# Patient Record
Sex: FEMALE | Race: WHITE
[De-identification: names, ages, dates, MRNs, and addresses within clinical notes are randomized per-mention and may not be internally consistent; named-entity substitution may affect disease eponyms.]

---

## 2019-06-14 ENCOUNTER — HOSPITAL ENCOUNTER (INPATIENT)
Dept: HOSPITAL 52 - LL.ED | Age: 74
LOS: 4 days | Discharge: NURSING, REHAB OR LONG-TERM CARE | DRG: 638 | End: 2019-06-18
Attending: FAMILY MEDICINE | Admitting: FAMILY MEDICINE
Payer: MEDICARE

## 2019-06-14 DIAGNOSIS — E87.5: ICD-10-CM

## 2019-06-14 DIAGNOSIS — G89.29: ICD-10-CM

## 2019-06-14 DIAGNOSIS — Z90.79: ICD-10-CM

## 2019-06-14 DIAGNOSIS — N18.3: ICD-10-CM

## 2019-06-14 DIAGNOSIS — K59.09: ICD-10-CM

## 2019-06-14 DIAGNOSIS — Z87.891: ICD-10-CM

## 2019-06-14 DIAGNOSIS — Z91.048: ICD-10-CM

## 2019-06-14 DIAGNOSIS — E11.65: Primary | ICD-10-CM

## 2019-06-14 DIAGNOSIS — H40.9: ICD-10-CM

## 2019-06-14 DIAGNOSIS — M54.9: ICD-10-CM

## 2019-06-14 DIAGNOSIS — Z79.82: ICD-10-CM

## 2019-06-14 DIAGNOSIS — I25.10: ICD-10-CM

## 2019-06-14 DIAGNOSIS — E66.9: ICD-10-CM

## 2019-06-14 DIAGNOSIS — E78.2: ICD-10-CM

## 2019-06-14 DIAGNOSIS — Z91.19: ICD-10-CM

## 2019-06-14 DIAGNOSIS — F41.8: ICD-10-CM

## 2019-06-14 DIAGNOSIS — K59.00: ICD-10-CM

## 2019-06-14 DIAGNOSIS — E87.1: ICD-10-CM

## 2019-06-14 DIAGNOSIS — D50.9: ICD-10-CM

## 2019-06-14 DIAGNOSIS — K21.9: ICD-10-CM

## 2019-06-14 DIAGNOSIS — J44.9: ICD-10-CM

## 2019-06-14 DIAGNOSIS — I50.9: ICD-10-CM

## 2019-06-14 DIAGNOSIS — E11.42: ICD-10-CM

## 2019-06-14 DIAGNOSIS — I13.0: ICD-10-CM

## 2019-06-14 DIAGNOSIS — Z88.8: ICD-10-CM

## 2019-06-14 DIAGNOSIS — Z79.899: ICD-10-CM

## 2019-06-14 DIAGNOSIS — E11.40: ICD-10-CM

## 2019-06-14 DIAGNOSIS — G31.01: ICD-10-CM

## 2019-06-14 DIAGNOSIS — E78.00: ICD-10-CM

## 2019-06-14 DIAGNOSIS — F02.80: ICD-10-CM

## 2019-06-14 DIAGNOSIS — E78.5: ICD-10-CM

## 2019-06-14 DIAGNOSIS — Z98.51: ICD-10-CM

## 2019-06-14 DIAGNOSIS — R42: ICD-10-CM

## 2019-06-14 DIAGNOSIS — E83.42: ICD-10-CM

## 2019-06-14 DIAGNOSIS — G47.30: ICD-10-CM

## 2019-06-14 LAB
CHLORIDE SERPL-SCNC: 94 MMOL/L (ref 98–107)
SODIUM SERPL-SCNC: 128 MMOL/L (ref 136–145)

## 2019-06-14 NOTE — EDM.PDOC
ED HPI GENERAL MEDICAL PROBLEM





- General


Chief Complaint: General


Stated Complaint: high blood sugar


Time Seen by Provider: 06/14/19 17:02


Source of Information: Reports: Patient, Family


History Limitations: Reports: Other (Patient is very ignorant in regards to her 

chronic medical problems and medications, minimizes family's concern regarding 

her wellbeing. )





- History of Present Illness


INITIAL COMMENTS - FREE TEXT/NARRATIVE: 





Patient comes to ER with complaint of feeling more lightheaded/unsteady. 

Sensation is worse when she moves her head. 


This overall is her main complaint. She denies any other acute changes. 





She was referred to the ER because of labs values that concerned the [facility] in [place] that were obtained early this past week.  Patient had glucose 

in 400s and potassium over 5.  [facility] could not contact the patient 

regarding these for several days. Ultimately they were able to call patient's 

relatives who brought the patient in to be seen.  She was then referred to the 

ER to be formally evaluated. 





Practitioner at [facility] and family feel that patient is noncompliant with her 

medications.  They also raise concerns that patient would do better if in a 

more supervised living situation where her meds are given to her on a scheduled 

basis.  Until recently, patient lived in [place]. She moved her at the encouragement 

of the family where in turn were concerned about the patient's safety and well-

being. 





Patient is very poor at recalling medications and diagnoses she has had in the 

past. She is appropriately oriented at this time.  





Review of note from [facility] shows that today BS was 511.  Admitted to provider 

that she didn't take insulin as directed, is not checking BS, and did not know 

if she had eaten at all today.  Long history of memory impairment and concerns 

of patient's inability to care for self documented per provider's chart review, 

including not taking meds as directed. 





- Related Data


 Allergies











Allergy/AdvReac Type Severity Reaction Status Date / Time


 


blue dye Allergy  Other Verified 06/14/19 17:15


 


fenofibrate [From Tricor] Allergy  Other Verified 06/14/19 17:15


 


simvastatin Allergy  Other Verified 06/14/19 17:15











Home Meds: 


 Home Meds





Acetaminophen [Acetaminophen Extra Strength] 1,000 mg PO Q4HR 06/14/19 [History]


Acetaminophen with Codeine [Acetaminophen-Cod #3] 1 tab PO BID PRN 06/14/19 [

History]


Aspirin [Ecotrin EC] 81 mg PO DAILY 06/14/19 [History]


Calcium Carbonate/Vitamin D3 [Calcium 600-Vit D3 800 Tab] 1 tab PO DAILY 06/14/ 19 [History]


Carvedilol 25 mg PO BIDAC 06/14/19 [History]


Citalopram [Citalopram HBr] 20 mg PO DAILY 06/14/19 [History]


Enalapril [Vasotec] 20 mg PO BID 06/14/19 [History]


Ferrous Gluconate 324 mg PO DAILY 06/14/19 [History]


Lancets 1 each MC ASDIRECTED 06/14/19 [History]


Meclizine [Antivert] 25 mg PO Q4HR PRN 06/14/19 [History]


Omega-3 Fatty Acids/DHA/EPA [Ovega-3 Softgel] 1 each PO DAILY 06/14/19 [History]


QUEtiapine [SEROquel] 12.5 mg PO DAILY 06/14/19 [History]


Ranitidine [Zantac] 150 mg PO BID PRN 06/14/19 [History]


Sennosides/Docusate Sodium [Senna Plus Tablet] 1 tab PO BID 06/14/19 [History]


Sodium Chloride 1 gm PO DAILY 06/14/19 [History]


Spironolactone [Aldactone] 12.5 mg PO DAILY 06/14/19 [History]


amLODIPine [Norvasc] 10 mg PO DAILY 06/14/19 [History]


cloNIDine HCl [Clonidine HCl ER] 0.1 mg PO BID 06/14/19 [History]


metOLazone [Metolazone] 2.5 mg PO MOTH@08 06/14/19 [History]











Past Medical History


HEENT History: Reports: Glaucoma, Sinusitis, Other (See Below)


Other HEENT History: Dyphasia, dizziness


Cardiovascular History: Reports: CAD, Heart Failure, High Cholesterol, 

Hypertension


Respiratory History: Reports: COPD, Sleep Apnea, Other (See Below)


Other Respiratory History: Lung nodule.


Gastrointestinal History: Reports: Chronic Constipation, GERD


Genitourinary History: Reports: Acute Renal Failure, Chronic Renal Insuffiency (

stage 3), Other (See Below)


Other Genitourinary History: Chronic kidney disease, abdominal wall hernia


OB/GYN History: Reports: Other (See Below)


Other OB/GYN History: Ovarian Cyst


Musculoskeletal History: Reports: Back Pain, Chronic, Other (See Below)


Other Musculoskeletal History: History of vertebral fracture, abdominal wall 

hernia


Neurological History: Reports: Neuropathy, Diabetic, Other (See Below)


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


Psychiatric History: Reports: Addiction, Anxiety, Dementia (waxing/waning 

memory impairment), Depression, Other (See Below)


Other Psychiatric History: Somnolence.


Endocrine/Metabolic History: Reports: Diabetes, Type II, IDDM, Obesity/BMI 30+, 

Other (See Below) (goiter)


Other Endocrine/Metabolic History: Pancreatitis. Goiter


Hematologic History: Reports: Anemia, Iron Deficiency, Other (See Below)


Other Hematologic History: Hyperkalemia, Hyponatremia, iron deficiency, lactic 

acidemia,


Immunologic History: Reports: None





- Past Surgical History


HEENT Surgical History: Reports: Adenoidectomy, Cataract Surgery, Tonsillectomy


Other HEENT Surgeries/Procedures: left ear surgery


GI Surgical History: Reports: Cholecystectomy


Female  Surgical History: Reports: Tubal Ligation





Social & Family History





- Tobacco Use


Smoking Status *Q: Former Smoker


Years of Tobacco use: 1


Used Tobacco, but Quit: Yes


Month/Year Tobacco Last Used: 06/1999





- Caffeine Use


Caffeine Use: Reports: Coffee, Soda


Caffeine Use Comment: Coffee every day- one pot of coffee. Pop occasionally.





- Alcohol Use


Alcohol Use History: No





- Recreational Drug Use


Recreational Drug Use: No





ED ROS GENERAL





- Review of Systems


Review Of Systems: See Below


Constitutional: Denies: Fever, Chills, Malaise, Weakness, Fatigue, Night Sweats

, Diaphoresis, Decreased Appetite, Weight Loss, Weight Gain


HEENT: Reports: Glasses, Vertigo.  Denies: Ear Pain, Eye Pain, Rhinitis, Sinus 

Problem, Throat Pain, Vision Change


Respiratory: Reports: No Symptoms


Cardiovascular: Reports: Lightheadedness.  Denies: Chest Pain, Blood Pressure 

Problem, Claudication, Dyspnea on Exertion, Edema, Orthopnea, Palpitations, 

Syncope


Endocrine: Reports: High Glucose (has high glucose per clinic, patient denies 

this, says her sugars are usually in the 200s)


GI/Abdominal: Reports: Constipation (chronic).  Denies: Abdominal Pain, Diarrhea

, Difficulty Swallowing, Distension, Nausea, Vomiting


: Reports: No Symptoms


Musculoskeletal: Reports: Neck Pain (chronic), Back Pain (chronic)


Skin: Reports: No Symptoms


Neurological: Reports: Confusion (patient denies this, but has history of 

memory impairment), Dizziness, Numbness (has peripheral neuropathy, chronic), 

Difficulty Walking (feels "tippy").  Denies: Headache, Syncope, Trouble Speaking

, Weakness, Change in Speech


Psychiatric: Reports: No Symptoms





ED EXAM, GENERAL





- Physical Exam


Exam: See Below


Exam Limited By: No Limitations


General Appearance: Alert, No Apparent Distress, Obese


Eye Exam: Bilateral Eye: EOMI, PERRL, Other (no nystagmus noted)


Ears: Normal External Exam, Normal Canal, Normal TMs, Other (has hearing aid)


Nose: No: Nasal Deformity, Nasal Swelling, Nasal Drainage


Throat/Mouth: Normal Lips, Normal Voice, No Airway Compromise


Head: Atraumatic, Normocephalic


Neck: Supple, Other (mild tenderness with palpation posterior neck muscles on 

right)


Respiratory/Chest: No Respiratory Distress, Lungs Clear, Normal Breath Sounds, 

No Accessory Muscle Use, Chest Non-Tender


Cardiovascular: Regular Rate, Rhythm, No Murmur


GI/Abdominal: Normal Bowel Sounds, Soft, Non-Tender, Other (large rounded 

abdomen)


 (Female) Exam: Deferred


Rectal (Female) Exam: Deferred


Back Exam: No: Muscle Spasm, Paraspinal Tenderness, Vertebral Tenderness


Extremities: Non-Tender, Normal Capillary Refill


Neurological: Alert, Other (equal strength bilaterally)


Psychiatric: Normal Affect, Normal Mood


Skin Exam: Warm, Dry, Intact, Normal Color





EKG INTERPRETATION


EKG Date: 06/14/19


Time: 18:12


Rhythm: NSR


Rate (Beats/Min): 64


Axis: Normal


P-Wave: Present


QRS: Normal


ST-T: Normal


QT: Normal





Course





- Vital Signs


Last Recorded V/S: 


 Last Vital Signs











Temp  36.9 C   06/14/19 19:03


 


Pulse  65   06/14/19 19:03


 


Resp  16   06/14/19 19:03


 


BP  170/74 H  06/14/19 21:17


 


Pulse Ox  95   06/14/19 19:03














- Orders/Labs/Meds


Orders: 


 Active Orders 24 hr











 Category Date Time Status


 


 EKG Documentation Completion [RC] ASDIRECTED Care  06/14/19 17:29 Active


 


 Abdomen 1V Upright [CR] Stat Exams  06/14/19 18:19 Taken


 


 Sodium Chloride 0.9% [Saline Flush] Med  06/14/19 17:29 Active





 10 ml FLUSH ASDIRECTED PRN   


 


 Saline Lock Insert [OM.PC] Routine Oth  06/14/19 17:29 Ordered








 Medication Orders





Acetaminophen (Tylenol Extra Strength)  1,000 mg PO Q6H PRN


   PRN Reason: Pain/Fever


Amlodipine Besylate (Norvasc)  10 mg PO DAILY [facility]


Aspirin (Halfprin)  81 mg PO DAILY [facility]


Calcium Carbonate (Oystcal-D 625 Mg-125 Units)  1 tab PO DAILY [facility]


Carvedilol (Coreg)  25 mg PO BIDAC [facility]


Citalopram Hydrobromide (Celexa)  20 mg PO DAILY [facility]


Clonidine HCl (Catapres)  0.1 mg PO BID [facility]


   Last Admin: 06/14/19 21:17  Dose: 0.1 mg


Enalapril Maleate (Vasotec)  20 mg PO BID [facility]


   Last Admin: 06/14/19 21:17  Dose: 20 mg


Famotidine (Pepcid)  20 mg PO BID PRN


   PRN Reason: INDIGETION


Ferrous Sulfate (Ferrous Sulfate)  325 mg PO DAILY [facility]


Fish Oil (Fish Oil)  1 gm PO DAILY [facility]


Sodium Chloride (Sodium Chloride 3%)  500 mls @ 20 mls/hr IV ASDIRECTED [facility]


Sodium Chloride (Normal Saline)  1,000 mls @ 100 mls/hr IV ASDIRECTED [facility]


Insulin Human Regular (Humulin R)  0 unit SUBCUT BIDAC [facility]; Protocol


Magnesium Sulfate/Dextrose (Magnesium Sulfate In D5w 100 Premix)  1 gm IV 

ONETIME ONE


   Stop: 06/14/19 23:01


Meclizine HCl (Antivert)  25 mg PO Q4HR PRN


   PRN Reason: Dizziness


Metolazone (Zaroxolyn)  2.5 mg PO MOTH@08 [facility]


Quetiapine Fumarate (Seroquel)  12.5 mg PO DAILY [facility]


Senna/Docusate Sodium (Senna Plus)  1 tab PO BID [facility]


Sodium Chloride (Saline Flush)  10 ml FLUSH ASDIRECTED PRN


   PRN Reason: Keep Vein Open


Sodium Chloride (Sodium Chloride)  1 gm PO DAILY [facility]


Spironolactone (Aldactone)  12.5 mg PO DAILY [facility]








Labs: 


 Laboratory Tests











  06/14/19 06/14/19 06/14/19 Range/Units





  17:36 17:36 17:52 


 


WBC  12.4 H    (4.0-10.2)  K/uL


 


RBC  4.09    (3.77-5.09)  M/uL


 


Hgb  11.5 L    (11.7-15.5)  g/dL


 


Hct  33.7 L    (34.0-46.0)  %


 


MCV  82.4 L    (84.0-98.0)  fL


 


MCH  28.1 L    (28.2-33.3)  pg


 


MCHC  34.1    (31.7-36.0)  g/dL


 


RDW  13.3    (11.2-14.1)  %


 


Plt Count  208    (150-350)  K/uL


 


Neut % (Auto)  55.2    (45.0-80.0)  %


 


Lymph % (Auto)  36.9    (10.0-50.0)  %


 


Mono % (Auto)  5.1    (2.0-14.0)  %


 


Eos % (Auto)  2.6    (0.0-5.0)  %


 


Baso % (Auto)  0.2    (0.0-2.0)  %


 


Neut # (Auto)  6.83    (1.40-7.00)  K/uL


 


Lymph # (Auto)  4.57 H    (0.50-3.50)  K/uL


 


Mono # (Auto)  0.63    (0.00-1.00)  K/uL


 


Eos # (Auto)  0.32    (0.00-0.50)  K/uL


 


Baso # (Auto)  0.03    (0.00-0.20)  K/uL


 


Sodium   128 L   (136-145)  mmol/L


 


Potassium   4.6   (3.5-5.1)  mmol/L


 


Chloride   94 L   ()  mmol/L


 


Carbon Dioxide   22.5   (21.0-32.0)  mmol/L


 


BUN   22 H   (7-18)  mg/dL


 


Creatinine   0.89   (0.51-1.17)  mg/dL


 


Est Cr Clr Drug Dosing   45.18   mL/min


 


Estimated GFR (MDRD)   > 60   mL/min


 


Glucose   372 H   ()  mg/dL


 


Calcium   8.6   (8.5-10.1)  mg/dL


 


Magnesium   1.0 L   (1.8-2.4)  mg/dL


 


Total Bilirubin   0.3   (0.2-1.0)  mg/dL


 


AST   14 L   (15-37)  U/L


 


ALT   18   (12-78)  U/L


 


Alkaline Phosphatase   90   ()  IU/L


 


NT-Pro-B Natriuret Pep   520 H   (0-125)  pg/mL


 


Total Protein   7.0   (6.4-8.2)  g/dL


 


Albumin   3.4   (3.4-5.0)  g/dL


 


Specimen Type    Urinblad  


 


Urine Color    Light yellow  


 


Urine Appearance    Clear  


 


Urine pH    5.5  (5.0-9.0)  


 


Ur Specific Gravity    1.010  (1.005-1.030)  


 


Urine Protein    100 H  (NEGATIVE)  mg/dL


 


Urine Glucose (UA)    >=1000 H  (NEGATIVE)  mg/dL


 


Urine Ketones    Negative  (NEGATIVE)  mg/dL


 


Urine Occult Blood    Trace-lysed H  (NEGATIVE)  


 


Urine Nitrite    Negative  (NEGATIVE)  


 


Urine Bilirubin    Negative  (NEGATIVE)  


 


Urine Urobilinogen    0.2  (0.2-1.0)  E.U./dL


 


Ur Leukocyte Esterase    Negative  (NEGATIVE)  


 


Urine RBC    0-5  /HPF


 


Urine WBC    0-5  /HPF


 


Ur Epithelial Cells    Many H  /LPF


 


Urine Bacteria    Moderate H  (NONE TO FEW)  /HPF


 


Urinalysis Comment      











Meds: 


Medications











Generic Name Dose Route Start Last Admin





  Trade Name Freq  PRN Reason Stop Dose Admin


 


Acetaminophen  1,000 mg  06/14/19 21:02  





  Tylenol Extra Strength  PO   





  Q6H PRN   





  Pain/Fever   





     





     





     


 


Amlodipine Besylate  10 mg  06/15/19 08:00  





  Norvasc  PO   





  DAILY [facility]   





     





     





     





     


 


Aspirin  81 mg  06/15/19 08:00  





  Halfprin  PO   





  DAILY [facility]   





     





     





     





     


 


Calcium Carbonate  1 tab  06/15/19 08:00  





  Oystcal-D 625 Mg-125 Units  PO   





  DAILY [facility]   





     





     





     





     


 


Carvedilol  25 mg  06/15/19 07:30  





  Coreg  PO   





  BIDAC [facility]   





     





     





     





     


 


Citalopram Hydrobromide  20 mg  06/15/19 08:00  





  Celexa  PO   





  DAILY [facility]   





     





     





     





     


 


Clonidine HCl  0.1 mg  06/14/19 20:00  06/14/19 21:17





  Catapres  PO   0.1 mg





  BID [facility]   Administration





     





     





     





     


 


Enalapril Maleate  20 mg  06/14/19 20:00  06/14/19 21:17





  Vasotec  PO   20 mg





  BID [facility]   Administration





     





     





     





     


 


Famotidine  20 mg  06/15/19 08:00  





  Pepcid  PO   





  BID PRN   





  INDIGETION   





     





     





     


 


Ferrous Sulfate  325 mg  06/15/19 08:00  





  Ferrous Sulfate  PO   





  DAILY [facility]   





     





     





     





     


 


Fish Oil  1 gm  06/15/19 08:00  





  Fish Oil  PO   





  DAILY [facility]   





     





     





     





     


 


Sodium Chloride  500 mls @ 20 mls/hr  06/14/19 20:00  





  Sodium Chloride 3%  IV   





  ASDIRECTED [facility]   





     





     





     





     


 


Sodium Chloride  1,000 mls @ 100 mls/hr  06/14/19 21:00  





  Normal Saline  IV   





  ASDIRECTED [facility]   





     





     





     





     


 


Insulin Human Regular  0 unit  06/15/19 07:30  





  Humulin R  SUBCUT   





  BIDAC [facility]   





     





     





  Protocol   





     


 


Magnesium Sulfate/Dextrose  1 gm  06/14/19 23:00  





  Magnesium Sulfate In D5w 100 Premix  IV  06/14/19 23:01  





  ONETIME ONE   





     





     





     





     


 


Meclizine HCl  25 mg  06/14/19 19:49  





  Antivert  PO   





  Q4HR PRN   





  Dizziness   





     





     





     


 


Metolazone  2.5 mg  06/17/19 08:00  





  Zaroxolyn  PO   





  MOTH@08 [facility]   





     





     





     





     


 


Quetiapine Fumarate  12.5 mg  06/15/19 08:00  





  Seroquel  PO   





  DAILY [facility]   





     





     





     





     


 


Senna/Docusate Sodium  1 tab  06/15/19 08:00  





  Senna Plus  PO   





  BID [facility]   





     





     





     





     


 


Sodium Chloride  10 ml  06/14/19 17:29  





  Saline Flush  FLUSH   





  ASDIRECTED PRN   





  Keep Vein Open   





     





     





     


 


Sodium Chloride  1 gm  06/15/19 08:00  





  Sodium Chloride  PO   





  DAILY [facility]   





     





     





     





     


 


Spironolactone  12.5 mg  06/15/19 08:00  





  Aldactone  PO   





  DAILY [facility]   





     





     





     





     














Discontinued Medications














Generic Name Dose Route Start Last Admin





  Trade Name Freq  PRN Reason Stop Dose Admin


 


Acetaminophen  1,000 mg  06/14/19 20:00  06/14/19 21:23





  Tylenol Extra Strength  PO   Not Given





  Q4HR [facility]   





     





     





     





     


 


Acetaminophen  1,000 mg  06/14/19 21:00  06/14/19 21:23





  Tylenol Extra Strength  PO   Not Given





  Q6H [facility]   





     





     





     





     


 


Acetaminophen/Codeine Phosphate  1 tab  06/14/19 19:49  





  Tylenol With Codeine No.3 300mg/30mg  PO   





  BID PRN   





  Pain   





     





     





     


 


Insulin Human Regular  10 unit  06/15/19 19:53  





  Humulin R  SUBCUT  06/15/19 19:54  





  ONETIME ONE   





     





     





     





     


 


Insulin Human Regular  10 unit  06/14/19 20:27  





  Humulin R  SUBCUT  06/14/19 20:28  





  ONETIME ONE   





     





     





     





     


 


Magnesium Citrate  300 ml  06/14/19 21:08  





  Citrate Of Magnesia  PO  06/14/19 21:09  





  ONETIME ONE   





     





     





     





     


 


Magnesium Sulfate/Dextrose  1 gm  06/14/19 18:12  06/14/19 18:59





  Magnesium Sulfate In D5w 100 Premix  IV  06/14/19 18:13  1 gm





  ONETIME ONE   Administration





     





     





     





     


 


Magnesium Sulfate/Dextrose  1 gm  06/14/19 22:00  





  Magnesium Sulfate In D5w 100 Premix  IV  06/14/19 22:01  





  ONETIME ONE   





     





     





     





     














- Radiology Interpretation


Free Text/Narrative:: 





Abdominal film did not show an obstructive pattern





- Re-Assessments/Exams


Free Text/Narrative Re-Assessment/Exam: 





WBC elevated. No obvious focal infection identified at this time. Observe for 

change. 


Admit for treatment of hyperglycemia, low Mg, low Na, and dizziness. 








Departure





- Departure


Time of Disposition: 19:00


Disposition: Admitted As Inpatient 65


Clinical Impression: 


 Diabetes mellitus type 2, uncontrolled, Hypomagnesemia, Dementia, Noncompliance








- Discharge Information


*PRESCRIPTION DRUG MONITORING PROGRAM REVIEWED*: Not Applicable


*COPY OF PRESCRIPTION DRUG MONITORING REPORT IN PATIENT ABDIEL: Not Applicable





- Problem List & Annotations


(1) Diabetes mellitus type 2, uncontrolled


SNOMED Code(s): 458073000, 551808565


   Code(s): E11.65 - TYPE 2 DIABETES MELLITUS WITH HYPERGLYCEMIA   Status: 

Chronic   Priority: High   Current Visit: Yes   Annotation/Comment:: Chronic 

noncompliance with diet, accuchecks, and insulin with recent elevations in 400s/

low 500s.   Recent A1c over 12. 


Initiate regular accuchecks and sliding scale insulin.     


Qualifiers: 


   Glycemic state: with hyperglycemia   Qualified Code(s): E11.65 - Type 2 

diabetes mellitus with hyperglycemia   





(2) Hypomagnesemia


SNOMED Code(s): 900980486


   Code(s): E83.42 - HYPOMAGNESEMIA   Status: Acute   Priority: High   Current 

Visit: No   Annotation/Comment:: Significantly low Mg at 1.0  This may be 

contributing to patient's dizziness. 


IV Magnesium ordered with recheck of Mg in AM.  Change to oral therapy as level 

improves.    





(3) Noncompliance


SNOMED Code(s): 4054655


   Code(s): Z91.19 - PATIENT'S NONCOMPLIANCE W OTH MEDICAL TREATMENT AND 

REGIMEN   Status: Chronic   Priority: High   Current Visit: No   Annotation/

Comment:: Chronic issues with noncompliance noted over several years when 

paperwork sent from [facility] reviewed. Suspect this is due to patient's Pick 

Disease and is worsening with progressing memory impairment. Family would like 

patient placed in a facility where meds are monitored and given at set times.  

  





(4) Dizziness


SNOMED Code(s): 873991955, 891700596


   Code(s): R42 - DIZZINESS AND GIDDINESS   Status: Acute   Priority: High   

Current Visit: Yes   Annotation/Comment:: History of intermittent light 

headedness.  Has PRN Meclizine at home but has not been taking it.  Dizziness 

may be exacerbated by severely low Magnesium, and also possibly by patient's 

hyponatremia.    





(5) Hyponatremia


SNOMED Code(s): 76029555


   Code(s): E87.1 - HYPO-OSMOLALITY AND HYPONATREMIA   Status: Chronic   

Priority: Medium   Current Visit: Yes   Annotation/Comment:: Chronic issues 

with low sodium.  Is supposed to be taking daily oral supplement.    





(6) Pick's disease


SNOMED Code(s): 97649647


   Code(s): G31.01 - PICK'S DISEASE; F02.80 - DEMENTIA IN OTH DISEASES CLASSD 

ELSWHR W/O BEHAVRL DISTURB   Status: Chronic   Priority: High   Current Visit: 

Yes   Annotation/Comment:: Patient would likely benefit from updated Neurology 

evaluation. Given the severity of her noncompliance issues and lack of insight 

into the problem/consequences, it would be in her best interest to be placed in 

a living situation where she gets consistent daily assistance with medication 

administration. She has had home health doing weekly med set up in past but 

patient was still non-compliant when left to her own device.    


Qualifiers: 


   Dementia behavioral disturbance: without behavioral disturbance   Qualified 

Code(s): G31.01 - Pick's disease; F02.80 - Dementia in other diseases 

classified elsewhere without behavioral disturbance   





(7) Iron (Fe) deficiency anemia


SNOMED Code(s): 67714195


   Code(s): D50.9 - IRON DEFICIENCY ANEMIA, UNSPECIFIED   Status: Chronic   

Priority: Low   Current Visit: No   Annotation/Comment:: has been stable per 

patient   





(8) CHF (congestive heart failure)


SNOMED Code(s): 49291380


   Code(s): I50.9 - HEART FAILURE, UNSPECIFIED   Status: Chronic   Priority: 

Low   Current Visit: No   Annotation/Comment:: Uncertain as to when last 

evaluation/echo performed.    


Qualifiers: 


   Heart failure type: unspecified   Heart failure chronicity: chronic   

Qualified Code(s): I50.9 - Heart failure, unspecified   





(9) Chronic back pain


SNOMED Code(s): 928546810


   Code(s): M54.9 - DORSALGIA, UNSPECIFIED; G89.29 - OTHER CHRONIC PAIN   Status

: Chronic   Priority: Low   Current Visit: No   Annotation/Comment:: History of 

chronic upper and lower back pain   





(10) Chronic kidney disease


SNOMED Code(s): 111341987


   Code(s): N18.9 - CHRONIC KIDNEY DISEASE, UNSPECIFIED   Status: Chronic   

Priority: Low   Current Visit: No   


Qualifiers: 


   Chronic kidney disease stage: stage 3 (moderate)   Qualified Code(s): N18.3 

- Chronic kidney disease, stage 3 (moderate)   





(11) Hypertension


SNOMED Code(s): 04183294


   Code(s): I10 - ESSENTIAL (PRIMARY) HYPERTENSION   Status: Chronic   Priority

: Medium   Current Visit: Yes   Annotation/Comment:: Elevated readings in ER. 

Unknown if patient compliant with meds.  Observe for change during inpatient 

stay.    


Qualifiers: 


   Hypertension type: essential hypertension   Qualified Code(s): I10 - 

Essential (primary) hypertension   





(12) Hyperlipidemia


SNOMED Code(s): 33855812


   Code(s): E78.5 - HYPERLIPIDEMIA, UNSPECIFIED   Status: Chronic   Priority: 

Low   Current Visit: No   


Qualifiers: 


   Hyperlipidemia type: unspecified   Qualified Code(s): E78.5 - Hyperlipidemia

, unspecified   





(13) COPD (chronic obstructive pulmonary disease)


SNOMED Code(s): 75603443


   Code(s): J44.9 - CHRONIC OBSTRUCTIVE PULMONARY DISEASE, UNSPECIFIED   Status

: Chronic   Priority: Low   Current Visit: No   Annotation/Comment:: stable per 

patient, no neb tx or inhalers prescribed.    





(14) CAD (coronary artery disease)


SNOMED Code(s): 54497468


   Code(s): I25.10 - ATHSCL HEART DISEASE OF NATIVE CORONARY ARTERY W/O ANG 

PCTRS   Status: Chronic   Priority: Low   Current Visit: No   Annotation/Comment

:: Noted on past medical history problem list   


Qualifiers: 


   Coronary Disease-Associated Artery/Lesion type: unspecified vessel or lesion 

type 





(15) Depression with anxiety


SNOMED Code(s): 505778919


   Code(s): F41.8 - OTHER SPECIFIED ANXIETY DISORDERS   Status: Chronic   

Priority: Low   Current Visit: No   Annotation/Comment:: observe   





(16) Sleep apnea


SNOMED Code(s): 81011099


   Code(s): G47.30 - SLEEP APNEA, UNSPECIFIED   Status: Chronic   Priority: Low

   Current Visit: No   


Qualifiers: 


   Sleep apnea type: unspecified type   Qualified Code(s): G47.30 - Sleep apnea

, unspecified   





(17) Constipation


SNOMED Code(s): 79714983


   Code(s): K59.00 - CONSTIPATION, UNSPECIFIED   Status: Chronic   Priority: 

Low   Current Visit: Yes   Annotation/Comment:: patient denies having recent 

bowel movement   





(18) Neuropathy


SNOMED Code(s): 409655830


   Code(s): G62.9 - POLYNEUROPATHY, UNSPECIFIED   Status: Chronic   Priority: 

Low   Current Visit: No   Annotation/Comment:: stable per patient   





- Problem List Review


Problem List Initiated/Reviewed/Updated: Yes





- My Orders


Last 24 Hours: 


My Active Orders





06/14/19 17:29


EKG Documentation Completion [RC] ASDIRECTED 


Sodium Chloride 0.9% [Saline Flush]   10 ml FLUSH ASDIRECTED PRN 


Saline Lock Insert [OM.PC] Routine 





06/14/19 18:19


Abdomen 1V Upright [CR] Stat 














- Assessment/Plan


Admission H&P: Please use this note as an admission H&P


Last 24 Hours: 


My Active Orders





06/14/19 17:29


EKG Documentation Completion [RC] ASDIRECTED 


Sodium Chloride 0.9% [Saline Flush]   10 ml FLUSH ASDIRECTED PRN 


Saline Lock Insert [OM.PC] Routine 





06/14/19 18:19


Abdomen 1V Upright [CR] Stat 











Assessment:: 





as above


Plan: 





Observe patient for changes given that she will be receiving regularly 

scheduled medications as prescribed.  Treat hyperglycemia/stabilize blood sugars

, magnesium, and sodium levels.  Determine if lightheadedness/unsteadiness 

improves with these corrections.  Have PT/OT evaluate patient on Monday.  Have 

Case Management visit with the family and discuss concerns around patient's 

living situation, safety.  Patient may need to be declared incompetent in order 

to get her to live in a more supervised environment. She has no insight into 

the gravity of her situation and health risks associated with her 

noncompliance.  Recommend in depth neuro-cognitive evaluation.  Anticipate 4 

days of inpatient care with possible Swing Bed placement depending on patient's 

course.

## 2019-06-15 LAB
CHLORIDE SERPL-SCNC: 101 MMOL/L (ref 98–107)
HBA1C MFR BLD: 12.5 % (ref 4.3–5.7)
SODIUM SERPL-SCNC: 134 MMOL/L (ref 136–145)

## 2019-06-15 RX ADMIN — OMEGA-3 FATTY ACIDS CAP 1000 MG SCH GM: 1000 CAP at 07:23

## 2019-06-15 RX ADMIN — CALCIUM CARBONATE-CHOLECALCIFEROL TAB 250 MG-125 UNIT SCH TAB: 250-125 TAB at 07:24

## 2019-06-15 RX ADMIN — INSULIN HUMAN SCH UNITS: 100 INJECTION, SOLUTION PARENTERAL at 12:02

## 2019-06-15 RX ADMIN — INSULIN HUMAN SCH UNITS: 100 INJECTION, SOLUTION PARENTERAL at 17:13

## 2019-06-15 NOTE — PCM.PN
- General Info


Date of Service: 06/15/19


Admission Dx/Problem (Free Text): 





Hyperglycemia, low Magnesium, hyponatremia, recent hyperkalemia in patient with 

history of memory impairment. 


Subjective Update: 





Patient feels improved. Still feels wobbly when trying to stand/ambulate. 


Functional Status: Reports: Pain Controlled, Tolerating Diet.  Denies: New 

Symptoms





- Review of Systems


General: Reports: No Symptoms


HEENT: Reports: No Symptoms, Glasses


Pulmonary: Reports: No Symptoms


Cardiovascular: Reports: No Symptoms


Gastrointestinal: Reports: Constipation


Genitourinary: Reports: No Symptoms


Musculoskeletal: Reports: No Symptoms


Skin: Reports: No Symptoms


Neurological: Reports: Pre-Existing Deficit (memory impairment), Difficulty 

Walking (unsteady gait)


Psychiatric: Reports: No Symptoms





- Patient Data


Vitals - Most Recent: 


 Last Vital Signs











Temp  36.9 C   06/15/19 07:22


 


Pulse  64   06/15/19 07:24


 


Resp  18   06/15/19 07:22


 


BP  150/70 H  06/15/19 07:24


 


Pulse Ox  97   06/15/19 07:22











Weight - Most Recent: 82.962 kg


I&O - Last 24 Hours: 


 Intake & Output











 06/14/19 06/15/19 06/15/19





 22:59 06:59 14:59


 


Intake Total  920 360


 


Output Total  300 


 


Balance  620 360











Lab Results Last 24 Hours: 


 Laboratory Results - last 24 hr











  06/14/19 06/14/19 06/14/19 Range/Units





  17:36 17:36 17:52 


 


WBC  12.4 H    (4.0-10.2)  K/uL


 


RBC  4.09    (3.77-5.09)  M/uL


 


Hgb  11.5 L    (11.7-15.5)  g/dL


 


Hct  33.7 L    (34.0-46.0)  %


 


MCV  82.4 L    (84.0-98.0)  fL


 


MCH  28.1 L    (28.2-33.3)  pg


 


MCHC  34.1    (31.7-36.0)  g/dL


 


RDW  13.3    (11.2-14.1)  %


 


Plt Count  208    (150-350)  K/uL


 


Neut % (Auto)  55.2    (45.0-80.0)  %


 


Lymph % (Auto)  36.9    (10.0-50.0)  %


 


Mono % (Auto)  5.1    (2.0-14.0)  %


 


Eos % (Auto)  2.6    (0.0-5.0)  %


 


Baso % (Auto)  0.2    (0.0-2.0)  %


 


Neut # (Auto)  6.83    (1.40-7.00)  K/uL


 


Lymph # (Auto)  4.57 H    (0.50-3.50)  K/uL


 


Mono # (Auto)  0.63    (0.00-1.00)  K/uL


 


Eos # (Auto)  0.32    (0.00-0.50)  K/uL


 


Baso # (Auto)  0.03    (0.00-0.20)  K/uL


 


Sodium   128 L   (136-145)  mmol/L


 


Potassium   4.6   (3.5-5.1)  mmol/L


 


Chloride   94 L   ()  mmol/L


 


Carbon Dioxide   22.5   (21.0-32.0)  mmol/L


 


BUN   22 H   (7-18)  mg/dL


 


Creatinine   0.89   (0.51-1.17)  mg/dL


 


Est Cr Clr Drug Dosing   45.18   mL/min


 


Estimated GFR (MDRD)   > 60   mL/min


 


Glucose   372 H   ()  mg/dL


 


POC Glucose     ()  mg/dl


 


Hemoglobin A1c     (4.3-5.7)  %


 


Calcium   8.6   (8.5-10.1)  mg/dL


 


Magnesium   1.0 L   (1.8-2.4)  mg/dL


 


Total Bilirubin   0.3   (0.2-1.0)  mg/dL


 


AST   14 L   (15-37)  U/L


 


ALT   18   (12-78)  U/L


 


Alkaline Phosphatase   90   ()  IU/L


 


NT-Pro-B Natriuret Pep   520 H   (0-125)  pg/mL


 


Total Protein   7.0   (6.4-8.2)  g/dL


 


Albumin   3.4   (3.4-5.0)  g/dL


 


Specimen Type    Urinblad  


 


Urine Color    Light yellow  


 


Urine Appearance    Clear  


 


Urine pH    5.5  (5.0-9.0)  


 


Ur Specific Gravity    1.010  (1.005-1.030)  


 


Urine Protein    100 H  (NEGATIVE)  mg/dL


 


Urine Glucose (UA)    >=1000 H  (NEGATIVE)  mg/dL


 


Urine Ketones    Negative  (NEGATIVE)  mg/dL


 


Urine Occult Blood    Trace-lysed H  (NEGATIVE)  


 


Urine Nitrite    Negative  (NEGATIVE)  


 


Urine Bilirubin    Negative  (NEGATIVE)  


 


Urine Urobilinogen    0.2  (0.2-1.0)  E.U./dL


 


Ur Leukocyte Esterase    Negative  (NEGATIVE)  


 


Urine RBC    0-5  /HPF


 


Urine WBC    0-5  /HPF


 


Ur Epithelial Cells    Many H  /LPF


 


Urine Bacteria    Moderate H  (NONE TO FEW)  /HPF


 


Urinalysis Comment      














  06/14/19 06/15/19 06/15/19 Range/Units





  21:35 07:16 07:17 


 


WBC     (4.0-10.2)  K/uL


 


RBC     (3.77-5.09)  M/uL


 


Hgb     (11.7-15.5)  g/dL


 


Hct     (34.0-46.0)  %


 


MCV     (84.0-98.0)  fL


 


MCH     (28.2-33.3)  pg


 


MCHC     (31.7-36.0)  g/dL


 


RDW     (11.2-14.1)  %


 


Plt Count     (150-350)  K/uL


 


Neut % (Auto)     (45.0-80.0)  %


 


Lymph % (Auto)     (10.0-50.0)  %


 


Mono % (Auto)     (2.0-14.0)  %


 


Eos % (Auto)     (0.0-5.0)  %


 


Baso % (Auto)     (0.0-2.0)  %


 


Neut # (Auto)     (1.40-7.00)  K/uL


 


Lymph # (Auto)     (0.50-3.50)  K/uL


 


Mono # (Auto)     (0.00-1.00)  K/uL


 


Eos # (Auto)     (0.00-0.50)  K/uL


 


Baso # (Auto)     (0.00-0.20)  K/uL


 


Sodium    134 L  (136-145)  mmol/L


 


Potassium    4.7  (3.5-5.1)  mmol/L


 


Chloride    101  ()  mmol/L


 


Carbon Dioxide    25.4  (21.0-32.0)  mmol/L


 


BUN    24 H  (7-18)  mg/dL


 


Creatinine    0.84  (0.51-1.17)  mg/dL


 


Est Cr Clr Drug Dosing    47.87  mL/min


 


Estimated GFR (MDRD)    > 60  mL/min


 


Glucose    338 H  ()  mg/dL


 


POC Glucose  334 H*  331 H*   ()  mg/dl


 


Hemoglobin A1c     (4.3-5.7)  %


 


Calcium    7.8 L  (8.5-10.1)  mg/dL


 


Magnesium    2.0  (1.8-2.4)  mg/dL


 


Total Bilirubin     (0.2-1.0)  mg/dL


 


AST     (15-37)  U/L


 


ALT     (12-78)  U/L


 


Alkaline Phosphatase     ()  IU/L


 


NT-Pro-B Natriuret Pep     (0-125)  pg/mL


 


Total Protein     (6.4-8.2)  g/dL


 


Albumin     (3.4-5.0)  g/dL


 


Specimen Type     


 


Urine Color     


 


Urine Appearance     


 


Urine pH     (5.0-9.0)  


 


Ur Specific Gravity     (1.005-1.030)  


 


Urine Protein     (NEGATIVE)  mg/dL


 


Urine Glucose (UA)     (NEGATIVE)  mg/dL


 


Urine Ketones     (NEGATIVE)  mg/dL


 


Urine Occult Blood     (NEGATIVE)  


 


Urine Nitrite     (NEGATIVE)  


 


Urine Bilirubin     (NEGATIVE)  


 


Urine Urobilinogen     (0.2-1.0)  E.U./dL


 


Ur Leukocyte Esterase     (NEGATIVE)  


 


Urine RBC     /HPF


 


Urine WBC     /HPF


 


Ur Epithelial Cells     /LPF


 


Urine Bacteria     (NONE TO FEW)  /HPF


 


Urinalysis Comment     














  06/15/19 06/15/19 06/15/19 Range/Units





  07:17 07:17 11:10 


 


WBC  10.7 H    (4.0-10.2)  K/uL


 


RBC  3.78    (3.77-5.09)  M/uL


 


Hgb  10.8 L    (11.7-15.5)  g/dL


 


Hct  31.5 L    (34.0-46.0)  %


 


MCV  83.3 L    (84.0-98.0)  fL


 


MCH  28.6    (28.2-33.3)  pg


 


MCHC  34.3    (31.7-36.0)  g/dL


 


RDW  13.5    (11.2-14.1)  %


 


Plt Count  194    (150-350)  K/uL


 


Neut % (Auto)  56.5    (45.0-80.0)  %


 


Lymph % (Auto)  35.6    (10.0-50.0)  %


 


Mono % (Auto)  5.1    (2.0-14.0)  %


 


Eos % (Auto)  2.5    (0.0-5.0)  %


 


Baso % (Auto)  0.3    (0.0-2.0)  %


 


Neut # (Auto)  6.06    (1.40-7.00)  K/uL


 


Lymph # (Auto)  3.82 H    (0.50-3.50)  K/uL


 


Mono # (Auto)  0.55    (0.00-1.00)  K/uL


 


Eos # (Auto)  0.27    (0.00-0.50)  K/uL


 


Baso # (Auto)  0.03    (0.00-0.20)  K/uL


 


Sodium     (136-145)  mmol/L


 


Potassium     (3.5-5.1)  mmol/L


 


Chloride     ()  mmol/L


 


Carbon Dioxide     (21.0-32.0)  mmol/L


 


BUN     (7-18)  mg/dL


 


Creatinine     (0.51-1.17)  mg/dL


 


Est Cr Clr Drug Dosing     mL/min


 


Estimated GFR (MDRD)     mL/min


 


Glucose     ()  mg/dL


 


POC Glucose    337 H*  ()  mg/dl


 


Hemoglobin A1c   12.5 H   (4.3-5.7)  %


 


Calcium     (8.5-10.1)  mg/dL


 


Magnesium     (1.8-2.4)  mg/dL


 


Total Bilirubin     (0.2-1.0)  mg/dL


 


AST     (15-37)  U/L


 


ALT     (12-78)  U/L


 


Alkaline Phosphatase     ()  IU/L


 


NT-Pro-B Natriuret Pep     (0-125)  pg/mL


 


Total Protein     (6.4-8.2)  g/dL


 


Albumin     (3.4-5.0)  g/dL


 


Specimen Type     


 


Urine Color     


 


Urine Appearance     


 


Urine pH     (5.0-9.0)  


 


Ur Specific Gravity     (1.005-1.030)  


 


Urine Protein     (NEGATIVE)  mg/dL


 


Urine Glucose (UA)     (NEGATIVE)  mg/dL


 


Urine Ketones     (NEGATIVE)  mg/dL


 


Urine Occult Blood     (NEGATIVE)  


 


Urine Nitrite     (NEGATIVE)  


 


Urine Bilirubin     (NEGATIVE)  


 


Urine Urobilinogen     (0.2-1.0)  E.U./dL


 


Ur Leukocyte Esterase     (NEGATIVE)  


 


Urine RBC     /HPF


 


Urine WBC     /HPF


 


Ur Epithelial Cells     /LPF


 


Urine Bacteria     (NONE TO FEW)  /HPF


 


Urinalysis Comment     











Med Orders - Current: 


 Current Medications





Acetaminophen (Tylenol Extra Strength)  1,000 mg PO Q6H PRN


   PRN Reason: Pain/Fever


   Last Admin: 06/15/19 07:28 Dose:  1,000 mg


Amlodipine Besylate (Norvasc)  10 mg PO DAILY [facility]


   Last Admin: 06/15/19 07:23 Dose:  10 mg


Aspirin (Halfprin)  81 mg PO DAILY [facility]


   Last Admin: 06/15/19 07:24 Dose:  81 mg


Calcium Carbonate (Oystcal-D 625 Mg-125 Units)  1 tab PO DAILY [facility]


   Last Admin: 06/15/19 07:24 Dose:  1 tab


Carvedilol (Coreg)  25 mg PO BIDAC [facility]


   Last Admin: 06/15/19 07:24 Dose:  25 mg


Citalopram Hydrobromide (Celexa)  20 mg PO DAILY [facility]


   Last Admin: 06/15/19 07:25 Dose:  20 mg


Clonidine HCl (Catapres)  0.1 mg PO BID [facility]


   Last Admin: 06/15/19 07:24 Dose:  0.1 mg


Enalapril Maleate (Vasotec)  20 mg PO BID [facility]


   Last Admin: 06/15/19 07:24 Dose:  20 mg


Famotidine (Pepcid)  20 mg PO BID PRN


   PRN Reason: INDIGETION


Ferrous Sulfate (Ferrous Sulfate)  325 mg PO DAILY [facility]


   Last Admin: 06/15/19 07:25 Dose:  325 mg


Fish Oil (Fish Oil)  1 gm PO DAILY [facility]


   Last Admin: 06/15/19 07:23 Dose:  1 gm


Sodium Chloride (Sodium Chloride 3%)  500 mls @ 15 mls/hr IV ASDIRECTED [facility]


   Last Admin: 06/14/19 21:46 Dose:  20 mls/hr


Sodium Chloride (Normal Saline)  1,000 mls @ 100 mls/hr IV ASDIRECTED [facility]


   Last Admin: 06/15/19 07:41 Dose:  100 mls/hr


Insulin Glargine (Lantus)  30 unit SUBCUT BEDTIME [facility]


Insulin Human Regular (Humulin R)  0 unit SUBCUT TIDAC [facility]; Protocol


   Last Admin: 06/15/19 12:02 Dose:  12 units


Magnesium Oxide (Magnesium Oxide)  400 mg PO DAILY [facility]


   Last Admin: 06/15/19 12:09 Dose:  400 mg


Meclizine HCl (Antivert)  25 mg PO Q4HR PRN


   PRN Reason: Dizziness


Metolazone (Zaroxolyn)  2.5 mg PO MOTH@08 [facility]


Quetiapine Fumarate (Seroquel)  12.5 mg PO DAILY [facility]


   Last Admin: 06/15/19 07:23 Dose:  12.5 mg


Senna/Docusate Sodium (Senna Plus)  1 tab PO BID [facility]


   Last Admin: 06/15/19 07:25 Dose:  1 tab


Sodium Chloride (Saline Flush)  10 ml FLUSH ASDIRECTED PRN


   PRN Reason: Keep Vein Open


Sodium Chloride (Sodium Chloride)  1 gm PO DAILY [facility]


   Last Admin: 06/15/19 07:23 Dose:  1 gm


Spironolactone (Aldactone)  12.5 mg PO DAILY [facility]


   Last Admin: 06/15/19 07:25 Dose:  12.5 mg





Discontinued Medications





Acetaminophen (Tylenol Extra Strength)  1,000 mg PO Q4HR [facility]


   Last Admin: 06/14/19 21:23 Dose:  Not Given


Acetaminophen (Tylenol Extra Strength)  1,000 mg PO Q6H [facility]


   Last Admin: 06/14/19 21:23 Dose:  Not Given


Acetaminophen/Codeine Phosphate (Tylenol With Codeine No.3 300mg/30mg)  1 tab 

PO BID PRN


   PRN Reason: Pain


[facility]  [facility]


   Last Admin: 06/15/19 07:26 Dose:  12 units


Insulin Human Regular (Humulin R)  10 unit SUBCUT ONETIME ONE


   Stop: 06/15/19 19:54


Insulin Human Regular (Humulin R)  10 unit SUBCUT ONETIME ONE


   Stop: 06/14/19 20:28


   Last Admin: 06/14/19 21:37 Dose:  10 unit


Magnesium Citrate (Citrate Of Magnesia)  300 ml PO ONETIME ONE


   Stop: 06/14/19 21:09


   Last Admin: 06/14/19 21:39 Dose:  296 ml


Magnesium Sulfate/Dextrose (Magnesium Sulfate In D5w 100 Premix)  1 gm IV 

ONETIME ONE


   Stop: 06/14/19 18:13


   Last Admin: 06/14/19 18:59 Dose:  1 gm


Magnesium Sulfate/Dextrose (Magnesium Sulfate In D5w 100 Premix)  1 gm IV 

ONETIME ONE


   Stop: 06/14/19 22:01


Magnesium Sulfate/Dextrose (Magnesium Sulfate In D5w 100 Premix)  1 gm IV 

ONETIME ONE


   Stop: 06/14/19 23:01


   Last Admin: 06/14/19 23:16 Dose:  1 gm











- Exam


Quality Assessment: DVT Prophylaxis (TEDs/Ambulation)


General: Alert, Oriented, Cooperative, No Acute Distress


HEENT: Pupils Equal, Pupils Reactive, EOMI, Mucous Membr. Moist/Pink


Neck: Supple


Lungs: Clear to Auscultation, Normal Respiratory Effort


Cardiovascular: Regular Rate, Regular Rhythm


GI/Abdominal Exam: Normal Bowel Sounds, Soft, Non-Tender


 (Female) Exam: Deferred


Back Exam: No: CVA Tenderness (L), CVA Tenderness (R), Muscle Spasm


Extremities: Non-Tender, Normal Capillary Refill


Skin: Warm, Dry


Neurological: No New Focal Deficit


Psy/Mental Status: Alert, Normal Affect, Normal Mood





- Problem List & Annotations


(1) Diabetes mellitus type 2, uncontrolled


SNOMED Code(s): 117523100, 525580682


   Code(s): E11.65 - TYPE 2 DIABETES MELLITUS WITH HYPERGLYCEMIA   Status: 

Chronic   Priority: High   Current Visit: Yes   


Qualifiers: 


   Glycemic state: with hyperglycemia   Qualified Code(s): E11.65 - Type 2 

diabetes mellitus with hyperglycemia   


Annotation/Comment:: Chronic noncompliance with diet, accuchecks, and insulin 

with recent elevations in 400s/low 500s.   Recent A1c over 12. 


Initiate regular accuchecks and sliding scale insulin.     





(2) Hypomagnesemia


SNOMED Code(s): 438255964


   Code(s): E83.42 - HYPOMAGNESEMIA   Status: Acute   Priority: High   Current 

Visit: No   Annotation/Comment:: Significantly low Mg at 1.0 


Improved today after Magnesium supplementation. Oral therapy initiated. Recheck 

level in AM.   





(3) Noncompliance


SNOMED Code(s): 3925516


   Code(s): Z91.19 - PATIENT'S NONCOMPLIANCE W H MEDICAL TREATMENT AND 

REGIMEN   Status: Chronic   Priority: High   Current Visit: No   Annotation/

Comment:: Chronic issues with noncompliance noted over several years when 

paperwork sent from [place] reviewed. Suspect part of this could be due to 

patient's Pick Disease and is worsening with progressing memory impairment. 

Patient says that she felt as though she was on too much medication and that 

the meds were making her feel poorly and this is why she would skip taking it. 

Also mentions being unable to qualify for assistance with costs, citing 

specifically her insulin as being too expensive for her to afford.  Family 

would like patient placed in a facility where meds are monitored and given at 

set times.    





(4) Dizziness


SNOMED Code(s): 694343249, 597267580


   Code(s): R42 - DIZZINESS AND GIDDINESS   Status: Acute   Priority: High   

Current Visit: Yes   Annotation/Comment:: History of intermittent light 

headedness. Also falls.  Has PRN Meclizine at home but has not been taking it.  

Dizziness may be exacerbated by severely low Magnesium, and also possibly by 

patient's hyponatremia.    





(5) Hyponatremia


SNOMED Code(s): 19315503


   Code(s): E87.1 - HYPO-OSMOLALITY AND HYPONATREMIA   Status: Chronic   

Priority: Medium   Current Visit: Yes   Annotation/Comment:: Chronic issues 

with low sodium.  Is supposed to be taking daily oral supplement. Improving but 

still low on today's measurement.    





(6) Pick's disease


SNOMED Code(s): 38512086


   Code(s): G31.01 - PICK'S DISEASE; F02.80 - DEMENTIA IN OTH DISEASES CLASSD 

ELSWHR W/O BEHAVRL DISTURB   Status: Chronic   Priority: High   Current Visit: 

Yes   


Qualifiers: 


   Dementia behavioral disturbance: without behavioral disturbance   Qualified 

Code(s): G31.01 - Pick's disease; F02.80 - Dementia in other diseases 

classified elsewhere without behavioral disturbance   


Annotation/Comment:: Patient would likely benefit from updated Neurology 

evaluation. This may be contributing to patient's noncompliance, but patient 

also states that she is not trusting that she needs to be on all of these 

medications and has issues affording her meds. Given the severity of her 

noncompliance issues and lack of insight into the problem/consequences, it 

might be in her best interest to be placed in a living situation where she gets 

consistent daily assistance with medication administration. She has had home 

health doing weekly med set up in past but patient was still non-compliant when 

left to her own device.    





(7) Iron (Fe) deficiency anemia


SNOMED Code(s): 12473297


   Code(s): D50.9 - IRON DEFICIENCY ANEMIA, UNSPECIFIED   Status: Chronic   

Priority: Low   Current Visit: No   Annotation/Comment:: has been stable per 

patient   





(8) CHF (congestive heart failure)


SNOMED Code(s): 35874790


   Code(s): I50.9 - HEART FAILURE, UNSPECIFIED   Status: Chronic   Priority: 

Low   Current Visit: No   


Qualifiers: 


   Heart failure type: unspecified   Heart failure chronicity: chronic   

Qualified Code(s): I50.9 - Heart failure, unspecified   


Annotation/Comment:: Uncertain as to when last evaluation/echo performed.    





(9) Chronic back pain


SNOMED Code(s): 427271967


   Code(s): M54.9 - DORSALGIA, UNSPECIFIED; G89.29 - OTHER CHRONIC PAIN   Status

: Chronic   Priority: Low   Current Visit: No   Annotation/Comment:: History of 

chronic upper and lower back pain   





(10) Chronic kidney disease


SNOMED Code(s): 405911567


   Code(s): N18.9 - CHRONIC KIDNEY DISEASE, UNSPECIFIED   Status: Chronic   

Priority: Low   Current Visit: No   


Qualifiers: 


   Chronic kidney disease stage: stage 3 (moderate)   Qualified Code(s): N18.3 

- Chronic kidney disease, stage 3 (moderate)   





(11) Hypertension


SNOMED Code(s): 52252056


   Code(s): I10 - ESSENTIAL (PRIMARY) HYPERTENSION   Status: Chronic   Priority

: Medium   Current Visit: Yes   


Qualifiers: 


   Hypertension type: essential hypertension   Qualified Code(s): I10 - 

Essential (primary) hypertension   


Annotation/Comment:: Elevated readings in ER. Unknown if patient compliant with 

meds.  Observe for change during inpatient stay. Overall improving   





(12) Hyperlipidemia


SNOMED Code(s): 66020381


   Code(s): E78.5 - HYPERLIPIDEMIA, UNSPECIFIED   Status: Chronic   Priority: 

Low   Current Visit: No   


Qualifiers: 


   Hyperlipidemia type: unspecified   Qualified Code(s): E78.5 - Hyperlipidemia

, unspecified   





(13) COPD (chronic obstructive pulmonary disease)


SNOMED Code(s): 95641405


   Code(s): J44.9 - CHRONIC OBSTRUCTIVE PULMONARY DISEASE, UNSPECIFIED   Status

: Chronic   Priority: Low   Current Visit: No   Annotation/Comment:: stable per 

patient, no neb tx or inhalers prescribed.    





(14) CAD (coronary artery disease)


SNOMED Code(s): 59365706


   Code(s): I25.10 - ATHSCL HEART DISEASE OF NATIVE CORONARY ARTERY W/O ANG 

PCTRS   Status: Chronic   Priority: Low   Current Visit: No   


Qualifiers: 


   Coronary Disease-Associated Artery/Lesion type: unspecified vessel or lesion 

type 


Annotation/Comment:: Noted on past medical history problem list   





(15) Depression with anxiety


SNOMED Code(s): 849357874


   Code(s): F41.8 - OTHER SPECIFIED ANXIETY DISORDERS   Status: Chronic   

Priority: Low   Current Visit: No   Annotation/Comment:: observe   





(16) Sleep apnea


SNOMED Code(s): 17728288


   Code(s): G47.30 - SLEEP APNEA, UNSPECIFIED   Status: Chronic   Priority: Low

   Current Visit: No   


Qualifiers: 


   Sleep apnea type: unspecified type   Qualified Code(s): G47.30 - Sleep apnea

, unspecified   





(17) Constipation


SNOMED Code(s): 22819095


   Code(s): K59.00 - CONSTIPATION, UNSPECIFIED   Status: Chronic   Priority: 

Low   Current Visit: Yes   Annotation/Comment:: patient denies having recent 

bowel movement   





(18) Neuropathy


SNOMED Code(s): 455995621


   Code(s): G62.9 - POLYNEUROPATHY, UNSPECIFIED   Status: Chronic   Priority: 

Low   Current Visit: No   Annotation/Comment:: stable per patient   





- Problem List Review


Problem List Initiated/Reviewed/Updated: Yes





- My Orders


Last 24 Hours: 


My Active Orders





06/14/19 17:29


EKG Documentation Completion [RC] ASDIRECTED 


Sodium Chloride 0.9% [Saline Flush]   10 ml FLUSH ASDIRECTED PRN 


Saline Lock Insert [OM.PC] Routine 





06/14/19 18:19


Abdomen 1V Upright [CR] Stat 





06/14/19 19:46


Patient Status [ADT] Routine 


Blood Glucose Check, Bedside [RC] QIDACANDBED 


Height and Weight [RC] .PRN 


Oxygen Therapy [RC] .PRN 


Up ad Rossi [RC] ASDIRECTED 


VTE/DVT Education [RC] .PRN 


Vital Signs [RC] Q8HR 


Consult to Case Management/ [CONS] Routine 


OT Evaluation and Treatment [CONS] Routine 


PT Evaluation and Treatment [CONS] Routine 


Resuscitation Status Routine 





06/14/19 19:47


Intake and Output [RC] QSHIFT 


Pulse Oximetry [RC] .PRN 





06/14/19 19:48


Antiembolic Devices [RC] 08,20 


Antiembolic Hose [OM.PC] Per Unit Routine 





06/14/19 19:49


Meclizine [Antivert]   25 mg PO Q4HR PRN 





06/14/19 20:00


Enalapril [Vasotec]   20 mg PO BID 


Sodium Chloride 3% 500 ml IV ASDIRECTED 


cloNIDine [Catapres]   0.1 mg PO BID 





06/14/19 21:00


Sodium Chloride 0.9% [Normal Saline] 1,000 ml IV ASDIRECTED 





06/14/19 21:02


Acetaminophen [Tylenol Extra Strength]   1,000 mg PO Q6H PRN 





06/14/19 21:15


CHF Questionnaire [COMM] Routine 





06/15/19 07:30


Carvedilol [Coreg]   25 mg PO BIDAC 





06/15/19 08:00


Aspirin [Halfprin]   81 mg PO DAILY 


Calcium Carbonate/Vitamin D3 [OystCal-D 625 MG-125 Units]   1 tab PO DAILY 


Citalopram [Celexa]   20 mg PO DAILY 


Docusate Sodium/Sennosides [Senna Plus]   1 tab PO BID 


Famotidine [Pepcid]   20 mg PO BID PRN 


Ferrous Sulfate   325 mg PO DAILY 


Fish Oil/Omega-3 Fatty Acids [Fish Oil]   1 gm PO DAILY 


QUEtiapine [SEROquel]   12.5 mg PO DAILY 


Sodium Chloride   1 gm PO DAILY 


Spironolactone [Aldactone]   12.5 mg PO DAILY 


amLODIPine [Norvasc]   10 mg PO DAILY 





06/15/19 11:30


Insulin Regular, Human [HumuLIN R]   See Protocol  SUBCUT TIDAC 





06/15/19 12:00


Magnesium Oxide   400 mg PO DAILY 





06/15/19 20:00


Insulin Glarg,Human.Rec.Analog [LantUS]   30 unit SUBCUT BEDTIME 





06/15/19 Breakfast


American Diabetic [employer] Diet [DIET] 


Fluid Restriction [DIET] 





06/16/19 05:11


LIPID PANEL [CHEM] Routine 


MAGNESIUM [CHEM] AM 





06/16/19 05:15


BASIC METABOLIC PANEL,BMP [CHEM] AM 


CBC WITH AUTO DIFF [HEME] AM 





06/17/19 08:00


metOLazone [Zaroxolyn]   2.5 mg PO MOTH@08 














- Assessment


Assessment:: 





As above.





- Plan


Plan:: 





Continue to work on improving patient's blood sugar levels and hyponatremia. 

Magnesium has improved.  Pending evaluation by PT and OT given patient's 

unsteadiness on her feet and history of falls. This will be performed Monday 

and there are no PT/OT services over the weekend. Likewise, unable to have Case 

Management evaluate patient's living situation and current concerns until 

Monday.  Thus anticipate an additional 2-3 days inpatient treatment to address 

her multiple chronic medical conditions and have a chance for PT/OT and Case 

Management to make recommendations.

## 2019-06-16 LAB
CHLORIDE SERPL-SCNC: 107 MMOL/L (ref 98–107)
SODIUM SERPL-SCNC: 138 MMOL/L (ref 136–145)

## 2019-06-16 RX ADMIN — INSULIN GLARGINE SCH UNITS: 100 INJECTION, SOLUTION SUBCUTANEOUS at 17:16

## 2019-06-16 RX ADMIN — OMEGA-3 FATTY ACIDS CAP 1000 MG SCH GM: 1000 CAP at 07:47

## 2019-06-16 RX ADMIN — INSULIN HUMAN SCH UNITS: 100 INJECTION, SOLUTION PARENTERAL at 07:45

## 2019-06-16 RX ADMIN — INSULIN HUMAN SCH UNITS: 100 INJECTION, SOLUTION PARENTERAL at 11:55

## 2019-06-16 RX ADMIN — INSULIN HUMAN SCH UNITS: 100 INJECTION, SOLUTION PARENTERAL at 17:18

## 2019-06-16 RX ADMIN — CALCIUM CARBONATE-CHOLECALCIFEROL TAB 250 MG-125 UNIT SCH TAB: 250-125 TAB at 07:46

## 2019-06-16 NOTE — PCM.PN
- General Info


Date of Service: 06/16/19


Admission Dx/Problem (Free Text): 





1.  Hyperkalemia


2.  Hypomagnesemia


3.  Confusion


4.  IDDM


5. Hyponatremia


Subjective Update: 





Patient is a somewhat poor historian secondary to her baseline confusion


Functional Status: Reports: Pain Controlled, Tolerating Diet, Ambulating, 

Urinating.  Denies: New Symptoms, Incentive Spirometry


Pain Score: 0





- Review of Systems


General: Reports: No Symptoms.  Denies: Fever, Weakness, Fatigue, Malaise, 

Chills, Night Sweats, Appetite (Good)


HEENT: Reports: Glasses, Other (Nonspecific oral mucous).  Denies: Dysphasia, 

Ear Pain, Eye Pain, Headaches, Post Nasal Drip, Sinus Congestion, Sore Throat, 

Rhinitis, Visual Changes


Pulmonary: Reports: No Symptoms.  Denies: Shortness of Breath, Pleuritic Chest 

Pain, Cough, Sputum, Wheezing


Cardiovascular: Reports: No Symptoms.  Denies: Chest Pain, Palpitations, 

Dyspnea on Exertion, Orthopnea, PND, Edema, Lightheadedness


Gastrointestinal: Reports: No Symptoms.  Denies: Abdominal Pain, Constipation (

Excellent results with magnesium citrate earlier in hospitalization with loose 

bowel movement this morning), Decreased Appetite, Diarrhea, Difficulty 

Swallowing, Hematochezia, Melena, Nausea, Vomiting


Genitourinary: Reports: No Symptoms.  Denies: Dysuria, Frequency, Burning, 

Urgency, Hematuria, Retention, Flank Pain


Musculoskeletal: Reports: No Symptoms.  Denies: Neck Pain, Shoulder Pain, Arm 

Pain, Back Pain, Leg Pain


Skin: Reports: No Symptoms.  Denies: Diaphoresis, Bruising


Neurological: Reports: Confusion (Stable).  Denies: Headache, Numbness, Tingling

, Weakness


Psychiatric: Reports: Confusion (Stable).  Denies: Depression, Anxiety, 

Agitation, Cravings





- Patient Data


Vitals - Most Recent: 


 Last Vital Signs











Temp  36.4 C   06/16/19 07:15


 


Pulse  56 L  06/16/19 11:51


 


Resp  20   06/16/19 07:15


 


BP  144/57 H  06/16/19 11:51


 


Pulse Ox  95   06/16/19 07:15








 Vital Signs - 24 hr











  06/15/19 06/15/19 06/15/19





  15:40 17:14 17:15


 


Temperature [ 36.7 C  





Oral]   


 


Temperature [   





Temporal]   


 


Pulse,   64





Peripheral   


 


Pulse, 64  





Peripheral [   





Left Pulse   





Oximetry]   


 


Respiratory 17  





Rate   


 


Blood Pressure  174/60 H 170/60 H


 


Blood Pressure   





[Left Upper Arm   





]   


 


Blood Pressure 174/60 H  





[Right Upper   





Arm]   


 


O2 Sat by Pulse 93 L  





Oximetry   














  06/15/19 06/15/19 06/16/19





  19:30 23:49 07:15


 


Temperature [   





Oral]   


 


Temperature [ 36.9 C 36.8 C 36.4 C





Temporal]   


 


Pulse,   





Peripheral   


 


Pulse, 59 L 53 L 56 L





Peripheral [   





Left Pulse   





Oximetry]   


 


Respiratory 16 18 20





Rate   


 


Blood Pressure   


 


Blood Pressure   144/57 H





[Left Upper Arm   





]   


 


Blood Pressure 150/54 H 166/62 H 





[Right Upper   





Arm]   


 


O2 Sat by Pulse 95 93 L 95





Oximetry   














  06/16/19 06/16/19 06/16/19





  07:45 07:46 07:47


 


Temperature [   





Oral]   


 


Temperature [   





Temporal]   


 


Pulse,   





Peripheral   


 


Pulse,   





Peripheral [   





Left Pulse   





Oximetry]   


 


Respiratory   





Rate   


 


Blood Pressure 144/57 H 144/57 H 144/57 H


 


Blood Pressure   





[Left Upper Arm   





]   


 


Blood Pressure   





[Right Upper   





Arm]   


 


O2 Sat by Pulse   





Oximetry   














  06/16/19





  11:51


 


Temperature [ 





Oral] 


 


Temperature [ 





Temporal] 


 


Pulse, 56 L





Peripheral 


 


Pulse, 





Peripheral [ 





Left Pulse 





Oximetry] 


 


Respiratory 





Rate 


 


Blood Pressure 144/57 H


 


Blood Pressure 





[Left Upper Arm 





] 


 


Blood Pressure 





[Right Upper 





Arm] 


 


O2 Sat by Pulse 





Oximetry 











Weight - Most Recent: 82.962 kg


I&O - Last 24 Hours: 


 Intake & Output











 06/15/19 06/16/19 06/16/19





 22:59 06:59 14:59


 


Intake Total 2492 1209 360


 


Output Total  1400 200


 


Balance 2492 -191 160











Imaging Impressions - Last 24 Hours: 





None


Lab Results Last 24 Hours: 


 Laboratory Results - last 24 hr











  06/15/19 06/15/19 06/16/19 Range/Units





  17:06 20:51 07:06 


 


WBC     (4.0-10.2)  K/uL


 


RBC     (3.77-5.09)  M/uL


 


Hgb     (11.7-15.5)  g/dL


 


Hct     (34.0-46.0)  %


 


MCV     (84.0-98.0)  fL


 


MCH     (28.2-33.3)  pg


 


MCHC     (31.7-36.0)  g/dL


 


RDW     (11.2-14.1)  %


 


Plt Count     (150-350)  K/uL


 


Neut % (Auto)     (45.0-80.0)  %


 


Lymph % (Auto)     (10.0-50.0)  %


 


Mono % (Auto)     (2.0-14.0)  %


 


Eos % (Auto)     (0.0-5.0)  %


 


Baso % (Auto)     (0.0-2.0)  %


 


Neut # (Auto)     (1.40-7.00)  K/uL


 


Lymph # (Auto)     (0.50-3.50)  K/uL


 


Mono # (Auto)     (0.00-1.00)  K/uL


 


Eos # (Auto)     (0.00-0.50)  K/uL


 


Baso # (Auto)     (0.00-0.20)  K/uL


 


Sodium    138  (136-145)  mmol/L


 


Potassium    5.1  (3.5-5.1)  mmol/L


 


Chloride    107  ()  mmol/L


 


Carbon Dioxide    24.2  (21.0-32.0)  mmol/L


 


BUN    18  (7-18)  mg/dL


 


Creatinine    0.75  (0.51-1.17)  mg/dL


 


Est Cr Clr Drug Dosing    53.61  mL/min


 


Estimated GFR (MDRD)    > 60  mL/min


 


Glucose    235 H  ()  mg/dL


 


POC Glucose  233 H  273 H*   ()  mg/dl


 


Calcium    8.0 L  (8.5-10.1)  mg/dL


 


Magnesium    1.9  (1.8-2.4)  mg/dL


 


Triglycerides    1032 H  ()  mg/dL


 


Cholesterol    348 H  (100-200)  mg/dL


 


HDL Cholesterol    41  (40-60)  mg/dL














  06/16/19 06/16/19 06/16/19 Range/Units





  07:06 07:17 11:24 


 


WBC  9.9    (4.0-10.2)  K/uL


 


RBC  3.89    (3.77-5.09)  M/uL


 


Hgb  10.8 L    (11.7-15.5)  g/dL


 


Hct  33.1 L    (34.0-46.0)  %


 


MCV  85.1    (84.0-98.0)  fL


 


MCH  27.8 L    (28.2-33.3)  pg


 


MCHC  32.6    (31.7-36.0)  g/dL


 


RDW  13.6    (11.2-14.1)  %


 


Plt Count  174    (150-350)  K/uL


 


Neut % (Auto)  54.0    (45.0-80.0)  %


 


Lymph % (Auto)  37.6    (10.0-50.0)  %


 


Mono % (Auto)  4.6    (2.0-14.0)  %


 


Eos % (Auto)  3.3    (0.0-5.0)  %


 


Baso % (Auto)  0.5    (0.0-2.0)  %


 


Neut # (Auto)  5.34    (1.40-7.00)  K/uL


 


Lymph # (Auto)  3.71 H    (0.50-3.50)  K/uL


 


Mono # (Auto)  0.45    (0.00-1.00)  K/uL


 


Eos # (Auto)  0.33    (0.00-0.50)  K/uL


 


Baso # (Auto)  0.05    (0.00-0.20)  K/uL


 


Sodium     (136-145)  mmol/L


 


Potassium     (3.5-5.1)  mmol/L


 


Chloride     ()  mmol/L


 


Carbon Dioxide     (21.0-32.0)  mmol/L


 


BUN     (7-18)  mg/dL


 


Creatinine     (0.51-1.17)  mg/dL


 


Est Cr Clr Drug Dosing     mL/min


 


Estimated GFR (MDRD)     mL/min


 


Glucose     ()  mg/dL


 


POC Glucose   221 H  252 H*  ()  mg/dl


 


Calcium     (8.5-10.1)  mg/dL


 


Magnesium     (1.8-2.4)  mg/dL


 


Triglycerides     ()  mg/dL


 


Cholesterol     (100-200)  mg/dL


 


HDL Cholesterol     (40-60)  mg/dL











Lee Results Last 24 Hours: 





None


Med Orders - Current: 


 Current Medications





Acetaminophen (Tylenol Extra Strength)  1,000 mg PO Q6H PRN


   PRN Reason: Pain/Fever


   Last Admin: 06/16/19 02:14 Dose:  1,000 mg


Amlodipine Besylate (Norvasc)  10 mg PO DAILY [facility]


   Last Admin: 06/16/19 07:47 Dose:  10 mg


Aspirin (Halfprin)  81 mg PO DAILY [facility]


   Last Admin: 06/16/19 07:47 Dose:  81 mg


Calcium Carbonate (Oystcal-D 625 Mg-125 Units)  1 tab PO DAILY [facility]


   Last Admin: 06/16/19 07:46 Dose:  1 tab


Carvedilol (Coreg)  25 mg PO BIDAC [facility]


   Last Admin: 06/16/19 11:51 Dose:  Not Given


Citalopram Hydrobromide (Celexa)  20 mg PO DAILY [facility]


   Last Admin: 06/16/19 07:47 Dose:  20 mg


Clonidine HCl (Catapres)  0.1 mg PO QPM [facility]


Enalapril Maleate (Vasotec)  20 mg PO BID [facility]


   Last Admin: 06/16/19 07:45 Dose:  20 mg


Enoxaparin Sodium (Lovenox)  40 mg SUBCUT Q24H [facility]


   Last Admin: 06/15/19 15:34 Dose:  40 mg


Famotidine (Pepcid)  20 mg PO BID PRN


   PRN Reason: INDIGETION


Ferrous Sulfate (Ferrous Sulfate)  325 mg PO DAILY [facility]


   Last Admin: 06/16/19 07:46 Dose:  325 mg


Fish Oil (Fish Oil)  1 gm PO DAILY [facility]


   Last Admin: 06/16/19 07:47 Dose:  1 gm


Sodium Chloride (Normal Saline)  1,000 mls @ 100 mls/hr IV ASDIRECTED [facility]


   Last Admin: 06/16/19 02:17 Dose:  100 mls/hr


Insulin Glargine (Lantus)  30 unit SUBCUT BEDTIME [facility]


   Last Admin: 06/15/19 20:52 Dose:  30 units


Insulin Human Regular (Humulin R)  0 unit SUBCUT TIDAC [facility]; Protocol


   Last Admin: 06/16/19 11:55 Dose:  9 units


Magnesium Oxide (Magnesium Oxide)  400 mg PO DAILY [facility]


   Last Admin: 06/16/19 07:46 Dose:  400 mg


Meclizine HCl (Antivert)  25 mg PO Q4HR PRN


   PRN Reason: Dizziness


Metolazone (Zaroxolyn)  2.5 mg PO MOTH@08 [facility]


Quetiapine Fumarate (Seroquel)  12.5 mg PO BEDTIME [facility]


Senna/Docusate Sodium (Senna Plus)  1 tab PO BID [facility]


   Last Admin: 06/16/19 07:47 Dose:  1 tab


Sodium Chloride (Saline Flush)  10 ml FLUSH ASDIRECTED PRN


   PRN Reason: Keep Vein Open


Sodium Chloride (Sodium Chloride)  1 gm PO DAILY [facility]


   Last Admin: 06/16/19 07:46 Dose:  1 gm


Spironolactone (Aldactone)  12.5 mg PO DAILY [facility]


   Last Admin: 06/16/19 07:47 Dose:  12.5 mg


Temazepam (Restoril)  15 mg PO BEDTIME PRN


   PRN Reason: Insomnia


   Last Admin: 06/16/19 02:15 Dose:  15 mg





Discontinued Medications





Acetaminophen (Tylenol Extra Strength)  1,000 mg PO Q4HR [facility]


   Last Admin: 06/14/19 21:23 Dose:  Not Given


Acetaminophen (Tylenol Extra Strength)  1,000 mg PO Q6H [facility]


   Last Admin: 06/14/19 21:23 Dose:  Not Given


Acetaminophen/Codeine Phosphate (Tylenol With Codeine No.3 300mg/30mg)  1 tab 

PO BID PRN


   PRN Reason: Pain


Clonidine HCl (Catapres)  0.1 mg PO BID [facility]


   Last Admin: 06/16/19 07:46 Dose:  0.1 mg


Sodium Chloride (Sodium Chloride 3%)  500 mls @ 15 mls/hr IV ASDIRECTED [facility]


   Stop: 06/16/19 00:43


   Last Infusion: 06/15/19 17:56 Dose:  15 mls/hr


Insulin [facility]ulin R)  [facility]


   Last Admin: 06/15/19 07:26 Dose:  12 units


Insulin Human Regular (Humulin R)  10 unit SUBCUT ONETIME ONE


   Stop: 06/15/19 19:54


Insulin Human Regular (Humulin R)  10 unit SUBCUT ONETIME ONE


   Stop: 06/14/19 20:28


   Last Admin: 06/14/19 21:37 Dose:  10 unit


Magnesium Citrate (Citrate Of Magnesia)  300 ml PO ONETIME ONE


   Stop: 06/14/19 21:09


   Last Admin: 06/14/19 21:39 Dose:  296 ml


Magnesium Sulfate/Dextrose (Magnesium Sulfate In D5w 100 Premix)  1 gm IV 

ONETIME ONE


   Stop: 06/14/19 18:13


   Last Admin: 06/14/19 18:59 Dose:  1 gm


Magnesium Sulfate/Dextrose (Magnesium Sulfate In D5w 100 Premix)  1 gm IV 

ONETIME ONE


   Stop: 06/14/19 22:01


Magnesium Sulfate/Dextrose (Magnesium Sulfate In D5w 100 Premix)  1 gm IV 

ONETIME ONE


   Stop: 06/14/19 23:01


   Last Admin: 06/14/19 23:16 Dose:  1 gm


Quetiapine Fumarate (Seroquel)  12.5 mg PO DAILY [facility]


   Last Admin: 06/15/19 07:23 Dose:  12.5 mg











- Exam


Quality Assessment: DVT Prophylaxis (Lovenox).  No: Supplemental Oxygen, 

Central Line/PICC, Urine Catheter, Skin Breakdown, Restraints


General: Alert, Oriented (Stable baseline confusion), Cooperative, No Acute 

Distress


HEENT: Pupils Equal, Pupils Reactive, EOMI, Mucous Membr. Moist/Pink, Other (

Patient wearing glasses)


Neck: Supple, Trachea Midline, No JVD, No Thyromegaly, Carotid Bruit (Mild 

bilateral carotid bruits).  No: Lymphadenopathy


Lungs: Clear to Auscultation, Normal Respiratory Effort.  No: Rub


Cardiovascular: Regular Rate, Regular Rhythm, No Murmurs.  No: Murmurs, Gallops

, Rubs


GI/Abdominal Exam: Normal Bowel Sounds, Soft, Non-Tender, No Organomegaly, No 

Distention, No Abnormal Bruit, No Mass, Pelvis Stable, Other (Obese).  No: 

Guarding


 (Female) Exam: Deferred


Back Exam: Full Range of Motion, Other (Mild kyphosis).  No: CVA Tenderness (L)

, CVA Tenderness (R), Muscle Spasm, Paraspinal Tenderness, Vertebral Tenderness


Extremities: Normal Inspection, Normal Range of Motion, Non-Tender, No Pedal 

Edema, Normal Capillary Refill.  No: Kiana's Sign


Peripheral Pulses: 2+: Radial (L), Radial (R), Dorsalis Pedis (L), Dorsalis 

Pedis (R)


Skin: Warm, Dry, Intact, Ecchymosis (Mild ecchymosis at Lovenox sites)


Neurological: No New Focal Deficit, Other (Mild to moderate confusionstable by 

history)


Psy/Mental Status: Alert, Normal Affect, Normal Mood.  No: Agitated, 

Hallucinations, Withdrawal Symptoms





- Problem List & Annotations


(1) Hypertension


SNOMED Code(s): 04351071


   Code(s): I10 - ESSENTIAL (PRIMARY) HYPERTENSION   Status: Chronic   Priority

: Medium   Current Visit: Yes   


Qualifiers: 


   Hypertension type: essential hypertension   Qualified Code(s): I10 - 

Essential (primary) hypertension   


Annotation/Comment:: Continued variable blood pressures throughout this 

hospitalization. Some bradycardia this morning with Coreg held. We will change 

her clonidine to an every afternoon rather than twice a day regimen for now 

with continuation of previous Coreg dose. Further hospitalization required for 

medication adjustments.   





(2) Hyponatremia


SNOMED Code(s): 82939299


   Code(s): E87.1 - HYPO-OSMOLALITY AND HYPONATREMIA   Status: Chronic   

Priority: Medium   Current Visit: Yes   Annotation/Comment:: Discontinue IV 

fluids. Sodium improved at this time with CHF a probable component. X-rays and 

lab work in the a.m. Chronic issues with low sodium.  She was previously 

supposed to be taking daily oral supplement. Adjust her diuretic therapy 

secondary to her CHF.   





(3) Pick's disease


SNOMED Code(s): 33620969


   Code(s): G31.01 - PICK'S DISEASE; F02.80 - DEMENTIA IN OTH DISEASES CLASSD 

ELSWHR W/O BEHAVRL DISTURB   Status: Chronic   Priority: High   Current Visit: 

Yes   


Qualifiers: 


   Dementia behavioral disturbance: without behavioral disturbance   Qualified 

Code(s): G31.01 - Pick's disease; F02.80 - Dementia in other diseases 

classified elsewhere without behavioral disturbance   


Annotation/Comment:: Patient would likely benefit from updated Neurology 

evaluation. This may be contributing to patient's noncompliance, but patient 

also states that she is not trusting that she needs to be on all of these 

medications and has issues affording her meds. Given the severity of her 

noncompliance issues and lack of insight into the problem/consequences, it 

might be in her best interest to be placed in a living situation where she gets 

consistent daily assistance with medication administration. She has had home 

health doing weekly med set up in past but patient was still non-compliant when 

left to her own device. Per the family's request they are considering nursing 

home versus swing bed care placement with a short term. Patient does not wish 

to be admitted to the nursing home, however. Physical therapy and occupational 

therapy has been ordered in addition to  consultation in the 

a.m. for possible nursing home placement. Change from Pepcid to Prilosec 

secondary to her confusion.   





(4) Hypomagnesemia


SNOMED Code(s): 902609592


   Code(s): E83.42 - HYPOMAGNESEMIA   Status: Acute   Priority: Medium   

Current Visit: Yes   Annotation/Comment:: Normal today. Continue magnesium 

oxide supplementation.   





(5) CAD (coronary artery disease)


SNOMED Code(s): 62655499


   Code(s): I25.10 - ATHSCL HEART DISEASE OF NATIVE CORONARY ARTERY W/O ANG 

PCTRS   Status: Chronic   Priority: Medium   Current Visit: Yes   


Qualifiers: 


   Coronary Disease-Associated Artery/Lesion type: unspecified vessel or lesion 

type   Associated angina: without angina 


Annotation/Comment:: Noted on past medical history problem list. No chest pain 

or anginal type symptoms despite mildly elevated BNP on admission. Seroquel to 

be discontinued.   





(6) CHF (congestive heart failure)


SNOMED Code(s): 59330714


   Code(s): I50.9 - HEART FAILURE, UNSPECIFIED   Status: Chronic   Priority: 

Medium   Current Visit: Yes   


Qualifiers: 


   Heart failure type: unspecified   Heart failure chronicity: chronic   

Qualified Code(s): I50.9 - Heart failure, unspecified   


Annotation/Comment:: Uncertain as to when last evaluation/echo performed. Note 

current FULL CODE STATUS. Consider echocardiogram depending on her family's 

wishes. Adjust diuretic therapy with caution secondary to her recent 

electrolyte disturbances.   





(7) COPD (chronic obstructive pulmonary disease)


SNOMED Code(s): 95078022


   Code(s): J44.9 - CHRONIC OBSTRUCTIVE PULMONARY DISEASE, UNSPECIFIED   Status

: Chronic   Priority: Low   Current Visit: No   


Qualifiers: 


   COPD type: emphysema   Emphysema type: panlobular   Qualified Code(s): J43.1 

- Panlobular emphysema   


Annotation/Comment:: Stable by history with no medical therapy required. No 

recent fever, bronchitic type symptoms, etc.. Note history of sleep apnea with 

patient not currently using a CPAP. Add Mucinex for her nonspecific oral 

mucous. Leukocytosis on admission has resolved   





(8) Iron (Fe) deficiency anemia


SNOMED Code(s): 12123524


   Code(s): D50.9 - IRON DEFICIENCY ANEMIA, UNSPECIFIED   Status: Chronic   

Priority: Medium   Current Visit: Yes   


Qualifiers: 


   Iron deficiency anemia type: unspecified iron deficiency   Qualified Code(s)

: D50.9 - Iron deficiency anemia, unspecified   


Annotation/Comment:: Iron studies in the a.m. Hemoglobin stable.   





(9) IDDM (insulin dependent diabetes mellitus)


SNOMED Code(s): 43394063


   Code(s): E11.9 - TYPE 2 DIABETES MELLITUS WITHOUT COMPLICATIONS; Z79.4 - 

LONG TERM (CURRENT) USE OF INSULIN   Status: Chronic   Priority: High   Current 

Visit: Yes   Annotation/Comment:: Note patient's diabetes under extremely poor 

control with glycosylated hemoglobin of 12.5 on admission. She also has 

significant hypertriglyceridemia with finding scale in effect, however further 

adjustments required during this hospitalization. Home health has not been 

effective in the past secondary to patient's confusion and medication 

noncompliance. Note history of diabetic nephropathy and neuropathy.   





(10) Hyperlipidemia


SNOMED Code(s): 90900260


   Code(s): E78.5 - HYPERLIPIDEMIA, UNSPECIFIED   Status: Chronic   Priority: 

Medium   Current Visit: Yes   


Qualifiers: 


   Hyperlipidemia type: mixed hyperlipidemia   Qualified Code(s): E78.2 - Mixed 

hyperlipidemia   


Annotation/Comment:: Significant hypertriglyceridemia secondary to her 

uncontrolled IDDM. Continue further medication and dietary adjustments. Weight 

loss in moderation advisable.   





- Problem List Review


Problem List Initiated/Reviewed/Updated: Yes





- My Orders


Last 24 Hours: 


My Active Orders





06/16/19 18:00


cloNIDine [Catapres]   0.1 mg PO QPM 














- Assessment


Assessment:: 





As above








- Plan


Plan:: 


 As above. Extensive precautions were given to the patient, who is in agreement 

with the treatment plan. Anticipate 1-2 days of initial inpatient care.

## 2019-06-17 LAB
CHLORIDE SERPL-SCNC: 104 MMOL/L (ref 98–107)
SODIUM SERPL-SCNC: 136 MMOL/L (ref 136–145)

## 2019-06-17 RX ADMIN — CALCIUM CARBONATE-CHOLECALCIFEROL TAB 250 MG-125 UNIT SCH TAB: 250-125 TAB at 08:59

## 2019-06-17 RX ADMIN — INSULIN GLARGINE SCH UNIT: 100 INJECTION, SOLUTION SUBCUTANEOUS at 17:41

## 2019-06-17 RX ADMIN — INSULIN HUMAN SCH UNITS: 100 INJECTION, SOLUTION PARENTERAL at 11:59

## 2019-06-17 RX ADMIN — INSULIN GLARGINE SCH UNITS: 100 INJECTION, SOLUTION SUBCUTANEOUS at 09:06

## 2019-06-17 RX ADMIN — OMEPRAZOLE SCH MG: 20 CAPSULE, DELAYED RELEASE ORAL at 09:05

## 2019-06-17 RX ADMIN — INSULIN HUMAN SCH: 100 INJECTION, SOLUTION PARENTERAL at 17:40

## 2019-06-17 RX ADMIN — INSULIN HUMAN SCH UNITS: 100 INJECTION, SOLUTION PARENTERAL at 09:08

## 2019-06-17 NOTE — PCM.PN
- General Info


Date of Service: 06/17/19


Admission Dx/Problem (Free Text): 





1.  Hyperkalemia


2.  Hypomagnesemia


3.  Confusion


4.  IDDM


5. Hyponatremia


Subjective Update: 





Patient is a somewhat poor historian secondary to her baseline confusion


Functional Status: Reports: Pain Controlled, Tolerating Diet, Ambulating, 

Urinating.  Denies: New Symptoms, Incentive Spirometry


Pain Score: 0





- Review of Systems


General: Reports: No Symptoms.  Denies: Fever, Weakness, Fatigue, Malaise, 

Chills, Night Sweats, Appetite


HEENT: Reports: Glasses, Other (Persistent oral mucous nonproductive cough).  

Denies: Sinus Congestion


Pulmonary: Reports: No Symptoms.  Denies: Shortness of Breath, Pleuritic Chest 

Pain, Cough, Sputum, Hemoptysis, Wheezing


Cardiovascular: Reports: Edema (Improved dependent).  Denies: Chest Pain, 

Palpitations, Dyspnea on Exertion, Orthopnea, Lightheadedness


Gastrointestinal: Reports: No Symptoms, Abdominal Pain.  Denies: Constipation (

Excellent bowel movement yesterday), Decreased Appetite, Diarrhea, Difficulty 

Swallowing, Flatus, Hematochezia, Melena, Nausea, Vomiting


Genitourinary: Reports: No Symptoms.  Denies: Dysuria, Frequency, Burning, 

Urgency, Hematuria, Retention, Flank Pain


Musculoskeletal: Reports: Shoulder Pain (Chronic), Back Pain (Chronic)


Skin: Reports: No Symptoms, Bruising (Left hand secondary to IV, mild- Lovenox 

sites).  Denies: Diaphoresis


Neurological: Reports: Confusion (Stable).  Denies: Dizziness, Headache, Syncope

, Tingling, Trouble Speaking, Difficulty Walking, Weakness, Gait Disturbance


Psychiatric: Reports: Confusion.  Denies: Depression, Anxiety, Agitation, 

Hallucinations





- Patient Data


Vitals - Most Recent: 


 Last Vital Signs











Temp  36.3 C   06/17/19 08:00


 


Pulse  63   06/17/19 09:02


 


Resp  20   06/17/19 08:00


 


BP  193/59 H  06/17/19 09:05


 


Pulse Ox  94 L  06/17/19 08:00








 Vital Signs - 24 hr











  06/16/19 06/16/19 06/16/19





  16:00 17:14 17:15


 


Temperature [ 37.1 C  





Oral]   


 


Temperature [   





Temporal]   


 


Pulse,   67





Peripheral   


 


Pulse, 67  





Peripheral [   





Left Pulse   





Oximetry]   


 


Pulse,   





Peripheral [   





Right Pulse   





Oximetry]   


 


Respiratory 16  





Rate   


 


Blood Pressure  193/62 H 193/62 H


 


Blood Pressure   





[Left Upper Arm   





]   


 


Blood Pressure 193/62 H  





[Right Upper   





Arm]   


 


O2 Sat by Pulse 93 L  





Oximetry   














  06/17/19 06/17/19 06/17/19





  00:00 03:30 08:00


 


Temperature [   





Oral]   


 


Temperature [ 36.6 C  36.3 C





Temporal]   


 


Pulse,   





Peripheral   


 


Pulse,   63





Peripheral [   





Left Pulse   





Oximetry]   


 


Pulse, 62  





Peripheral [   





Right Pulse   





Oximetry]   


 


Respiratory 17  20





Rate   


 


Blood Pressure   


 


Blood Pressure 146/55 H  





[Left Upper Arm   





]   


 


Blood Pressure   





[Right Upper   





Arm]   


 


O2 Sat by Pulse 89 L 94 L 94 L





Oximetry   














  06/17/19 06/17/19 06/17/19





  09:02 09:03 09:05


 


Temperature [   





Oral]   


 


Temperature [   





Temporal]   


 


Pulse, 63  





Peripheral   


 


Pulse,   





Peripheral [   





Left Pulse   





Oximetry]   


 


Pulse,   





Peripheral [   





Right Pulse   





Oximetry]   


 


Respiratory   





Rate   


 


Blood Pressure 193/59 H 193/59 H 193/59 H


 


Blood Pressure   





[Left Upper Arm   





]   


 


Blood Pressure   





[Right Upper   





Arm]   


 


O2 Sat by Pulse   





Oximetry   











Weight - Most Recent: 82.962 kg


I&O - Last 24 Hours: 


 Intake & Output











 06/16/19 06/17/19 06/17/19





 22:59 06:59 14:59


 


Intake Total 1265 50 


 


Output Total 3829 791 4203


 


Balance -135 -650 -1300











Imaging Impressions - Last 24 Hours: 





Chest x-ray, PA and lateral, shows mild kyphosis with moderate osteophytic and 

osteoporotic changes. Mild Cardiomegaly and centralized CHF with mildly 

elevated right hemidiaphragm. Somewhat prominent proximal aortic arch. Probable 

COPD with no significant pulmonary infiltrates, pneumothorax, etc.


Lab Results Last 24 Hours: 


 Laboratory Results - last 24 hr











  06/16/19 06/17/19 06/17/19 Range/Units





  17:03 03:37 06:40 


 


WBC    9.7  (4.0-10.2)  K/uL


 


RBC    3.87  (3.77-5.09)  M/uL


 


Hgb    10.8 L  (11.7-15.5)  g/dL


 


Hct    32.9 L  (34.0-46.0)  %


 


MCV    85.0  (84.0-98.0)  fL


 


MCH    27.9 L  (28.2-33.3)  pg


 


MCHC    32.8  (31.7-36.0)  g/dL


 


RDW    13.4  (11.2-14.1)  %


 


Plt Count    169  (150-350)  K/uL


 


Neut % (Auto)    54.5  (45.0-80.0)  %


 


Lymph % (Auto)    36.6  (10.0-50.0)  %


 


Mono % (Auto)    5.3  (2.0-14.0)  %


 


Eos % (Auto)    3.2  (0.0-5.0)  %


 


Baso % (Auto)    0.4  (0.0-2.0)  %


 


Neut # (Auto)    5.26  (1.40-7.00)  K/uL


 


Lymph # (Auto)    3.54 H  (0.50-3.50)  K/uL


 


Mono # (Auto)    0.51  (0.00-1.00)  K/uL


 


Eos # (Auto)    0.31  (0.00-0.50)  K/uL


 


Baso # (Auto)    0.04  (0.00-0.20)  K/uL


 


Sodium     (136-145)  mmol/L


 


Potassium     (3.5-5.1)  mmol/L


 


Chloride     ()  mmol/L


 


Carbon Dioxide     (21.0-32.0)  mmol/L


 


BUN     (7-18)  mg/dL


 


Creatinine     (0.51-1.17)  mg/dL


 


Est Cr Clr Drug Dosing     mL/min


 


Estimated GFR (MDRD)     mL/min


 


Glucose     ()  mg/dL


 


POC Glucose  199 H  195 H   ()  mg/dl


 


Calcium     (8.5-10.1)  mg/dL


 


Magnesium     (1.8-2.4)  mg/dL


 


Iron     ()  ug/dL


 


TIBC     (250-450)  ug/dL


 


% Saturation     


 


Ferritin     (8-388)  ng/mL


 


Creatine Kinase     ()  U/L


 


Creatine Kinase Index     (0.0-2.5)  %


 


CK-MB (CK-2)     (0.00-3.60)  ng/mL


 


Troponin I     (0.000-0.056)  ng/mL


 


NT-Pro-B Natriuret Pep     (0-125)  pg/mL


 


Vitamin B12     (193-986)  pg/mL


 


Folate     (8.6-58.9)  ng/mL














  06/17/19 06/17/19 06/17/19 Range/Units





  06:40 06:40 07:19 


 


WBC     (4.0-10.2)  K/uL


 


RBC     (3.77-5.09)  M/uL


 


Hgb     (11.7-15.5)  g/dL


 


Hct     (34.0-46.0)  %


 


MCV     (84.0-98.0)  fL


 


MCH     (28.2-33.3)  pg


 


MCHC     (31.7-36.0)  g/dL


 


RDW     (11.2-14.1)  %


 


Plt Count     (150-350)  K/uL


 


Neut % (Auto)     (45.0-80.0)  %


 


Lymph % (Auto)     (10.0-50.0)  %


 


Mono % (Auto)     (2.0-14.0)  %


 


Eos % (Auto)     (0.0-5.0)  %


 


Baso % (Auto)     (0.0-2.0)  %


 


Neut # (Auto)     (1.40-7.00)  K/uL


 


Lymph # (Auto)     (0.50-3.50)  K/uL


 


Mono # (Auto)     (0.00-1.00)  K/uL


 


Eos # (Auto)     (0.00-0.50)  K/uL


 


Baso # (Auto)     (0.00-0.20)  K/uL


 


Sodium  136    (136-145)  mmol/L


 


Potassium  5.0    (3.5-5.1)  mmol/L


 


Chloride  104    ()  mmol/L


 


Carbon Dioxide  23.7    (21.0-32.0)  mmol/L


 


BUN  18    (7-18)  mg/dL


 


Creatinine  0.76    (0.51-1.17)  mg/dL


 


Est Cr Clr Drug Dosing  52.89    mL/min


 


Estimated GFR (MDRD)  > 60    mL/min


 


Glucose  227 H    ()  mg/dL


 


POC Glucose    216 H  ()  mg/dl


 


Calcium  8.5    (8.5-10.1)  mg/dL


 


Magnesium  1.5 L    (1.8-2.4)  mg/dL


 


Iron   37 L   ()  ug/dL


 


TIBC   167 L   (250-450)  ug/dL


 


% Saturation   22.19023   


 


Ferritin   216   (8-388)  ng/mL


 


Creatine Kinase  39    ()  U/L


 


Creatine Kinase Index  4.1 H    (0.0-2.5)  %


 


CK-MB (CK-2)  1.60    (0.00-3.60)  ng/mL


 


Troponin I  0.000    (0.000-0.056)  ng/mL


 


NT-Pro-B Natriuret Pep  957 H    (0-125)  pg/mL


 


Vitamin B12  313    (193-986)  pg/mL


 


Folate  18.8    (8.6-58.9)  ng/mL














  06/17/19 Range/Units





  11:05 


 


WBC   (4.0-10.2)  K/uL


 


RBC   (3.77-5.09)  M/uL


 


Hgb   (11.7-15.5)  g/dL


 


Hct   (34.0-46.0)  %


 


MCV   (84.0-98.0)  fL


 


MCH   (28.2-33.3)  pg


 


MCHC   (31.7-36.0)  g/dL


 


RDW   (11.2-14.1)  %


 


Plt Count   (150-350)  K/uL


 


Neut % (Auto)   (45.0-80.0)  %


 


Lymph % (Auto)   (10.0-50.0)  %


 


Mono % (Auto)   (2.0-14.0)  %


 


Eos % (Auto)   (0.0-5.0)  %


 


Baso % (Auto)   (0.0-2.0)  %


 


Neut # (Auto)   (1.40-7.00)  K/uL


 


Lymph # (Auto)   (0.50-3.50)  K/uL


 


Mono # (Auto)   (0.00-1.00)  K/uL


 


Eos # (Auto)   (0.00-0.50)  K/uL


 


Baso # (Auto)   (0.00-0.20)  K/uL


 


Sodium   (136-145)  mmol/L


 


Potassium   (3.5-5.1)  mmol/L


 


Chloride   ()  mmol/L


 


Carbon Dioxide   (21.0-32.0)  mmol/L


 


BUN   (7-18)  mg/dL


 


Creatinine   (0.51-1.17)  mg/dL


 


Est Cr Clr Drug Dosing   mL/min


 


Estimated GFR (MDRD)   mL/min


 


Glucose   ()  mg/dL


 


POC Glucose  238 H  ()  mg/dl


 


Calcium   (8.5-10.1)  mg/dL


 


Magnesium   (1.8-2.4)  mg/dL


 


Iron   ()  ug/dL


 


TIBC   (250-450)  ug/dL


 


% Saturation   


 


Ferritin   (8-388)  ng/mL


 


Creatine Kinase   ()  U/L


 


Creatine Kinase Index   (0.0-2.5)  %


 


CK-MB (CK-2)   (0.00-3.60)  ng/mL


 


Troponin I   (0.000-0.056)  ng/mL


 


NT-Pro-B Natriuret Pep   (0-125)  pg/mL


 


Vitamin B12   (193-986)  pg/mL


 


Folate   (8.6-58.9)  ng/mL











Lee Results Last 24 Hours: 





None


Med Orders - Current: 


 Current Medications





Acetaminophen (Tylenol Extra Strength)  1,000 mg PO Q6H PRN


   PRN Reason: Pain/Fever


   Last Admin: 06/16/19 23:10 Dose:  1,000 mg


Amlodipine Besylate (Norvasc)  10 mg PO DAILY [facility]


   Last Admin: 06/17/19 09:05 Dose:  10 mg


Aspirin (Halfprin)  81 mg PO DAILY [facility]


   Last Admin: 06/17/19 08:59 Dose:  81 mg


Calcium Carbonate (Oystcal-D 625 Mg-125 Units)  1 tab PO DAILY [facility]


   Last Admin: 06/17/19 08:59 Dose:  1 tab


Carvedilol (Coreg)  25 mg PO BIDAC [facility]


   Last Admin: 06/17/19 09:02 Dose:  25 mg


Citalopram Hydrobromide (Celexa)  20 mg PO DAILY [facility]


   Last Admin: 06/17/19 08:59 Dose:  20 mg


Clonidine HCl (Catapres)  0.1 mg PO QPM [facility]


   Last Admin: 06/16/19 17:15 Dose:  0.1 mg


Coenzyme Q10 (Coenzyme Q10)  100 mg PO TID [facility]


   Last Admin: 06/17/19 11:59 Dose:  100 mg


Enalapril Maleate (Vasotec)  20 mg PO BID [facility]


   Last Admin: 06/17/19 09:03 Dose:  20 mg


Enoxaparin Sodium (Lovenox)  40 mg SUBCUT Q24H [facility]


   Last Admin: 06/16/19 12:59 Dose:  40 mg


Ferrous Sulfate (Ferrous Sulfate)  325 mg PO BID [facility]


Furosemide (Lasix)  40 mg PO DAILY [facility]


Guaifenesin (Mucinex)  600 mg PO BID [facility]


   Last Admin: 06/17/19 08:59 Dose:  600 mg


Insulin Glargine (Lantus)  22 unit SUBCUT BID [facility]


Insulin Human Regular (Humulin R)  0 unit SUBCUT TIDAC [facility]; Protocol


   Last Admin: 06/17/19 11:59 Dose:  6 units


Lovastatin (Mevacor)  20 mg PO DAILY@1700 [facility]


   Last Admin: 06/16/19 17:15 Dose:  20 mg


Magnesium Oxide (Magnesium Oxide)  400 mg PO BID [facility]


Omeprazole (Omeprazole)  20 mg PO DAILY [facility]


   Last Admin: 06/17/19 09:05 Dose:  20 mg


Senna/Docusate Sodium (Senna Plus)  1 tab PO BID [facility]


   Last Admin: 06/17/19 09:03 Dose:  1 tab


Sodium Chloride (Saline Flush)  10 ml FLUSH ASDIRECTED PRN


   PRN Reason: Keep Vein Open


   Last Admin: 06/17/19 10:24 Dose:  10 ml


Spironolactone (Aldactone)  25 mg PO BID [facility]


Temazepam (Restoril)  15 mg PO BEDTIME PRN


   PRN Reason: Insomnia


   Last Admin: 06/16/19 23:10 Dose:  15 mg





Discontinued Medications





Acetaminophen (Tylenol Extra Strength)  1,000 mg PO Q4HR [facility]


   Last Admin: 06/14/19 21:23 Dose:  Not Given


Acetaminophen (Tylenol Extra Strength)  1,000 mg PO Q6H [facility]


   Last Admin: 06/14/19 21:23 Dose:  Not Given


Acetaminophen/Codeine Phosphate (Tylenol With Codeine No.3 300mg/30mg)  1 tab 

PO BID PRN


   PRN Reason: Pain


Clonidine HCl (Catapres)  0.1 mg PO BID [facility]


   Last Admin: 06/16/19 07:46 Dose:  0.1 mg


Famotidine (Pepcid)  20 mg PO BID PRN


   PRN Reason: INDIGETION


Ferrous Sulfate (Ferrous Sulfate)  325 mg PO DAILY [facility]


   Last Admin: 06/17/19 09:06 Dose:  325 mg


Fish Oil (Fish Oil)  1 gm PO DAILY [facility]


   Last Admin: 06/16/19 07:47 Dose:  1 gm


Furosemide (Lasix)  20 mg PO DAILY [facility]


   Last Admin: 06/17/19 09:00 Dose:  20 mg


Furosemide (Lasix)  20 mg IVPUSH ONETIME ONE


   Stop: 06/17/19 09:43


   Last Admin: 06/17/19 10:23 Dose:  20 mg


Sodium Chloride (Sodium Chloride 3%)  500 mls @ 15 mls/hr IV ASDIRECTED [facility]


   Stop: 06/16/19 00:43


   Last Infusion: 06/15/19 17:56 Dose:  15 mls/hr


Sodium Chloride (Normal Saline)  1,000 mls @ 100 mls/hr IV ASDIRECTED [facility]


   Last Admin: 06/16/19 02:17 Dose:  100 mls/hr


Insulin Glargine (Lantus)  30 unit SUBCUT BEDTIME [facility]


   Last Admin: 06/15/19 20:52 Dose:  30 units


Insulin Glargine (Lantus)  20 unit SUBCUT BID [facility]


   Last Admin: 06/17/19 09:06 Dose:  20 units


Insulin Human Regular (Humulin R)  0 unit SUBCUT BIDAC [facility]; Protocol


   Last Admin: 06/15/19 07:26 Dose:  12 units


Insulin Human Regular (Humulin R)  10 unit SUBCUT ONETIME ONE


   Stop: 06/15/19 19:54


Insulin Human Regular (Humulin R)  10 unit SUBCUT ONETIME ONE


   Stop: 06/14/19 20:28


   Last Admin: 06/14/19 21:37 Dose:  10 unit


Magnesium Citrate (Citrate Of Magnesia)  300 ml PO ONETIME ONE


   Stop: 06/14/19 21:09


   Last Admin: 06/14/19 21:39 Dose:  296 ml


Magnesium Oxide (Magnesium Oxide)  400 mg PO DAILY [facility]


   Last Admin: 06/17/19 09:00 Dose:  400 mg


Magnesium Sulfate/Dextrose (Magnesium Sulfate In D5w 100 Premix)  1 gm IV 

ONETIME ONE


   Stop: 06/14/19 18:13


   Last Admin: 06/14/19 18:59 Dose:  1 gm


Magnesium Sulfate/Dextrose (Magnesium Sulfate In D5w 100 Premix)  1 gm IV 

ONETIME ONE


   Stop: 06/14/19 22:01


Magnesium Sulfate/Dextrose (Magnesium Sulfate In D5w 100 Premix)  1 gm IV 

ONETIME ONE


   Stop: 06/14/19 23:01


   Last Admin: 06/14/19 23:16 Dose:  1 gm


Meclizine HCl (Antivert)  25 mg PO Q4HR PRN


   PRN Reason: Dizziness


Metolazone (Zaroxolyn)  2.5 mg PO MOTH@08 [facility]


Quetiapine Fumarate (Seroquel)  12.5 mg PO DAILY [facility]


   Last Admin: 06/15/19 07:23 Dose:  12.5 mg


Quetiapine Fumarate (Seroquel)  12.5 mg PO BEDTIME [facility]


Sodium Chloride (Sodium Chloride)  1 gm PO DAILY [facility]


   Last Admin: 06/16/19 07:46 Dose:  1 gm


Spironolactone (Aldactone)  12.5 mg PO DAILY [facility]


   Last Admin: 06/16/19 07:47 Dose:  12.5 mg


Spironolactone (Aldactone)  12.5 mg PO BID [facility]


   Last Admin: 06/17/19 09:01 Dose:  12.5 mg


Spironolactone (Aldactone)  12.5 mg PO ONETIME ONE


   Stop: 06/17/19 09:50


   Last Admin: 06/17/19 10:22 Dose:  12.5 mg











- Exam


Quality Assessment: DVT Prophylaxis (Lovenox).  No: Supplemental Oxygen, 

Central Line/PICC, Urine Catheter, Skin Breakdown, Restraints


General: Alert, Cooperative.  No: Oriented (Stable mild confusion)


HEENT: Pupils Equal, Pupils Reactive, EOMI, Mucous Membr. Moist/Pink, Other (

She is wearing glasses).  No: Scleral Icterus


Neck: Supple, Trachea Midline, No JVD, No Thyromegaly, Carotid Bruit (Mild 

bilateral carotid bruits).  No: Lymphadenopathy


Lungs: Clear to Auscultation, Normal Respiratory Effort.  No: Rhonchi, Wheezing


Cardiovascular: Regular Rate, Regular Rhythm, No Murmurs.  No: Gallops, Rubs


GI/Abdominal Exam: Normal Bowel Sounds, Soft, Non-Tender, No Organomegaly, No 

Distention, No Abnormal Bruit, No Mass, Other (Obese).  No: Guarding


 (Female) Exam: Deferred


Back Exam: Full Range of Motion, Other (Kyphosismild).  No: CVA Tenderness (L)

, CVA Tenderness (R), Muscle Spasm, Paraspinal Tenderness, Vertebral Tenderness


Extremities: Non-Tender, Normal Capillary Refill, Pedal Edema (Improved mild 

bilateral pedal/pretibial edema), Limited Range of Motion (Mild in the 

shoulders bilaterally).  No: No Pedal Edema, Kiana's Sign


Peripheral Pulses: 2+: Radial (L), Radial (R), Dorsalis Pedis (L), Dorsalis 

Pedis (R)


Skin: Warm, Dry, Intact, Ecchymosis (As above)


Neurological: No New Focal Deficit, Other (Stable mild confusion)


Psy/Mental Status: Alert, Normal Affect, Normal Mood.  No: Hallucinations, 

Withdrawal Symptoms





- Problem List & Annotations


(1) Hypertension


SNOMED Code(s): 95342318


   Code(s): I10 - ESSENTIAL (PRIMARY) HYPERTENSION   Status: Chronic   Priority

: Medium   Current Visit: Yes   


Qualifiers: 


   Hypertension type: essential hypertension   Qualified Code(s): I10 - 

Essential (primary) hypertension   


Annotation/Comment:: Systolic blood pressures in the 190s this morning however 

in the 140s yesterday afternoon. Add additional Imdur for blood pressure 

control and as cardiac prophylaxis Continued variable blood pressures 

throughout this hospitalization. Some bradycardia on 6/16  with a.m. dose of 

Coreg held. We did  change her clonidine to an every afternoon rather than 

twice a day regimen for now with continuation of previous Coreg dose. Further 

hospitalization required for medication adjustments, including for her diabetes 

as below, resulting in an extended hospitalization stay.   





(2) Hyponatremia


SNOMED Code(s): 55064850


   Code(s): E87.1 - HYPO-OSMOLALITY AND HYPONATREMIA   Status: Chronic   

Priority: Medium   Current Visit: Yes   Annotation/Comment:: Discontinued IV 

fluids which were initiated during early phases of this hospitalization. Sodium 

improved at this time with CHF at a probable component. Chronic issues with low 

sodium.  She was previously supposed to be taking daily oral supplement. Adjust 

her diuretic therapy secondary to her CHF.   





(3) Pick's disease


SNOMED Code(s): 87078548


   Code(s): G31.01 - PICK'S DISEASE; F02.80 - DEMENTIA IN OTH DISEASES CLASSD 

ELSWHR W/O BEHAVRL DISTURB   Status: Chronic   Priority: High   Current Visit: 

Yes   


Qualifiers: 


   Dementia behavioral disturbance: without behavioral disturbance   Qualified 

Code(s): G31.01 - Pick's disease; F02.80 - Dementia in other diseases 

classified elsewhere without behavioral disturbance   


Annotation/Comment:: Patient would likely benefit from updated Neurology 

evaluation. This may be contributing to patient's noncompliance, but patient 

also states that she is not trusting that she needs to be on all of these 

medications and has issues affording her meds. Given the severity of her 

noncompliance issues and lack of insight into the problem/consequences, it 

might be in her best interest to be placed in a living situation where she gets 

consistent daily assistance with medication administration. She has had home 

health doing weekly med set up in past but patient was still non-compliant when 

left to her own device. Per the family's request the degree to swing bed care 

placement on a short-term basis per the patient's request for now. Patient does 

not wish to be admitted to the nursing home, however. Physical therapy and 

occupational therapy has evaluated the patient today and has qualified him for 

treatment of his bilateral shoulder pain.  consultation also in 

effect for possible nursing home placement on a longer-term basis. Change from 

Pepcid to Prilosec secondary to her confusion.   





(4) Hypomagnesemia


SNOMED Code(s): 920942392


   Code(s): E83.42 - HYPOMAGNESEMIA   Status: Acute   Priority: Medium   

Current Visit: Yes   Annotation/Comment:: Returned hypomagnesemia on 6/17 

secondary to IV Lasix therapy with increase of her magnesium oxide 

supplementation.    





(5) CAD (coronary artery disease)


SNOMED Code(s): 19721830


   Code(s): I25.10 - ATHSCL HEART DISEASE OF NATIVE CORONARY ARTERY W/O ANG 

PCTRS   Status: Chronic   Priority: Medium   Current Visit: Yes   


Qualifiers: 


   Coronary Disease-Associated Artery/Lesion type: unspecified vessel or lesion 

type   Associated angina: without angina 


Annotation/Comment:: Noted on past medical history problem list. No chest pain 

or anginal type symptoms despite mildly elevated BNP on admission. Seroquel 

discontinued on 6/16 secondary to his CHF effects. CK index artifactually 

elevated very to low baseline CK. Additional Imdur as above.   





(6) CHF (congestive heart failure)


SNOMED Code(s): 73254631


   Code(s): I50.9 - HEART FAILURE, UNSPECIFIED   Status: Chronic   Priority: 

Medium   Current Visit: Yes   


Qualifiers: 


   Heart failure type: unspecified   Heart failure chronicity: chronic   

Qualified Code(s): I50.9 - Heart failure, unspecified   


Annotation/Comment:: Uncertain as to when last evaluation/echo performed. Note 

current FULL CODE STATUS. Consider echocardiogram on an outpatient basis after 

her medications have been adjusted depending on her family's wishes. Adjust 

diuretic therapy with caution secondary to her recent electrolyte disturbances 

prior to this hospitalization.   





(7) COPD (chronic obstructive pulmonary disease)


SNOMED Code(s): 42527261


   Code(s): J44.9 - CHRONIC OBSTRUCTIVE PULMONARY DISEASE, UNSPECIFIED   Status

: Chronic   Priority: Low   Current Visit: No   


Qualifiers: 


   COPD type: emphysema   Emphysema type: panlobular   Qualified Code(s): J43.1 

- Panlobular emphysema   


Annotation/Comment:: Stable by history with no medical therapy required. No 

recent fever, bronchitic type symptoms, etc.. Note history of sleep apnea with 

patient not currently using a CPAP. Add Mucinex for her nonspecific oral 

mucous. Leukocytosis on admission has resolved   





(8) Iron (Fe) deficiency anemia


SNOMED Code(s): 64391769


   Code(s): D50.9 - IRON DEFICIENCY ANEMIA, UNSPECIFIED   Status: Chronic   

Priority: Medium   Current Visit: Yes   


Qualifiers: 


   Iron deficiency anemia type: unspecified iron deficiency   Qualified Code(s)

: D50.9 - Iron deficiency anemia, unspecified   


Annotation/Comment:: Iron studies on 6/17 show persistent iron deficiency with 

increase of her iron supplementation on 6/17. Hemoglobin stable. Recommend 

repeat iron studies in 4 weeks.   





(9) IDDM (insulin dependent diabetes mellitus)


SNOMED Code(s): 04142570


   Code(s): E11.9 - TYPE 2 DIABETES MELLITUS WITHOUT COMPLICATIONS; Z79.4 - 

LONG TERM (CURRENT) USE OF INSULIN   Status: Chronic   Priority: High   Current 

Visit: Yes   Annotation/Comment:: Note patient's diabetes is under extremely 

poor control with glycosylated hemoglobin of 12.5 on admission. She also has 

significant hypertriglyceridemia with sliding scale in effect, however further 

adjustments required during this hospitalization. Home health has not been 

effective in the past secondary to patient's confusion and medication 

noncompliance. Note history of diabetic nephropathy and neuropathy. Insulin 

therapy was increased on 6/16 with further adjustment of insulin therapy 

required resulting in extended hospitalization as above.   





(10) Hyperlipidemia


SNOMED Code(s): 81683785


   Code(s): E78.5 - HYPERLIPIDEMIA, UNSPECIFIED   Status: Chronic   Priority: 

Medium   Current Visit: Yes   


Qualifiers: 


   Hyperlipidemia type: mixed hyperlipidemia   Qualified Code(s): E78.2 - Mixed 

hyperlipidemia   


Annotation/Comment:: Patient did have nonspecific intolerance to Zocor in the 

past. She did tolerate Mevacor well, which was initiated on 6/16, with 

additional aggressive coenzyme Q10 therapy.  Significant hypertriglyceridemia 

secondary to her uncontrolled IDDM. Continue further medication and dietary 

adjustments. Weight loss in moderation advisable. Repeat lipid panel in about 3 

months.   





- Problem List Review


Problem List Initiated/Reviewed/Updated: Yes





- My Orders


Last 24 Hours: 


My Active Orders





06/16/19 17:00


Lovastatin [Mevacor]   20 mg PO DAILY@1700 





06/16/19 18:00


Ubidecarenone [Coenzyme Q10]   100 mg PO TID 


cloNIDine [Catapres]   0.1 mg PO QPM 





06/17/19 05:11


Chest 2V [CR] Routine 





06/17/19 08:00


Omeprazole   20 mg PO DAILY 





06/17/19 18:00


Ferrous Sulfate   325 mg PO BID 


Insulin Glarg,Human.Rec.Analog [LantUS]   22 unit SUBCUT BID 


Magnesium Oxide   400 mg PO BID 


Spironolactone [Aldactone]   25 mg PO BID 





06/18/19 08:00


Furosemide [Lasix]   40 mg PO DAILY 














- Assessment


Assessment:: 





As above








- Plan


Plan:: 


 As above. Extensive precautions were given to the patient, who is in agreement 

with the treatment plan. Anticipate 1 day of initial inpatient care with 

probable transfer to [facility] bed tomorrow.

## 2019-06-18 ENCOUNTER — HOSPITAL ENCOUNTER (INPATIENT)
Dept: HOSPITAL 52 - LL.MS | Age: 74
LOS: 18 days | Discharge: NURSING, REHAB OR LONG-TERM CARE | DRG: 641 | End: 2019-07-06
Attending: FAMILY MEDICINE | Admitting: FAMILY MEDICINE
Payer: MEDICARE

## 2019-06-18 DIAGNOSIS — Z79.4: ICD-10-CM

## 2019-06-18 DIAGNOSIS — Z79.899: ICD-10-CM

## 2019-06-18 DIAGNOSIS — G31.01: ICD-10-CM

## 2019-06-18 DIAGNOSIS — J44.9: ICD-10-CM

## 2019-06-18 DIAGNOSIS — T50.995A: ICD-10-CM

## 2019-06-18 DIAGNOSIS — D50.9: ICD-10-CM

## 2019-06-18 DIAGNOSIS — E78.5: ICD-10-CM

## 2019-06-18 DIAGNOSIS — G89.29: ICD-10-CM

## 2019-06-18 DIAGNOSIS — K59.09: ICD-10-CM

## 2019-06-18 DIAGNOSIS — M54.9: ICD-10-CM

## 2019-06-18 DIAGNOSIS — Z91.19: ICD-10-CM

## 2019-06-18 DIAGNOSIS — G47.30: ICD-10-CM

## 2019-06-18 DIAGNOSIS — I11.0: ICD-10-CM

## 2019-06-18 DIAGNOSIS — E11.42: ICD-10-CM

## 2019-06-18 DIAGNOSIS — F09: ICD-10-CM

## 2019-06-18 DIAGNOSIS — N28.9: ICD-10-CM

## 2019-06-18 DIAGNOSIS — E87.5: Primary | ICD-10-CM

## 2019-06-18 DIAGNOSIS — I50.9: ICD-10-CM

## 2019-06-18 DIAGNOSIS — R00.1: ICD-10-CM

## 2019-06-18 DIAGNOSIS — F41.8: ICD-10-CM

## 2019-06-18 DIAGNOSIS — Z79.82: ICD-10-CM

## 2019-06-18 DIAGNOSIS — E11.649: ICD-10-CM

## 2019-06-18 DIAGNOSIS — F02.80: ICD-10-CM

## 2019-06-18 DIAGNOSIS — E87.1: ICD-10-CM

## 2019-06-18 DIAGNOSIS — I25.10: ICD-10-CM

## 2019-06-18 DIAGNOSIS — E83.42: ICD-10-CM

## 2019-06-18 LAB
CHLORIDE SERPL-SCNC: 103 MMOL/L (ref 98–107)
SODIUM SERPL-SCNC: 136 MMOL/L (ref 136–145)

## 2019-06-18 RX ADMIN — CALCIUM CARBONATE-CHOLECALCIFEROL TAB 250 MG-125 UNIT SCH TAB: 250-125 TAB at 08:07

## 2019-06-18 RX ADMIN — INSULIN GLARGINE SCH UNIT: 100 INJECTION, SOLUTION SUBCUTANEOUS at 08:12

## 2019-06-18 RX ADMIN — INSULIN LISPRO SCH UNITS: 100 INJECTION, SUSPENSION SUBCUTANEOUS at 17:37

## 2019-06-18 RX ADMIN — OMEPRAZOLE SCH MG: 20 CAPSULE, DELAYED RELEASE ORAL at 08:07

## 2019-06-18 RX ADMIN — INSULIN HUMAN SCH UNITS: 100 INJECTION, SOLUTION PARENTERAL at 08:10

## 2019-06-18 NOTE — [id number].DCSUM1
**Discharge Summary





- Hospital Course


HPI Initial Comments: 





See emergency room note/admission H&P


Brief History: See emergency room note/admission H&P


Diagnosis: Stroke: No


Modified Raghu Scale: No Symptoms at All


Modified Louisa Scale Score: 0





- Discharge Data


Discharge Date: 06/18/19


Discharge Disposition: DC/Tfer W/I Hosp To [place] [id number]


Condition: Fair





- Discharge Diagnosis/Problem(s)


(1) Hypertension


SNOMED Code(s): 88992374


   ICD Code: I10 - ESSENTIAL (PRIMARY) HYPERTENSION   Status: Chronic   Priority

: Medium   Current Visit: Yes   Problem Details: Systolic blood pressures 

continue to be under poor control during inpatient care, including systolic 

blood pressures in the 190s prior to transfer to swing bed. Her Imdur therapy 

will be increased at time of transfer with consideration of discontinuation of 

her clonidine in the future if possible secondary to medication noncompliance, 

finances, and previous bradycardia. Continue vital signs on a every shift basis 

during her swing bed care with further medication adjustments likely require 

during the next couple of weeks. Imdur initiated on 6/17 for blood pressure 

control and as cardiac prophylaxis with no chest pain or anginal type 

symptoms.Some bradycardia on 6/16  with a.m. dose of Coreg held. We did  change 

her clonidine to an every afternoon rather than twice a day regimen for now 

with continuation of previous Coreg dose and further medication adjustments 

required as above. An extended inpatient hospitalization stay was required 

secondary to medication adjustments for her hypertension, diabetes, etc..   


Qualifiers: 


   Hypertension type: essential hypertension   Qualified Code(s): I10 - 

Essential (primary) hypertension   





(2) Hyponatremia


SNOMED Code(s): 58084717


   ICD Code: E87.1 - HYPO-OSMOLALITY AND HYPONATREMIA   Status: Chronic   

Priority: Medium   Current Visit: Yes   Problem Details: Discontinued IV fluids

, which were initiated during early phases of this hospitalization, on 6/16. 

Sodium improved at this time with CHF as a probable component. Chronic issues 

with low sodium.  She was previously supposed to be taking daily oral 

supplement. Continue adjustment of her diuretic therapy secondary to her CHF 

and severe electrolyte imbalances prior to admission.   





(3) Pick's disease


SNOMED Code(s): 69563095


   ICD Code: G31.01 - PICK'S DISEASE; F02.80 - DEMENTIA IN OTH DISEASES CLASSD 

ELSWHR W/O BEHAVRL DISTURB   Status: Chronic   Priority: High   Current Visit: 

Yes   Problem Details: Patient would likely benefit from updated Neurology 

evaluation. This may be contributing to patient's noncompliance, but patient 

also states that she is not trusting that she needs to be on all of these 

medications and has issues affording her meds. Given the severity of her 

noncompliance issues and lack of insight into the problem/consequences, it 

might be in her best interest to be placed in a living situation where she gets 

consistent daily assistance with medication administration. She has had home 

health doing weekly med set up in past but patient was still non-compliant when 

left to her own device. Per the family's request transfer to swing bed care 

placement on a short-term basis per the patient's request for now. Patient does 

not wish to be admitted to the nursing home, however, and has just moved to 

this area in May. Physical therapy and occupational therapy has evaluated the 

patient and has qualified her for treatment of her bilateral shoulder pain. 

 consultation also in effect for possible nursing home placement 

on a longer-term basis, help in the costs of her medications, etc. Change from 

Pepcid to Prilosec secondary to her confusion.   


Qualifiers: 


   Dementia behavioral disturbance: without behavioral disturbance   Qualified 

Code(s): G31.01 - Pick's disease; F02.80 - Dementia in other diseases 

classified elsewhere without behavioral disturbance   





(4) Hypomagnesemia


SNOMED Code(s): 384454514


   ICD Code: E83.42 - HYPOMAGNESEMIA   Status: Acute   Priority: Medium   

Current Visit: Yes   Problem Details: Returned hypomagnesemia on 6/17, which 

was somewhat progressive on 6/18 secondary to IV Lasix therapy. Further 

increase of her magnesium oxide supplementation, which should also be 

beneficial for her chronic constipation..    





(5) CAD (coronary artery disease)


SNOMED Code(s): 71776054


   ICD Code: I25.10 - ATHSCL HEART DISEASE OF NATIVE CORONARY ARTERY W/O ANG 

PCTRS   Status: Chronic   Priority: Medium   Current Visit: Yes   Problem 

Details: Noted on past medical history problem list. No chest pain or anginal 

type symptoms despite mildly elevated BNP on admission. Seroquel discontinued 

on 6/16 secondary to its CHF effects. CK index artifactually elevated on 6/17 

secondary to low baseline CK. Additional Imdur as above with negative workup 

for acute MI, including EKG on admission.   


Qualifiers: 


   Coronary Disease-Associated Artery/Lesion type: unspecified vessel or lesion 

type   Associated angina: without angina 





(6) CHF (congestive heart failure)


SNOMED Code(s): 13231066


   ICD Code: I50.9 - HEART FAILURE, UNSPECIFIED   Status: Chronic   Priority: 

Medium   Current Visit: Yes   Problem Details: As above. Improved BNP prior to 

transfer. Continue medication adjustments as above. Uncertain as to when last 

evaluation/echo performed. Note current FULL CODE STATUS. Consider 

echocardiogram on an outpatient basis after her medications have been adjusted 

depending on her family's wishes. Adjust diuretic therapy with caution 

secondary to her recent electrolyte disturbances prior to this hospitalization 

as above.   


Qualifiers: 


   Heart failure type: unspecified   Heart failure chronicity: chronic   

Qualified Code(s): I50.9 - Heart failure, unspecified   





(7) COPD (chronic obstructive pulmonary disease)


SNOMED Code(s): 13721468


   ICD Code: J44.9 - CHRONIC OBSTRUCTIVE PULMONARY DISEASE, UNSPECIFIED   Status

: Chronic   Priority: Low   Current Visit: No   Problem Details: Stable by 

history with no medical therapy required. No recent fever, bronchitic type 

symptoms, etc.. Note history of sleep apnea with patient not currently using a 

CPAP. Add Mucinex for her nonspecific oral mucous. Leukocytosis on admission 

had resolved without antibiotic therapy, however mild leukocytosis at time of 

transfer. Patient remains afebrile. Secondary to persistent oral mucous 

initiate short course of Keflex therapy and reconnect a UA with additional 

culture and sensitivity, which was not ordered on admission.   


Qualifiers: 


   COPD type: emphysema   Emphysema type: panlobular   Qualified Code(s): J43.1 

- Panlobular emphysema   





(8) Iron (Fe) deficiency anemia


SNOMED Code(s): 41971960


   ICD Code: D50.9 - IRON DEFICIENCY ANEMIA, UNSPECIFIED   Status: Chronic   

Priority: Medium   Current Visit: Yes   Problem Details: Iron studies on 6/17 

show persistent iron deficiency with increase of her iron supplementation on 6/ 17. Hemoglobin stable. Recommend repeat iron studies in 4 weeks.   


Qualifiers: 


   Iron deficiency anemia type: unspecified iron deficiency   Qualified Code(s)

: D50.9 - Iron deficiency anemia, unspecified   





(9) IDDM (insulin dependent diabetes mellitus)


SNOMED Code(s): 24729491


   ICD Code: E11.9 - TYPE 2 DIABETES MELLITUS WITHOUT COMPLICATIONS; Z79.4 - 

LONG TERM (CURRENT) USE OF INSULIN   Status: Chronic   Priority: High   Current 

Visit: Yes   Problem Details: Note patient's diabetes is under extremely poor 

control with glycosylated hemoglobin of 12.5 on admission. She also has 

significant hypertriglyceridemia with sliding scale in effect, however further 

adjustments required during this hospitalization and swing bed care. Attempt to 

change to Humalog mix preparations secondary to finances, etc.. Home health has 

not been effective in the past secondary to patient's confusion and medication 

noncompliance. Note history of diabetic nephropathy and neuropathy. Insulin 

therapy was increased on 6/16 with further adjustment of insulin therapy 

required resulting in extended hospitalization as above.   





(10) Hyperlipidemia


SNOMED Code(s): 26357606


   ICD Code: E78.5 - HYPERLIPIDEMIA, UNSPECIFIED   Status: Chronic   Priority: 

Medium   Current Visit: Yes   Problem Details: Patient did have nonspecific 

intolerance to Zocor in the past. She did tolerate Mevacor well, which was 

initiated on 6/16, with additional aggressive coenzyme Q10 therapy.  

Significant hypertriglyceridemia secondary to her uncontrolled IDDM. Continue 

further medication and dietary adjustments. Weight loss in moderation 

advisable. Repeat lipid panel in about 3 months.   


Qualifiers: 


   Hyperlipidemia type: mixed hyperlipidemia   Qualified Code(s): E78.2 - Mixed 

hyperlipidemia   





(11) Hyperkalemia


SNOMED Code(s): 00899672


   ICD Code: E87.5 - HYPERKALEMIA   Status: Acute   Priority: Medium   Current 

Visit: Yes   Onset Date: 06/18/19   Problem Details: Mild progressive 

hyperkalemia despite Lasix therapy. Continue medication adjustment as before. 

Note patient under multiple medications that to involve possible potassium 

retention.   





- Patient Summary/Data


Operative Procedure(s) Performed: None


Complications: None


Consults: 


 Consultations





06/14/19 19:46


Consult to Case Management/ [CONS] Routine 


OT Evaluation and Treatment [CONS] Routine 


PT Evaluation and Treatment [CONS] Routine 











Labs Pending at D/C: 





None


Recommended Follow-up Testing/Procedures: 





None


Planned Operative Procedure(s) after DC: None


Hospital Course: 





She was admitted to inpatient/acute care on telemetry with multiple medication 

adjustments required secondary to multiple health problems as above. Patient 

did finally agree to swing bed admission on a short-term basis as above. 

Continue physical therapy, occupational therapy, and  

consultations with patient possibly not able to return to independent living 

secondary to previous failure with home health. No complications during this 

hospitalization.





- Patient Instructions


Diet: Diabetic Diet


Diet, Other: 1500-calorie, heart healthy, diverticulosis


Fluid Restriction: 2000 mL


Activity: As Tolerated (And directed by physical therapy/occupational therapy 

with strict fall precautions)


Driving: Do Not Drive


Showering/Bathing: May Shower


Notify Provider of: Fever, Increased Pain, Nausea and/or Vomiting





- Discharge Plan


*PRESCRIPTION DRUG MONITORING PROGRAM REVIEWED*: Not Applicable


*COPY OF PRESCRIPTION DRUG MONITORING REPORT IN PATIENT ABDIEL: Not Applicable


Home Medications: 


 Home Meds





Acetaminophen [Acetaminophen Extra Strength] 1,000 mg PO Q4HR 06/14/19 [History]


Acetaminophen with Codeine [Acetaminophen-Cod #3] 1 tab PO BID PRN 06/14/19 [

History]


Aspirin [Ecotrin EC] 81 mg PO DAILY 06/14/19 [History]


Calcium Carbonate/Vitamin D3 [Calcium 600-Vit D3 800 Tab] 1 tab PO DAILY 06/14/ 19 [History]


Carvedilol 25 mg PO BIDAC 06/14/19 [History]


Citalopram [Citalopram HBr] 20 mg PO DAILY 06/14/19 [History]


Enalapril [Vasotec] 20 mg PO BID 06/14/19 [History]


Ferrous Gluconate 324 mg PO DAILY 06/14/19 [History]


Insulin Aspart [NovoLOG] 0 unit SUBCUT TID PRN 06/14/19 [History]


Insulin Glarg,Human.Rec.Analog [Lantus] 30 unit SUBCUT BEDTIME 06/14/19 [History

]


Lancets 1 each MC ASDIRECTED 06/14/19 [History]


Meclizine [Antivert] 25 mg PO Q4HR PRN 06/14/19 [History]


Omega-3 Fatty Acids/DHA/EPA [Ovega-3 Softgel] 1 each PO DAILY 06/14/19 [History]


QUEtiapine [SEROquel] 12.5 mg PO DAILY 06/14/19 [History]


Ranitidine [Zantac] 150 mg PO BID PRN 06/14/19 [History]


Sennosides/Docusate Sodium [Senna Plus Tablet] 1 tab PO BID 06/14/19 [History]


Sodium Chloride 1 gm PO DAILY 06/14/19 [History]


Spironolactone [Aldactone] 12.5 mg PO DAILY 06/14/19 [History]


amLODIPine [Norvasc] 10 mg PO DAILY 06/14/19 [History]


cloNIDine HCl [Clonidine HCl ER] 0.1 mg PO BID 06/14/19 [History]


metOLazone [Metolazone] 2.5 mg PO MOTH@08 06/14/19 [History]








Oxygen Therapy Mode: Room Air


Patient Handouts:  Furosemide tablets, Hyponatremia, Hypomagnesemia, 

Hyperglycemia, Lovastatin tablets, Guaifenesin oral ER tablets, Co-Enzyme Q10 

oral dosage forms, Sodium Picosulfate, Magnesium Oxide, Citric Acid, Anhydrous 

Oral solution, Magnesium Salts capsules or tablets, immediate release, 

Omeprazole tablets (OTC)


Forms:  ED Department Discharge


Referrals: 


PCP,None [Primary Care Provider] - 





- Discharge Summary/Plan Comment


DC Time >30 min.: Yes (Coordination of care)


Discharge Summary/Plan Comment: 





As above. Extensive precautions were given to the patient, who is in agreement 

with the treatment plan.





- General Info


Date of Service: 06/18/19


Admission Dx/Problem (Free Text: 





1.  Hyperkalemia


2.  Hypomagnesemia


3.  Confusion


4.  IDDM


5. Hyponatremia


Functional Status: Reports: Pain Controlled, Tolerating Diet, Ambulating, 

Urinating, Incentive Spirometry.  Denies: New Symptoms


Numeric/FACES Score: 0





- Review of Systems


General: Denies: Fever, Weakness, Fatigue, Malaise, Chills, Night Sweats, 

Appetite (Good)


HEENT: Reports: Glasses, Other (Oral mucous).  Denies: Contact Lenses, Dysphasia

, Ear Pain, Eye Pain, Headaches, Post Nasal Drip, Sinus Congestion, Sore Throat

, Rhinitis, Visual Changes


Pulmonary: Reports: No Symptoms.  Denies: Shortness of Breath, Pleuritic Chest 

Pain, Cough, Sputum, Wheezing


Cardiovascular: Reports: No Symptoms, Chest Pain.  Denies: Palpitations, 

Dyspnea on Exertion, Orthopnea, PND, Edema (Resolved), Lightheadedness


Gastrointestinal: Reports: No Symptoms, Other (Normal bowel movement yesterday)

.  Denies: Abdominal Pain, Constipation, Decreased Appetite, Diarrhea, 

Difficulty Swallowing, Flatus, Hematochezia, Melena, Nausea, Vomiting


Genitourinary: Reports: Incontinence.  Denies: Dysuria, Frequency, Burning, Pain

, Urgency, Hematuria, Retention, Flank Pain


Musculoskeletal: Reports: Neck Pain (Chronic).  Denies: Shoulder Pain, Arm Pain

, Back Pain, Leg Pain, Joint Swelling


Skin: Reports: No Symptoms.  Denies: Diaphoresis


Neurological: Reports: Confusion (Stable).  Denies: Dizziness, Headache, 

Numbness, Paresthesia, Syncope, Tingling, Trouble Speaking, Weakness


Psychiatric: Reports: Confusion (As above).  Denies: Depression, Anxiety, 

Agitation, Cravings, Hallucinations





- Patient Data


Vitals - Most Recent: 


 Last Vital Signs











Temp  36.4 C   06/18/19 06:00


 


Pulse  63   06/18/19 08:08


 


Resp  18   06/18/19 06:00


 


BP  197/71 H  06/18/19 08:08


 


Pulse Ox  95   06/18/19 06:00








 Vital Signs - 24 hr











  06/17/19 06/17/19 06/17/19





  17:24 17:36 17:37


 


Temperature [ 36.6 C  





Temporal]   


 


Pulse,   





Peripheral   


 


Pulse,   





Peripheral [   





Left Pulse   





Oximetry]   


 


Pulse, 95  





Peripheral [   





Right Pulse   





Oximetry]   


 


Respiratory 18  





Rate   


 


Blood Pressure  162/64 H 162/64 H


 


Blood Pressure 162/64 H  





[Left Upper Arm   





]   


 


Blood Pressure   





[Right Upper   





Arm]   


 


O2 Sat by Pulse 97  





Oximetry   














  06/17/19 06/18/19 06/18/19





  17:39 00:00 06:00


 


Temperature [  36.3 C 36.4 C





Temporal]   


 


Pulse, 64  





Peripheral   


 


Pulse,  63 63





Peripheral [   





Left Pulse   





Oximetry]   


 


Pulse,   





Peripheral [   





Right Pulse   





Oximetry]   


 


Respiratory  18 18





Rate   


 


Blood Pressure 162/64 H  


 


Blood Pressure   





[Left Upper Arm   





]   


 


Blood Pressure  144/72 H 197/71 H





[Right Upper   





Arm]   


 


O2 Sat by Pulse  94 L 95





Oximetry   














  06/18/19 06/18/19 06/18/19





  08:04 08:06 08:08


 


Temperature [   





Temporal]   


 


Pulse,   63





Peripheral   


 


Pulse,   





Peripheral [   





Left Pulse   





Oximetry]   


 


Pulse,   





Peripheral [   





Right Pulse   





Oximetry]   


 


Respiratory   





Rate   


 


Blood Pressure 197/71 H 197/71 H 197/71 H


 


Blood Pressure   





[Left Upper Arm   





]   


 


Blood Pressure   





[Right Upper   





Arm]   


 


O2 Sat by Pulse   





Oximetry   














  06/18/19





  09:00


 


Temperature [ 





Temporal] 


 


Pulse, 





Peripheral 


 


Pulse, 





Peripheral [ 





Left Pulse 





Oximetry] 


 


Pulse, 





Peripheral [ 





Right Pulse 





Oximetry] 


 


Respiratory 





Rate 


 


Blood Pressure 


 


Blood Pressure 





[Left Upper Arm 





] 


 


Blood Pressure 162/64 H





[Right Upper 





Arm] 


 


O2 Sat by Pulse 





Oximetry 











Weight - Most Recent: 82.962 kg


I&O - Last 24 hours: 


 Intake & Output











 06/17/19 06/18/19 06/18/19





 22:59 06:59 14:59


 


Intake Total 1720  


 


Output Total 900  425


 


Balance 820  -425











Imaging Impressions - Last 24 hrs: 





Chest x-ray, PA and lateral, on 6/17/19 shows mild kyphosis with moderate 

osteophytic and osteoporotic changes. Mild Cardiomegaly and centralized CHF 

with mildly elevated right hemidiaphragm. Somewhat prominent proximal aortic 

arch. Probable COPD with no significant pulmonary infiltrates, pneumothorax, 

etc.





Lab Results - Last 24 hrs: 


 Laboratory Results - last 24 hr











  06/17/19 06/17/19 06/17/19 Range/Units





  11:05 17:10 20:14 


 


WBC     (4.0-10.2)  K/uL


 


RBC     (3.77-5.09)  M/uL


 


Hgb     (11.7-15.5)  g/dL


 


Hct     (34.0-46.0)  %


 


MCV     (84.0-98.0)  fL


 


MCH     (28.2-33.3)  pg


 


MCHC     (31.7-36.0)  g/dL


 


RDW     (11.2-14.1)  %


 


Plt Count     (150-350)  K/uL


 


Neut % (Auto)     (45.0-80.0)  %


 


Lymph % (Auto)     (10.0-50.0)  %


 


Mono % (Auto)     (2.0-14.0)  %


 


Eos % (Auto)     (0.0-5.0)  %


 


Baso % (Auto)     (0.0-2.0)  %


 


Neut # (Auto)     (1.40-7.00)  K/uL


 


Lymph # (Auto)     (0.50-3.50)  K/uL


 


Mono # (Auto)     (0.00-1.00)  K/uL


 


Eos # (Auto)     (0.00-0.50)  K/uL


 


Baso # (Auto)     (0.00-0.20)  K/uL


 


Sodium     (136-145)  mmol/L


 


Potassium     (3.5-5.1)  mmol/L


 


Chloride     ()  mmol/L


 


Carbon Dioxide     (21.0-32.0)  mmol/L


 


BUN     (7-18)  mg/dL


 


Creatinine     (0.51-1.17)  mg/dL


 


Est Cr Clr Drug Dosing     mL/min


 


Estimated GFR (MDRD)     mL/min


 


Glucose     ()  mg/dL


 


POC Glucose  238 H  122 H  261 H*  ()  mg/dl


 


Calcium     (8.5-10.1)  mg/dL


 


Magnesium     (1.8-2.4)  mg/dL


 


NT-Pro-B Natriuret Pep     (0-125)  pg/mL














  06/17/19 06/18/19 06/18/19 Range/Units





  21:19 06:55 06:55 


 


WBC   11.2 H   (4.0-10.2)  K/uL


 


RBC   3.80   (3.77-5.09)  M/uL


 


Hgb   10.5 L   (11.7-15.5)  g/dL


 


Hct   31.8 L   (34.0-46.0)  %


 


MCV   83.7 L   (84.0-98.0)  fL


 


MCH   27.6 L   (28.2-33.3)  pg


 


MCHC   33.0   (31.7-36.0)  g/dL


 


RDW   13.7   (11.2-14.1)  %


 


Plt Count   180   (150-350)  K/uL


 


Neut % (Auto)   60.3   (45.0-80.0)  %


 


Lymph % (Auto)   31.3   (10.0-50.0)  %


 


Mono % (Auto)   5.5   (2.0-14.0)  %


 


Eos % (Auto)   2.6   (0.0-5.0)  %


 


Baso % (Auto)   0.3   (0.0-2.0)  %


 


Neut # (Auto)   6.74   (1.40-7.00)  K/uL


 


Lymph # (Auto)   3.49   (0.50-3.50)  K/uL


 


Mono # (Auto)   0.61   (0.00-1.00)  K/uL


 


Eos # (Auto)   0.29   (0.00-0.50)  K/uL


 


Baso # (Auto)   0.03   (0.00-0.20)  K/uL


 


Sodium    136  (136-145)  mmol/L


 


Potassium    5.2 H  (3.5-5.1)  mmol/L


 


Chloride    103  ()  mmol/L


 


Carbon Dioxide    24.5  (21.0-32.0)  mmol/L


 


BUN    23 H  (7-18)  mg/dL


 


Creatinine    0.88  (0.51-1.17)  mg/dL


 


Est Cr Clr Drug Dosing    45.68  mL/min


 


Estimated GFR (MDRD)    > 60  mL/min


 


Glucose    220 H  ()  mg/dL


 


POC Glucose  227 H    ()  mg/dl


 


Calcium    9.3  (8.5-10.1)  mg/dL


 


Magnesium    1.3 L  (1.8-2.4)  mg/dL


 


NT-Pro-B Natriuret Pep    448 H  (0-125)  pg/mL














  06/18/19 Range/Units





  06:55 


 


WBC   (4.0-10.2)  K/uL


 


RBC   (3.77-5.09)  M/uL


 


Hgb   (11.7-15.5)  g/dL


 


Hct   (34.0-46.0)  %


 


MCV   (84.0-98.0)  fL


 


MCH   (28.2-33.3)  pg


 


MCHC   (31.7-36.0)  g/dL


 


RDW   (11.2-14.1)  %


 


Plt Count   (150-350)  K/uL


 


Neut % (Auto)   (45.0-80.0)  %


 


Lymph % (Auto)   (10.0-50.0)  %


 


Mono % (Auto)   (2.0-14.0)  %


 


Eos % (Auto)   (0.0-5.0)  %


 


Baso % (Auto)   (0.0-2.0)  %


 


Neut # (Auto)   (1.40-7.00)  K/uL


 


Lymph # (Auto)   (0.50-3.50)  K/uL


 


Mono # (Auto)   (0.00-1.00)  K/uL


 


Eos # (Auto)   (0.00-0.50)  K/uL


 


Baso # (Auto)   (0.00-0.20)  K/uL


 


Sodium   (136-145)  mmol/L


 


Potassium   (3.5-5.1)  mmol/L


 


Chloride   ()  mmol/L


 


Carbon Dioxide   (21.0-32.0)  mmol/L


 


BUN   (7-18)  mg/dL


 


Creatinine   (0.51-1.17)  mg/dL


 


Est Cr Clr Drug Dosing   mL/min


 


Estimated GFR (MDRD)   mL/min


 


Glucose   ()  mg/dL


 


POC Glucose  208 H  ()  mg/dl


 


Calcium   (8.5-10.1)  mg/dL


 


Magnesium   (1.8-2.4)  mg/dL


 


NT-Pro-B Natriuret Pep   (0-125)  pg/mL








 Laboratory Tests











  06/14/19 06/14/19 06/14/19 Range/Units





  17:36 17:36 17:52 


 


WBC  12.4 H    (4.0-10.2)  K/uL


 


RBC  4.09    (3.77-5.09)  M/uL


 


Hgb  11.5 L    (11.7-15.5)  g/dL


 


Hct  33.7 L    (34.0-46.0)  %


 


MCV  82.4 L    (84.0-98.0)  fL


 


MCH  28.1 L    (28.2-33.3)  pg


 


MCHC  34.1    (31.7-36.0)  g/dL


 


RDW  13.3    (11.2-14.1)  %


 


Plt Count  208    (150-350)  K/uL


 


Neut % (Auto)  55.2    (45.0-80.0)  %


 


Lymph % (Auto)  36.9    (10.0-50.0)  %


 


Mono % (Auto)  5.1    (2.0-14.0)  %


 


Eos % (Auto)  2.6    (0.0-5.0)  %


 


Baso % (Auto)  0.2    (0.0-2.0)  %


 


Neut # (Auto)  6.83    (1.40-7.00)  K/uL


 


Lymph # (Auto)  4.57 H    (0.50-3.50)  K/uL


 


Mono # (Auto)  0.63    (0.00-1.00)  K/uL


 


Eos # (Auto)  0.32    (0.00-0.50)  K/uL


 


Baso # (Auto)  0.03    (0.00-0.20)  K/uL


 


Sodium   128 L   (136-145)  mmol/L


 


Potassium   4.6   (3.5-5.1)  mmol/L


 


Chloride   94 L   ()  mmol/L


 


Carbon Dioxide   22.5   (21.0-32.0)  mmol/L


 


BUN   22 H   (7-18)  mg/dL


 


Creatinine   0.89   (0.51-1.17)  mg/dL


 


Est Cr Clr Drug Dosing   45.18   mL/min


 


Estimated GFR (MDRD)   > 60   mL/min


 


Glucose   372 H   ()  mg/dL


 


POC Glucose     ()  mg/dl


 


Hemoglobin A1c     (4.3-5.7)  %


 


Calcium   8.6   (8.5-10.1)  mg/dL


 


Magnesium   1.0 L   (1.8-2.4)  mg/dL


 


Iron     ()  ug/dL


 


TIBC     (250-450)  ug/dL


 


% Saturation     


 


Ferritin     (8-388)  ng/mL


 


Total Bilirubin   0.3   (0.2-1.0)  mg/dL


 


AST   14 L   (15-37)  U/L


 


ALT   18   (12-78)  U/L


 


Alkaline Phosphatase   90   ()  IU/L


 


Creatine Kinase     ()  U/L


 


Creatine Kinase Index     (0.0-2.5)  %


 


CK-MB (CK-2)     (0.00-3.60)  ng/mL


 


Troponin I     (0.000-0.056)  ng/mL


 


NT-Pro-B Natriuret Pep   520 H   (0-125)  pg/mL


 


Total Protein   7.0   (6.4-8.2)  g/dL


 


Albumin   3.4   (3.4-5.0)  g/dL


 


Triglycerides     ()  mg/dL


 


Cholesterol     (100-200)  mg/dL


 


HDL Cholesterol     (40-60)  mg/dL


 


Vitamin B12     (193-986)  pg/mL


 


Folate     (8.6-58.9)  ng/mL


 


Specimen Type    Urinblad  


 


Urine Color    Light yellow  


 


Urine Appearance    Clear  


 


Urine pH    5.5  (5.0-9.0)  


 


Ur Specific Gravity    1.010  (1.005-1.030)  


 


Urine Protein    100 H  (NEGATIVE)  mg/dL


 


Urine Glucose (UA)    >=1000 H  (NEGATIVE)  mg/dL


 


Urine Ketones    Negative  (NEGATIVE)  mg/dL


 


Urine Occult Blood    Trace-lysed H  (NEGATIVE)  


 


Urine Nitrite    Negative  (NEGATIVE)  


 


Urine Bilirubin    Negative  (NEGATIVE)  


 


Urine Urobilinogen    0.2  (0.2-1.0)  E.U./dL


 


Ur Leukocyte Esterase    Negative  (NEGATIVE)  


 


Urine RBC    0-5  /HPF


 


Urine WBC    0-5  /HPF


 


Ur Epithelial Cells    Many H  /LPF


 


Urine Bacteria    Moderate H  (NONE TO FEW)  /HPF


 


Urinalysis Comment      














  06/14/19 06/15/19 06/15/19 Range/Units





  21:35 07:16 07:17 


 


WBC     (4.0-10.2)  K/uL


 


RBC     (3.77-5.09)  M/uL


 


Hgb     (11.7-15.5)  g/dL


 


Hct     (34.0-46.0)  %


 


MCV     (84.0-98.0)  fL


 


MCH     (28.2-33.3)  pg


 


MCHC     (31.7-36.0)  g/dL


 


RDW     (11.2-14.1)  %


 


Plt Count     (150-350)  K/uL


 


Neut % (Auto)     (45.0-80.0)  %


 


Lymph % (Auto)     (10.0-50.0)  %


 


Mono % (Auto)     (2.0-14.0)  %


 


Eos % (Auto)     (0.0-5.0)  %


 


Baso % (Auto)     (0.0-2.0)  %


 


Neut # (Auto)     (1.40-7.00)  K/uL


 


Lymph # (Auto)     (0.50-3.50)  K/uL


 


Mono # (Auto)     (0.00-1.00)  K/uL


 


Eos # (Auto)     (0.00-0.50)  K/uL


 


Baso # (Auto)     (0.00-0.20)  K/uL


 


Sodium    134 L  (136-145)  mmol/L


 


Potassium    4.7  (3.5-5.1)  mmol/L


 


Chloride    101  ()  mmol/L


 


Carbon Dioxide    25.4  (21.0-32.0)  mmol/L


 


BUN    24 H  (7-18)  mg/dL


 


Creatinine    0.84  (0.51-1.17)  mg/dL


 


Est Cr Clr Drug Dosing    47.87  mL/min


 


Estimated GFR (MDRD)    > 60  mL/min


 


Glucose    338 H  ()  mg/dL


 


POC Glucose  334 H*  331 H*   ()  mg/dl


 


Hemoglobin A1c     (4.3-5.7)  %


 


Calcium    7.8 L  (8.5-10.1)  mg/dL


 


Magnesium    2.0  (1.8-2.4)  mg/dL


 


Iron     ()  ug/dL


 


TIBC     (250-450)  ug/dL


 


% Saturation     


 


Ferritin     (8-388)  ng/mL


 


Total Bilirubin     (0.2-1.0)  mg/dL


 


AST     (15-37)  U/L


 


ALT     (12-78)  U/L


 


Alkaline Phosphatase     ()  IU/L


 


Creatine Kinase     ()  U/L


 


Creatine Kinase Index     (0.0-2.5)  %


 


CK-MB (CK-2)     (0.00-3.60)  ng/mL


 


Troponin I     (0.000-0.056)  ng/mL


 


NT-Pro-B Natriuret Pep     (0-125)  pg/mL


 


Total Protein     (6.4-8.2)  g/dL


 


Albumin     (3.4-5.0)  g/dL


 


Triglycerides     ()  mg/dL


 


Cholesterol     (100-200)  mg/dL


 


HDL Cholesterol     (40-60)  mg/dL


 


Vitamin B12     (193-986)  pg/mL


 


Folate     (8.6-58.9)  ng/mL


 


Specimen Type     


 


Urine Color     


 


Urine Appearance     


 


Urine pH     (5.0-9.0)  


 


Ur Specific Gravity     (1.005-1.030)  


 


Urine Protein     (NEGATIVE)  mg/dL


 


Urine Glucose (UA)     (NEGATIVE)  mg/dL


 


Urine Ketones     (NEGATIVE)  mg/dL


 


Urine Occult Blood     (NEGATIVE)  


 


Urine Nitrite     (NEGATIVE)  


 


Urine Bilirubin     (NEGATIVE)  


 


Urine Urobilinogen     (0.2-1.0)  E.U./dL


 


Ur Leukocyte Esterase     (NEGATIVE)  


 


Urine RBC     /HPF


 


Urine WBC     /HPF


 


Ur Epithelial Cells     /LPF


 


Urine Bacteria     (NONE TO FEW)  /HPF


 


Urinalysis Comment     














  06/15/19 06/15/19 06/15/19 Range/Units





  07:17 07:17 11:10 


 


WBC  10.7 H    (4.0-10.2)  K/uL


 


RBC  3.78    (3.77-5.09)  M/uL


 


Hgb  10.8 L    (11.7-15.5)  g/dL


 


Hct  31.5 L    (34.0-46.0)  %


 


MCV  83.3 L    (84.0-98.0)  fL


 


MCH  28.6    (28.2-33.3)  pg


 


MCHC  34.3    (31.7-36.0)  g/dL


 


RDW  13.5    (11.2-14.1)  %


 


Plt Count  194    (150-350)  K/uL


 


Neut % (Auto)  56.5    (45.0-80.0)  %


 


Lymph % (Auto)  35.6    (10.0-50.0)  %


 


Mono % (Auto)  5.1    (2.0-14.0)  %


 


Eos % (Auto)  2.5    (0.0-5.0)  %


 


Baso % (Auto)  0.3    (0.0-2.0)  %


 


Neut # (Auto)  6.06    (1.40-7.00)  K/uL


 


Lymph # (Auto)  3.82 H    (0.50-3.50)  K/uL


 


Mono # (Auto)  0.55    (0.00-1.00)  K/uL


 


Eos # (Auto)  0.27    (0.00-0.50)  K/uL


 


Baso # (Auto)  0.03    (0.00-0.20)  K/uL


 


Sodium     (136-145)  mmol/L


 


Potassium     (3.5-5.1)  mmol/L


 


Chloride     ()  mmol/L


 


Carbon Dioxide     (21.0-32.0)  mmol/L


 


BUN     (7-18)  mg/dL


 


Creatinine     (0.51-1.17)  mg/dL


 


Est Cr Clr Drug Dosing     mL/min


 


Estimated GFR (MDRD)     mL/min


 


Glucose     ()  mg/dL


 


POC Glucose    337 H*  ()  mg/dl


 


Hemoglobin A1c   12.5 H   (4.3-5.7)  %


 


Calcium     (8.5-10.1)  mg/dL


 


Magnesium     (1.8-2.4)  mg/dL


 


Iron     ()  ug/dL


 


TIBC     (250-450)  ug/dL


 


% Saturation     


 


Ferritin     (8-388)  ng/mL


 


Total Bilirubin     (0.2-1.0)  mg/dL


 


AST     (15-37)  U/L


 


ALT     (12-78)  U/L


 


Alkaline Phosphatase     ()  IU/L


 


Creatine Kinase     ()  U/L


 


Creatine Kinase Index     (0.0-2.5)  %


 


CK-MB (CK-2)     (0.00-3.60)  ng/mL


 


Troponin I     (0.000-0.056)  ng/mL


 


NT-Pro-B Natriuret Pep     (0-125)  pg/mL


 


Total Protein     (6.4-8.2)  g/dL


 


Albumin     (3.4-5.0)  g/dL


 


Triglycerides     ()  mg/dL


 


Cholesterol     (100-200)  mg/dL


 


HDL Cholesterol     (40-60)  mg/dL


 


Vitamin B12     (193-986)  pg/mL


 


Folate     (8.6-58.9)  ng/mL


 


Specimen Type     


 


Urine Color     


 


Urine Appearance     


 


Urine pH     (5.0-9.0)  


 


Ur Specific Gravity     (1.005-1.030)  


 


Urine Protein     (NEGATIVE)  mg/dL


 


Urine Glucose (UA)     (NEGATIVE)  mg/dL


 


Urine Ketones     (NEGATIVE)  mg/dL


 


Urine Occult Blood     (NEGATIVE)  


 


Urine Nitrite     (NEGATIVE)  


 


Urine Bilirubin     (NEGATIVE)  


 


Urine Urobilinogen     (0.2-1.0)  E.U./dL


 


Ur Leukocyte Esterase     (NEGATIVE)  


 


Urine RBC     /HPF


 


Urine WBC     /HPF


 


Ur Epithelial Cells     /LPF


 


Urine Bacteria     (NONE TO FEW)  /HPF


 


Urinalysis Comment     














  06/15/19 06/15/19 06/16/19 Range/Units





  17:06 20:51 07:06 


 


WBC     (4.0-10.2)  K/uL


 


RBC     (3.77-5.09)  M/uL


 


Hgb     (11.7-15.5)  g/dL


 


Hct     (34.0-46.0)  %


 


MCV     (84.0-98.0)  fL


 


MCH     (28.2-33.3)  pg


 


MCHC     (31.7-36.0)  g/dL


 


RDW     (11.2-14.1)  %


 


Plt Count     (150-350)  K/uL


 


Neut % (Auto)     (45.0-80.0)  %


 


Lymph % (Auto)     (10.0-50.0)  %


 


Mono % (Auto)     (2.0-14.0)  %


 


Eos % (Auto)     (0.0-5.0)  %


 


Baso % (Auto)     (0.0-2.0)  %


 


Neut # (Auto)     (1.40-7.00)  K/uL


 


Lymph # (Auto)     (0.50-3.50)  K/uL


 


Mono # (Auto)     (0.00-1.00)  K/uL


 


Eos # (Auto)     (0.00-0.50)  K/uL


 


Baso # (Auto)     (0.00-0.20)  K/uL


 


Sodium    138  (136-145)  mmol/L


 


Potassium    5.1  (3.5-5.1)  mmol/L


 


Chloride    107  ()  mmol/L


 


Carbon Dioxide    24.2  (21.0-32.0)  mmol/L


 


BUN    18  (7-18)  mg/dL


 


Creatinine    0.75  (0.51-1.17)  mg/dL


 


Est Cr Clr Drug Dosing    53.61  mL/min


 


Estimated GFR (MDRD)    > 60  mL/min


 


Glucose    235 H  ()  mg/dL


 


POC Glucose  233 H  273 H*   ()  mg/dl


 


Hemoglobin A1c     (4.3-5.7)  %


 


Calcium    8.0 L  (8.5-10.1)  mg/dL


 


Magnesium    1.9  (1.8-2.4)  mg/dL


 


Iron     ()  ug/dL


 


TIBC     (250-450)  ug/dL


 


% Saturation     


 


Ferritin     (8-388)  ng/mL


 


Total Bilirubin     (0.2-1.0)  mg/dL


 


AST     (15-37)  U/L


 


ALT     (12-78)  U/L


 


Alkaline Phosphatase     ()  IU/L


 


Creatine Kinase     ()  U/L


 


Creatine Kinase Index     (0.0-2.5)  %


 


CK-MB (CK-2)     (0.00-3.60)  ng/mL


 


Troponin I     (0.000-0.056)  ng/mL


 


NT-Pro-B Natriuret Pep     (0-125)  pg/mL


 


Total Protein     (6.4-8.2)  g/dL


 


Albumin     (3.4-5.0)  g/dL


 


Triglycerides    1032 H  ()  mg/dL


 


Cholesterol    348 H  (100-200)  mg/dL


 


HDL Cholesterol    41  (40-60)  mg/dL


 


Vitamin B12     (193-986)  pg/mL


 


Folate     (8.6-58.9)  ng/mL


 


Specimen Type     


 


Urine Color     


 


Urine Appearance     


 


Urine pH     (5.0-9.0)  


 


Ur Specific Gravity     (1.005-1.030)  


 


Urine Protein     (NEGATIVE)  mg/dL


 


Urine Glucose (UA)     (NEGATIVE)  mg/dL


 


Urine Ketones     (NEGATIVE)  mg/dL


 


Urine Occult Blood     (NEGATIVE)  


 


Urine Nitrite     (NEGATIVE)  


 


Urine Bilirubin     (NEGATIVE)  


 


Urine Urobilinogen     (0.2-1.0)  E.U./dL


 


Ur Leukocyte Esterase     (NEGATIVE)  


 


Urine RBC     /HPF


 


Urine WBC     /HPF


 


Ur Epithelial Cells     /LPF


 


Urine Bacteria     (NONE TO FEW)  /HPF


 


Urinalysis Comment     














  06/16/19 06/16/19 06/16/19 Range/Units





  07:06 07:17 11:24 


 


WBC  9.9    (4.0-10.2)  K/uL


 


RBC  3.89    (3.77-5.09)  M/uL


 


Hgb  10.8 L    (11.7-15.5)  g/dL


 


Hct  33.1 L    (34.0-46.0)  %


 


MCV  85.1    (84.0-98.0)  fL


 


MCH  27.8 L    (28.2-33.3)  pg


 


MCHC  32.6    (31.7-36.0)  g/dL


 


RDW  13.6    (11.2-14.1)  %


 


Plt Count  174    (150-350)  K/uL


 


Neut % (Auto)  54.0    (45.0-80.0)  %


 


Lymph % (Auto)  37.6    (10.0-50.0)  %


 


Mono % (Auto)  4.6    (2.0-14.0)  %


 


Eos % (Auto)  3.3    (0.0-5.0)  %


 


Baso % (Auto)  0.5    (0.0-2.0)  %


 


Neut # (Auto)  5.34    (1.40-7.00)  K/uL


 


Lymph # (Auto)  3.71 H    (0.50-3.50)  K/uL


 


Mono # (Auto)  0.45    (0.00-1.00)  K/uL


 


Eos # (Auto)  0.33    (0.00-0.50)  K/uL


 


Baso # (Auto)  0.05    (0.00-0.20)  K/uL


 


Sodium     (136-145)  mmol/L


 


Potassium     (3.5-5.1)  mmol/L


 


Chloride     ()  mmol/L


 


Carbon Dioxide     (21.0-32.0)  mmol/L


 


BUN     (7-18)  mg/dL


 


Creatinine     (0.51-1.17)  mg/dL


 


Est Cr Clr Drug Dosing     mL/min


 


Estimated GFR (MDRD)     mL/min


 


Glucose     ()  mg/dL


 


POC Glucose   221 H  252 H*  ()  mg/dl


 


Hemoglobin A1c     (4.3-5.7)  %


 


Calcium     (8.5-10.1)  mg/dL


 


Magnesium     (1.8-2.4)  mg/dL


 


Iron     ()  ug/dL


 


TIBC     (250-450)  ug/dL


 


% Saturation     


 


Ferritin     (8-388)  ng/mL


 


Total Bilirubin     (0.2-1.0)  mg/dL


 


AST     (15-37)  U/L


 


ALT     (12-78)  U/L


 


Alkaline Phosphatase     ()  IU/L


 


Creatine Kinase     ()  U/L


 


Creatine Kinase Index     (0.0-2.5)  %


 


CK-MB (CK-2)     (0.00-3.60)  ng/mL


 


Troponin I     (0.000-0.056)  ng/mL


 


NT-Pro-B Natriuret Pep     (0-125)  pg/mL


 


Total Protein     (6.4-8.2)  g/dL


 


Albumin     (3.4-5.0)  g/dL


 


Triglycerides     ()  mg/dL


 


Cholesterol     (100-200)  mg/dL


 


HDL Cholesterol     (40-60)  mg/dL


 


Vitamin B12     (193-986)  pg/mL


 


Folate     (8.6-58.9)  ng/mL


 


Specimen Type     


 


Urine Color     


 


Urine Appearance     


 


Urine pH     (5.0-9.0)  


 


Ur Specific Gravity     (1.005-1.030)  


 


Urine Protein     (NEGATIVE)  mg/dL


 


Urine Glucose (UA)     (NEGATIVE)  mg/dL


 


Urine Ketones     (NEGATIVE)  mg/dL


 


Urine Occult Blood     (NEGATIVE)  


 


Urine Nitrite     (NEGATIVE)  


 


Urine Bilirubin     (NEGATIVE)  


 


Urine Urobilinogen     (0.2-1.0)  E.U./dL


 


Ur Leukocyte Esterase     (NEGATIVE)  


 


Urine RBC     /HPF


 


Urine WBC     /HPF


 


Ur Epithelial Cells     /LPF


 


Urine Bacteria     (NONE TO FEW)  /HPF


 


Urinalysis Comment     














  06/16/19 06/17/19 06/17/19 Range/Units





  17:03 03:37 06:40 


 


WBC    9.7  (4.0-10.2)  K/uL


 


RBC    3.87  (3.77-5.09)  M/uL


 


Hgb    10.8 L  (11.7-15.5)  g/dL


 


Hct    32.9 L  (34.0-46.0)  %


 


MCV    85.0  (84.0-98.0)  fL


 


MCH    27.9 L  (28.2-33.3)  pg


 


MCHC    32.8  (31.7-36.0)  g/dL


 


RDW    13.4  (11.2-14.1)  %


 


Plt Count    169  (150-350)  K/uL


 


Neut % (Auto)    54.5  (45.0-80.0)  %


 


Lymph % (Auto)    36.6  (10.0-50.0)  %


 


Mono % (Auto)    5.3  (2.0-14.0)  %


 


Eos % (Auto)    3.2  (0.0-5.0)  %


 


Baso % (Auto)    0.4  (0.0-2.0)  %


 


Neut # (Auto)    5.26  (1.40-7.00)  K/uL


 


Lymph # (Auto)    3.54 H  (0.50-3.50)  K/uL


 


Mono # (Auto)    0.51  (0.00-1.00)  K/uL


 


Eos # (Auto)    0.31  (0.00-0.50)  K/uL


 


Baso # (Auto)    0.04  (0.00-0.20)  K/uL


 


Sodium     (136-145)  mmol/L


 


Potassium     (3.5-5.1)  mmol/L


 


Chloride     ()  mmol/L


 


Carbon Dioxide     (21.0-32.0)  mmol/L


 


BUN     (7-18)  mg/dL


 


Creatinine     (0.51-1.17)  mg/dL


 


Est Cr Clr Drug Dosing     mL/min


 


Estimated GFR (MDRD)     mL/min


 


Glucose     ()  mg/dL


 


POC Glucose  199 H  195 H   ()  mg/dl


 


Hemoglobin A1c     (4.3-5.7)  %


 


Calcium     (8.5-10.1)  mg/dL


 


Magnesium     (1.8-2.4)  mg/dL


 


Iron     ()  ug/dL


 


TIBC     (250-450)  ug/dL


 


% Saturation     


 


Ferritin     (8-388)  ng/mL


 


Total Bilirubin     (0.2-1.0)  mg/dL


 


AST     (15-37)  U/L


 


ALT     (12-78)  U/L


 


Alkaline Phosphatase     ()  IU/L


 


Creatine Kinase     ()  U/L


 


Creatine Kinase Index     (0.0-2.5)  %


 


CK-MB (CK-2)     (0.00-3.60)  ng/mL


 


Troponin I     (0.000-0.056)  ng/mL


 


NT-Pro-B Natriuret Pep     (0-125)  pg/mL


 


Total Protein     (6.4-8.2)  g/dL


 


Albumin     (3.4-5.0)  g/dL


 


Triglycerides     ()  mg/dL


 


Cholesterol     (100-200)  mg/dL


 


HDL Cholesterol     (40-60)  mg/dL


 


Vitamin B12     (193-986)  pg/mL


 


Folate     (8.6-58.9)  ng/mL


 


Specimen Type     


 


Urine Color     


 


Urine Appearance     


 


Urine pH     (5.0-9.0)  


 


Ur Specific Gravity     (1.005-1.030)  


 


Urine Protein     (NEGATIVE)  mg/dL


 


Urine Glucose (UA)     (NEGATIVE)  mg/dL


 


Urine Ketones     (NEGATIVE)  mg/dL


 


Urine Occult Blood     (NEGATIVE)  


 


Urine Nitrite     (NEGATIVE)  


 


Urine Bilirubin     (NEGATIVE)  


 


Urine Urobilinogen     (0.2-1.0)  E.U./dL


 


Ur Leukocyte Esterase     (NEGATIVE)  


 


Urine RBC     /HPF


 


Urine WBC     /HPF


 


Ur Epithelial Cells     /LPF


 


Urine Bacteria     (NONE TO FEW)  /HPF


 


Urinalysis Comment     














  06/17/19 06/17/19 06/17/19 Range/Units





  06:40 06:40 07:19 


 


WBC     (4.0-10.2)  K/uL


 


RBC     (3.77-5.09)  M/uL


 


Hgb     (11.7-15.5)  g/dL


 


Hct     (34.0-46.0)  %


 


MCV     (84.0-98.0)  fL


 


MCH     (28.2-33.3)  pg


 


MCHC     (31.7-36.0)  g/dL


 


RDW     (11.2-14.1)  %


 


Plt Count     (150-350)  K/uL


 


Neut % (Auto)     (45.0-80.0)  %


 


Lymph % (Auto)     (10.0-50.0)  %


 


Mono % (Auto)     (2.0-14.0)  %


 


Eos % (Auto)     (0.0-5.0)  %


 


Baso % (Auto)     (0.0-2.0)  %


 


Neut # (Auto)     (1.40-7.00)  K/uL


 


Lymph # (Auto)     (0.50-3.50)  K/uL


 


Mono # (Auto)     (0.00-1.00)  K/uL


 


Eos # (Auto)     (0.00-0.50)  K/uL


 


Baso # (Auto)     (0.00-0.20)  K/uL


 


Sodium  136    (136-145)  mmol/L


 


Potassium  5.0    (3.5-5.1)  mmol/L


 


Chloride  104    ()  mmol/L


 


Carbon Dioxide  23.7    (21.0-32.0)  mmol/L


 


BUN  18    (7-18)  mg/dL


 


Creatinine  0.76    (0.51-1.17)  mg/dL


 


Est Cr Clr Drug Dosing  52.89    mL/min


 


Estimated GFR (MDRD)  > 60    mL/min


 


Glucose  227 H    ()  mg/dL


 


POC Glucose    216 H  ()  mg/dl


 


Hemoglobin A1c     (4.3-5.7)  %


 


Calcium  8.5    (8.5-10.1)  mg/dL


 


Magnesium  1.5 L    (1.8-2.4)  mg/dL


 


Iron   37 L   ()  ug/dL


 


TIBC   167 L   (250-450)  ug/dL


 


% Saturation   22.23543   


 


Ferritin   216   (8-388)  ng/mL


 


Total Bilirubin     (0.2-1.0)  mg/dL


 


AST     (15-37)  U/L


 


ALT     (12-78)  U/L


 


Alkaline Phosphatase     ()  IU/L


 


Creatine Kinase  39    ()  U/L


 


Creatine Kinase Index  4.1 H    (0.0-2.5)  %


 


CK-MB (CK-2)  1.60    (0.00-3.60)  ng/mL


 


Troponin I  0.000    (0.000-0.056)  ng/mL


 


NT-Pro-B Natriuret Pep  957 H    (0-125)  pg/mL


 


Total Protein     (6.4-8.2)  g/dL


 


Albumin     (3.4-5.0)  g/dL


 


Triglycerides     ()  mg/dL


 


Cholesterol     (100-200)  mg/dL


 


HDL Cholesterol     (40-60)  mg/dL


 


Vitamin B12  313    (193-986)  pg/mL


 


Folate  18.8    (8.6-58.9)  ng/mL


 


Specimen Type     


 


Urine Color     


 


Urine Appearance     


 


Urine pH     (5.0-9.0)  


 


Ur Specific Gravity     (1.005-1.030)  


 


Urine Protein     (NEGATIVE)  mg/dL


 


Urine Glucose (UA)     (NEGATIVE)  mg/dL


 


Urine Ketones     (NEGATIVE)  mg/dL


 


Urine Occult Blood     (NEGATIVE)  


 


Urine Nitrite     (NEGATIVE)  


 


Urine Bilirubin     (NEGATIVE)  


 


Urine Urobilinogen     (0.2-1.0)  E.U./dL


 


Ur Leukocyte Esterase     (NEGATIVE)  


 


Urine RBC     /HPF


 


Urine WBC     /HPF


 


Ur Epithelial Cells     /LPF


 


Urine Bacteria     (NONE TO FEW)  /HPF


 


Urinalysis Comment     














  06/17/19 06/17/19 06/17/19 Range/Units





  11:05 17:10 20:14 


 


WBC     (4.0-10.2)  K/uL


 


RBC     (3.77-5.09)  M/uL


 


Hgb     (11.7-15.5)  g/dL


 


Hct     (34.0-46.0)  %


 


MCV     (84.0-98.0)  fL


 


MCH     (28.2-33.3)  pg


 


MCHC     (31.7-36.0)  g/dL


 


RDW     (11.2-14.1)  %


 


Plt Count     (150-350)  K/uL


 


Neut % (Auto)     (45.0-80.0)  %


 


Lymph % (Auto)     (10.0-50.0)  %


 


Mono % (Auto)     (2.0-14.0)  %


 


Eos % (Auto)     (0.0-5.0)  %


 


Baso % (Auto)     (0.0-2.0)  %


 


Neut # (Auto)     (1.40-7.00)  K/uL


 


Lymph # (Auto)     (0.50-3.50)  K/uL


 


Mono # (Auto)     (0.00-1.00)  K/uL


 


Eos # (Auto)     (0.00-0.50)  K/uL


 


Baso # (Auto)     (0.00-0.20)  K/uL


 


Sodium     (136-145)  mmol/L


 


Potassium     (3.5-5.1)  mmol/L


 


Chloride     ()  mmol/L


 


Carbon Dioxide     (21.0-32.0)  mmol/L


 


BUN     (7-18)  mg/dL


 


Creatinine     (0.51-1.17)  mg/dL


 


Est Cr Clr Drug Dosing     mL/min


 


Estimated GFR (MDRD)     mL/min


 


Glucose     ()  mg/dL


 


POC Glucose  238 H  122 H  261 H*  ()  mg/dl


 


Hemoglobin A1c     (4.3-5.7)  %


 


Calcium     (8.5-10.1)  mg/dL


 


Magnesium     (1.8-2.4)  mg/dL


 


Iron     ()  ug/dL


 


TIBC     (250-450)  ug/dL


 


% Saturation     


 


Ferritin     (8-388)  ng/mL


 


Total Bilirubin     (0.2-1.0)  mg/dL


 


AST     (15-37)  U/L


 


ALT     (12-78)  U/L


 


Alkaline Phosphatase     ()  IU/L


 


Creatine Kinase     ()  U/L


 


Creatine Kinase Index     (0.0-2.5)  %


 


CK-MB (CK-2)     (0.00-3.60)  ng/mL


 


Troponin I     (0.000-0.056)  ng/mL


 


NT-Pro-B Natriuret Pep     (0-125)  pg/mL


 


Total Protein     (6.4-8.2)  g/dL


 


Albumin     (3.4-5.0)  g/dL


 


Triglycerides     ()  mg/dL


 


Cholesterol     (100-200)  mg/dL


 


HDL Cholesterol     (40-60)  mg/dL


 


Vitamin B12     (193-986)  pg/mL


 


Folate     (8.6-58.9)  ng/mL


 


Specimen Type     


 


Urine Color     


 


Urine Appearance     


 


Urine pH     (5.0-9.0)  


 


Ur Specific Gravity     (1.005-1.030)  


 


Urine Protein     (NEGATIVE)  mg/dL


 


Urine Glucose (UA)     (NEGATIVE)  mg/dL


 


Urine Ketones     (NEGATIVE)  mg/dL


 


Urine Occult Blood     (NEGATIVE)  


 


Urine Nitrite     (NEGATIVE)  


 


Urine Bilirubin     (NEGATIVE)  


 


Urine Urobilinogen     (0.2-1.0)  E.U./dL


 


Ur Leukocyte Esterase     (NEGATIVE)  


 


Urine RBC     /HPF


 


Urine WBC     /HPF


 


Ur Epithelial Cells     /LPF


 


Urine Bacteria     (NONE TO FEW)  /HPF


 


Urinalysis Comment     














  06/17/19 06/18/19 06/18/19 Range/Units





  21:19 06:55 06:55 


 


WBC   11.2 H   (4.0-10.2)  K/uL


 


RBC   3.80   (3.77-5.09)  M/uL


 


Hgb   10.5 L   (11.7-15.5)  g/dL


 


Hct   31.8 L   (34.0-46.0)  %


 


MCV   83.7 L   (84.0-98.0)  fL


 


MCH   27.6 L   (28.2-33.3)  pg


 


MCHC   33.0   (31.7-36.0)  g/dL


 


RDW   13.7   (11.2-14.1)  %


 


Plt Count   180   (150-350)  K/uL


 


Neut % (Auto)   60.3   (45.0-80.0)  %


 


Lymph % (Auto)   31.3   (10.0-50.0)  %


 


Mono % (Auto)   5.5   (2.0-14.0)  %


 


Eos % (Auto)   2.6   (0.0-5.0)  %


 


Baso % (Auto)   0.3   (0.0-2.0)  %


 


Neut # (Auto)   6.74   (1.40-7.00)  K/uL


 


Lymph # (Auto)   3.49   (0.50-3.50)  K/uL


 


Mono # (Auto)   0.61   (0.00-1.00)  K/uL


 


Eos # (Auto)   0.29   (0.00-0.50)  K/uL


 


Baso # (Auto)   0.03   (0.00-0.20)  K/uL


 


Sodium    136  (136-145)  mmol/L


 


Potassium    5.2 H  (3.5-5.1)  mmol/L


 


Chloride    103  ()  mmol/L


 


Carbon Dioxide    24.5  (21.0-32.0)  mmol/L


 


BUN    23 H  (7-18)  mg/dL


 


Creatinine    0.88  (0.51-1.17)  mg/dL


 


Est Cr Clr Drug Dosing    45.68  mL/min


 


Estimated GFR (MDRD)    > 60  mL/min


 


Glucose    220 H  ()  mg/dL


 


POC Glucose  227 H    ()  mg/dl


 


Hemoglobin A1c     (4.3-5.7)  %


 


Calcium    9.3  (8.5-10.1)  mg/dL


 


Magnesium    1.3 L  (1.8-2.4)  mg/dL


 


Iron     ()  ug/dL


 


TIBC     (250-450)  ug/dL


 


% Saturation     


 


Ferritin     (8-388)  ng/mL


 


Total Bilirubin     (0.2-1.0)  mg/dL


 


AST     (15-37)  U/L


 


ALT     (12-78)  U/L


 


Alkaline Phosphatase     ()  IU/L


 


Creatine Kinase     ()  U/L


 


Creatine Kinase Index     (0.0-2.5)  %


 


CK-MB (CK-2)     (0.00-3.60)  ng/mL


 


Troponin I     (0.000-0.056)  ng/mL


 


NT-Pro-B Natriuret Pep    448 H  (0-125)  pg/mL


 


Total Protein     (6.4-8.2)  g/dL


 


Albumin     (3.4-5.0)  g/dL


 


Triglycerides     ()  mg/dL


 


Cholesterol     (100-200)  mg/dL


 


HDL Cholesterol     (40-60)  mg/dL


 


Vitamin B12     (193-986)  pg/mL


 


Folate     (8.6-58.9)  ng/mL


 


Specimen Type     


 


Urine Color     


 


Urine Appearance     


 


Urine pH     (5.0-9.0)  


 


Ur Specific Gravity     (1.005-1.030)  


 


Urine Protein     (NEGATIVE)  mg/dL


 


Urine Glucose (UA)     (NEGATIVE)  mg/dL


 


Urine Ketones     (NEGATIVE)  mg/dL


 


Urine Occult Blood     (NEGATIVE)  


 


Urine Nitrite     (NEGATIVE)  


 


Urine Bilirubin     (NEGATIVE)  


 


Urine Urobilinogen     (0.2-1.0)  E.U./dL


 


Ur Leukocyte Esterase     (NEGATIVE)  


 


Urine RBC     /HPF


 


Urine WBC     /HPF


 


Ur Epithelial Cells     /LPF


 


Urine Bacteria     (NONE TO FEW)  /HPF


 


Urinalysis Comment     














  06/18/19 Range/Units





  06:55 


 


WBC   (4.0-10.2)  K/uL


 


RBC   (3.77-5.09)  M/uL


 


Hgb   (11.7-15.5)  g/dL


 


Hct   (34.0-46.0)  %


 


MCV   (84.0-98.0)  fL


 


MCH   (28.2-33.3)  pg


 


MCHC   (31.7-36.0)  g/dL


 


RDW   (11.2-14.1)  %


 


Plt Count   (150-350)  K/uL


 


Neut % (Auto)   (45.0-80.0)  %


 


Lymph % (Auto)   (10.0-50.0)  %


 


Mono % (Auto)   (2.0-14.0)  %


 


Eos % (Auto)   (0.0-5.0)  %


 


Baso % (Auto)   (0.0-2.0)  %


 


Neut # (Auto)   (1.40-7.00)  K/uL


 


Lymph # (Auto)   (0.50-3.50)  K/uL


 


Mono # (Auto)   (0.00-1.00)  K/uL


 


Eos # (Auto)   (0.00-0.50)  K/uL


 


Baso # (Auto)   (0.00-0.20)  K/uL


 


Sodium   (136-145)  mmol/L


 


Potassium   (3.5-5.1)  mmol/L


 


Chloride   ()  mmol/L


 


Carbon Dioxide   (21.0-32.0)  mmol/L


 


BUN   (7-18)  mg/dL


 


Creatinine   (0.51-1.17)  mg/dL


 


Est Cr Clr Drug Dosing   mL/min


 


Estimated GFR (MDRD)   mL/min


 


Glucose   ()  mg/dL


 


POC Glucose  208 H  ()  mg/dl


 


Hemoglobin A1c   (4.3-5.7)  %


 


Calcium   (8.5-10.1)  mg/dL


 


Magnesium   (1.8-2.4)  mg/dL


 


Iron   ()  ug/dL


 


TIBC   (250-450)  ug/dL


 


% Saturation   


 


Ferritin   (8-388)  ng/mL


 


Total Bilirubin   (0.2-1.0)  mg/dL


 


AST   (15-37)  U/L


 


ALT   (12-78)  U/L


 


Alkaline Phosphatase   ()  IU/L


 


Creatine Kinase   ()  U/L


 


Creatine Kinase Index   (0.0-2.5)  %


 


CK-MB (CK-2)   (0.00-3.60)  ng/mL


 


Troponin I   (0.000-0.056)  ng/mL


 


NT-Pro-B Natriuret Pep   (0-125)  pg/mL


 


Total Protein   (6.4-8.2)  g/dL


 


Albumin   (3.4-5.0)  g/dL


 


Triglycerides   ()  mg/dL


 


Cholesterol   (100-200)  mg/dL


 


HDL Cholesterol   (40-60)  mg/dL


 


Vitamin B12   (193-986)  pg/mL


 


Folate   (8.6-58.9)  ng/mL


 


Specimen Type   


 


Urine Color   


 


Urine Appearance   


 


Urine pH   (5.0-9.0)  


 


Ur Specific Gravity   (1.005-1.030)  


 


Urine Protein   (NEGATIVE)  mg/dL


 


Urine Glucose (UA)   (NEGATIVE)  mg/dL


 


Urine Ketones   (NEGATIVE)  mg/dL


 


Urine Occult Blood   (NEGATIVE)  


 


Urine Nitrite   (NEGATIVE)  


 


Urine Bilirubin   (NEGATIVE)  


 


Urine Urobilinogen   (0.2-1.0)  E.U./dL


 


Ur Leukocyte Esterase   (NEGATIVE)  


 


Urine RBC   /HPF


 


Urine WBC   /HPF


 


Ur Epithelial Cells   /LPF


 


Urine Bacteria   (NONE TO FEW)  /HPF


 


Urinalysis Comment   











[facility] Results - Last 24 hrs: 





None


Med Orders - Current: 


 Current Medications





Acetaminophen (Tylenol Extra Strength)  1,000 mg PO Q6H PRN


   PRN Reason: Pain/Fever


   Last Admin: 06/16/19 23:10 Dose:  1,000 mg


Amlodipine Besylate (Norvasc)  10 mg PO DAILY [facility]


   Last Admin: 06/18/19 08:06 Dose:  10 mg


Aspirin (Halfprin)  81 mg PO DAILY [facility]


   Last Admin: 06/18/19 08:05 Dose:  81 mg


Calcium Carbonate (Oystcal-D 625 Mg-125 Units)  1 tab PO DAILY [facility]


   Last Admin: 06/18/19 08:07 Dose:  1 tab


Carvedilol (Coreg)  25 mg PO BIDAC [facility]


   Last Admin: 06/18/19 08:08 Dose:  25 mg


Citalopram Hydrobromide (Celexa)  20 mg PO DAILY [facility]


   Last Admin: 06/18/19 08:06 Dose:  20 mg


Clonidine HCl (Catapres)  0.1 mg PO QPM [facility]


   Last Admin: 06/17/19 17:36 Dose:  0.1 mg


Coenzyme Q10 (Coenzyme Q10)  100 mg PO TID [facility]


   Last Admin: 06/18/19 08:08 Dose:  100 mg


Enalapril Maleate (Vasotec)  20 mg PO BID [facility]


   Last Admin: 06/18/19 08:04 Dose:  20 mg


Enoxaparin Sodium (Lovenox)  40 mg SUBCUT Q24H [facility]


   Last Admin: 06/17/19 14:48 Dose:  40 mg


Ferrous Sulfate (Ferrous Sulfate)  325 mg PO BID [facility]


   Last Admin: 06/18/19 08:08 Dose:  325 mg


Furosemide (Lasix)  40 mg PO DAILY [facility]


   Last Admin: 06/18/19 08:08 Dose:  40 mg


Guaifenesin (Mucinex)  600 mg PO BID [facility]


   Last Admin: 06/18/19 08:05 Dose:  600 mg


Insulin Glargine (Lantus)  22 unit SUBCUT BID [facility]


   Last Admin: 06/18/19 08:12 Dose:  22 unit


Insulin Human Regular (Humulin R)  0 unit SUBCUT TIDAC [facility]; Protocol


   Last Admin: 06/18/19 08:10 Dose:  6 units


Isosorbide Mononitrate (Imdur)  30 mg PO QPM [facility]


   Last Admin: 06/17/19 17:39 Dose:  30 mg


Lovastatin (Mevacor)  20 mg PO DAILY@1700 [facility]


   Last Admin: 06/17/19 17:37 Dose:  20 mg


Magnesium Oxide (Magnesium Oxide)  400 mg PO BID [facility]


   Last Admin: 06/18/19 08:08 Dose:  400 mg


Omeprazole (Omeprazole)  20 mg PO DAILY [facility]


   Last Admin: 06/18/19 08:07 Dose:  20 mg


Senna/Docusate Sodium (Senna Plus)  1 tab PO BID [facility]


   Last Admin: 06/18/19 08:05 Dose:  1 tab


Sodium Chloride (Saline Flush)  10 ml FLUSH ASDIRECTED PRN


   PRN Reason: Keep Vein Open


   Last Admin: 06/17/19 10:24 Dose:  10 ml


Spironolactone (Aldactone)  25 mg PO BID [facility]


   Last Admin: 06/18/19 08:07 Dose:  25 mg


Temazepam (Restoril)  15 mg PO BEDTIME PRN


   PRN Reason: Insomnia


   Last Admin: 06/17/19 21:33 Dose:  15 mg





Discontinued Medications





Acetaminophen (Tylenol Extra Strength)  1,000 mg PO Q4HR [facility]


   Last Admin: 06/14/19 21:23 Dose:  Not Given


Acetaminophen (Tylenol Extra Strength)  1,000 mg PO Q6H [facility]


   Last Admin: 06/14/19 21:23 Dose:  Not Given


Acetaminophen/Codeine Phosphate (Tylenol With Codeine No.3 300mg/30mg)  1 tab 

PO BID PRN


   PRN Reason: Pain


Clonidine HCl (Catapres)  0.1 mg PO BID [facility]


   Last Admin: 06/16/19 07:46 Dose:  0.1 mg


Famotidine (Pepcid)  20 mg PO BID PRN


   PRN Reason: INDIGETION


Ferrous Sulfate (Ferrous Sulfate)  325 mg PO DAILY [facility]


   Last Admin: 06/17/19 09:06 Dose:  325 mg


Fish Oil (Fish Oil)  1 gm PO DAILY [facility]


   Last Admin: 06/16/19 07:47 Dose:  1 gm


Furosemide (Lasix)  20 mg PO DAILY [facility]


   Last Admin: 06/17/19 09:00 Dose:  20 mg


Furosemide (Lasix)  20 mg IVPUSH ONETIME ONE


   Stop: 06/17/19 09:43


   Last Admin: 06/17/19 10:23 Dose:  20 mg


Sodium Chloride (Sodium Chloride 3%)  500 mls @ 15 mls/hr IV ASDIRECTED [facility]


   Stop: 06/16/19 00:43


   Last Infusion: 06/15/19 17:56 Dose:  15 mls/hr


Sodium Chloride (Normal Saline)  1,000 mls @ 100 mls/hr IV ASDIRECTED [facility]


   Last Admin: 06/16/19 02:17 Dose:  100 mls/hr


Insulin Glargine (Lantus)  30 unit SUBCUT BEDTIME [facility]


   Last Admin: 06/15/19 20:52 Dose:  30 units


Insulin Glargine (Lantus)  20 unit SUBCUT BID [facility]


   Last Admin: 06/17/19 09:06 Dose:  20 units


Insulin Human Regular [facility] R[facility]  [facility]


   Last Admin: 06/15/19 07:26 Dose:  12 units


Insulin Human Regular (Humulin R)  10 unit SUBCUT ONETIME ONE


   Stop: 06/15/19 19:54


Insulin Human Regular (Humulin R)  10 unit SUBCUT ONETIME ONE


   Stop: 06/14/19 20:28


   Last Admin: 06/14/19 21:37 Dose:  10 unit


Magnesium Citrate (Citrate Of Magnesia)  300 ml PO ONETIME ONE


   Stop: 06/14/19 21:09


   Last Admin: 06/14/19 21:39 Dose:  296 ml


Magnesium Oxide (Magnesium Oxide)  400 mg PO DAILY [facility]


   Last Admin: 06/17/19 09:00 Dose:  400 mg


Magnesium Sulfate/Dextrose (Magnesium Sulfate In D5w 100 Premix)  1 gm IV 

ONETIME ONE


   Stop: 06/14/19 18:13


   Last Admin: 06/14/19 18:59 Dose:  1 gm


Magnesium Sulfate/Dextrose (Magnesium Sulfate In D5w 100 Premix)  1 gm IV 

ONETIME ONE


   Stop: 06/14/19 22:01


Magnesium Sulfate/Dextrose (Magnesium Sulfate In D5w 100 Premix)  1 gm IV 

ONETIME ONE


   Stop: 06/14/19 23:01


   Last Admin: 06/14/19 23:16 Dose:  1 gm


Meclizine HCl (Antivert)  25 mg PO Q4HR PRN


   PRN Reason: Dizziness


Metolazone (Zaroxolyn)  2.5 mg PO MOTH@08 [facility]


Quetiapine Fumarate (Seroquel)  12.5 mg PO DAILY [facility]


   Last Admin: 06/15/19 07:23 Dose:  12.5 mg


Quetiapine Fumarate (Seroquel)  12.5 mg PO BEDTIME [facility]


Sodium Chloride (Sodium Chloride)  1 gm PO DAILY [facility]


   Last Admin: 06/16/19 07:46 Dose:  1 gm


Spironolactone (Aldactone)  12.5 mg PO DAILY [facility]


   Last Admin: 06/16/19 07:47 Dose:  12.5 mg


Spironolactone (Aldactone)  12.5 mg PO BID [facility]


   Last Admin: 06/17/19 09:01 Dose:  12.5 mg


Spironolactone (Aldactone)  12.5 mg PO ONETIME ONE


   Stop: 06/17/19 09:50


   Last Admin: 06/17/19 10:22 Dose:  12.5 mg











- Exam


Quality Assessment: Reports: DVT Prophylaxis.  Denies: Supplemental Oxygen, 

Central Line/PICC, Urine Catheter, Skin Breakdown


General: Reports: Alert, Cooperative, No Acute Distress.  Denies: Oriented (

Stable mild confusion)


HEENT: Reports: Pupils Equal, Pupils Reactive, EOMI, Mucous Membr. Moist/Pink, 

Other (Patient wearing glasses).  Denies: Scleral Icterus


Neck: Reports: Supple, Trachea Midline, No JVD, No Thyromegaly, Carotid Bruit (

Mild bilateral carotid bruits).  Denies: Lymphadenopathy


Lungs: Reports: Clear to Auscultation, Normal Respiratory Effort.  Denies: Rub


Cardiovascular: Reports: Regular Rate, Regular Rhythm, No Murmurs.  Denies: 

Gallops, Rubs


GI/Abdominal Exam: Normal Bowel Sounds, Soft, Non-Tender, No Organomegaly, No 

Distention, No Abnormal Bruit, No Mass, Other (Obese).  No: Guarding


 (Female) Exam: Deferred


Rectal (Female) Exam: Deferred


Back Exam: Reports: Decreased Range of Motion (Stable chronic), Other (Mild 

kyphosis).  Denies: CVA Tenderness (L), CVA Tenderness (R), Muscle Spasm, 

Paraspinal Tenderness, Vertebral Tenderness


Extremities: Normal Inspection, Normal Range of Motion, Non-Tender, No Pedal 

Edema, Normal Capillary Refill.  No: Kiana's Sign


Skin: Reports: Warm, Dry, Intact


Neurological: Reports: No New Focal Deficit, Other (Negative Babinski's)


Psy/Mental Status: Reports: Alert, Normal Affect, Normal Mood.  Denies: 

Depressed, Agitated, Hallucinations, Withdrawal Symptoms

## 2019-06-19 RX ADMIN — ISOSORBIDE MONONITRATE SCH MG: 60 TABLET, FILM COATED, EXTENDED RELEASE ORAL at 17:18

## 2019-06-19 RX ADMIN — INSULIN LISPRO SCH: 100 INJECTION, SUSPENSION SUBCUTANEOUS at 08:47

## 2019-06-19 RX ADMIN — INSULIN LISPRO SCH UNITS: 100 INJECTION, SUSPENSION SUBCUTANEOUS at 17:23

## 2019-06-19 RX ADMIN — CALCIUM CARBONATE-CHOLECALCIFEROL TAB 250 MG-125 UNIT SCH TAB: 250-125 TAB at 08:46

## 2019-06-19 RX ADMIN — INSULIN LISPRO SCH UNITS: 100 INJECTION, SUSPENSION SUBCUTANEOUS at 17:22

## 2019-06-19 RX ADMIN — OMEPRAZOLE SCH MG: 20 CAPSULE, DELAYED RELEASE ORAL at 08:40

## 2019-06-19 RX ADMIN — INSULIN LISPRO SCH UNITS: 100 INJECTION, SUSPENSION SUBCUTANEOUS at 08:48

## 2019-06-19 NOTE — PCM.SN
- Free Text/Narrative


Note: 





Hypoglycemic episode this evening with patient somewhat diaphoretic and briefly 

hypoxic with O2 required. No chest pain or other true anginal type symptoms. 

Note Accu-Chek of only 61 mg percent. Orange juice, etc. given. No sequelae. 

Nursing staff will continue to observe the patient closely. Her p.m. dose of 

NovoLog mix has been decreased secondary to the above reaction. Accu-Cheks at 3 

AM both this evening and tomorrow morning. Blood pressures continue to be 

variable. Continue medication adjustments, etc. as per previous discharge 

summary.

## 2019-06-20 LAB
CHLORIDE SERPL-SCNC: 101 MMOL/L (ref 98–107)
SODIUM SERPL-SCNC: 137 MMOL/L (ref 136–145)

## 2019-06-20 RX ADMIN — INSULIN LISPRO SCH UNITS: 100 INJECTION, SUSPENSION SUBCUTANEOUS at 07:35

## 2019-06-20 RX ADMIN — INSULIN LISPRO SCH UNITS: 100 INJECTION, SUSPENSION SUBCUTANEOUS at 17:28

## 2019-06-20 RX ADMIN — INSULIN LISPRO SCH UNITS: 100 INJECTION, SUSPENSION SUBCUTANEOUS at 17:30

## 2019-06-20 RX ADMIN — CALCIUM CARBONATE-CHOLECALCIFEROL TAB 250 MG-125 UNIT SCH TAB: 250-125 TAB at 07:33

## 2019-06-20 RX ADMIN — ISOSORBIDE MONONITRATE SCH MG: 60 TABLET, FILM COATED, EXTENDED RELEASE ORAL at 17:24

## 2019-06-20 RX ADMIN — OMEPRAZOLE SCH MG: 20 CAPSULE, DELAYED RELEASE ORAL at 07:31

## 2019-06-20 RX ADMIN — INSULIN LISPRO SCH UNITS: 100 INJECTION, SUSPENSION SUBCUTANEOUS at 07:34

## 2019-06-20 NOTE — PCM.PN
- General Info


Date of Service: 06/20/19


Admission Dx/Problem (Free Text): 





1.  CHF


2.  Coronary artery disease


3.  IDDM


4.  Hypertension


5.  Confusion


Functional Status: Reports: Pain Controlled, Tolerating Diet, Ambulating, 

Urinating, New Symptoms (Neck pain), Incentive Spirometry


Pain Score: 4 (Neck pain and shoulder pain)





- Review of Systems


General: Reports: Weakness (Improved with PT and OT in effect).  Denies: Fever, 

Fatigue, Malaise, Chills, Night Sweats, Appetite (Good)


HEENT: Reports: Glasses, Other (Stable nonspecific oral mucous)


Pulmonary: Reports: No Symptoms.  Denies: Shortness of Breath, Pleuritic Chest 

Pain, Cough, Sputum, Hemoptysis, Wheezing


Cardiovascular: Reports: No Symptoms.  Denies: Chest Pain, Palpitations, 

Dyspnea on Exertion, Orthopnea, Edema, Lightheadedness


Gastrointestinal: Reports: No Symptoms, Other (Normal bowel movement yesterday)

.  Denies: Abdominal Pain, Constipation, Decreased Appetite, Diarrhea, 

Difficulty Swallowing, Flatus, Hematochezia, Melena, Nausea, Vomiting


Genitourinary: Reports: No Symptoms.  Denies: Dysuria, Frequency, Burning, Pain

, Urgency, Incontinence, Retention, Flank Pain


Musculoskeletal: Reports: Neck Pain, Shoulder Pain.  Denies: Arm Pain, Back Pain

, Leg Pain, Joint Swelling


Skin: Reports: No Symptoms.  Denies: Diaphoresis, Bruising


Neurological: Reports: Confusion (Stable chronic), Weakness.  Denies: Headache, 

Numbness, Paresthesia, Pre-Existing Deficit, Syncope, Tingling, Trouble Speaking


Psychiatric: Reports: Confusion (Stable chronic).  Denies: Depression, Anxiety, 

Agitation, Hallucinations





- Patient Data


Vitals - Most Recent: 


 Last Vital Signs











Temp  37.0 C   06/20/19 06:00


 


Pulse  61   06/20/19 07:31


 


Resp  18   06/20/19 06:00


 


BP  138/64   06/20/19 07:31


 


Pulse Ox  92 L  06/20/19 06:00








 Vital Signs - 24 hr











  06/19/19 06/19/19 06/19/19





  12:00 17:17 17:18


 


Temperature [   





Temporal]   


 


Temperature [ 36.4 C  





Tympanic]   


 


Pulse,   62





Peripheral   


 


Pulse, 62  





Peripheral [   





Left Pulse   





Oximetry]   


 


Pulse,   





Peripheral [   





Right Brachial]   


 


Respiratory   





Rate   


 


Blood Pressure  145/75 H 145/75 H


 


Blood Pressure 145/75 H  





[Left Upper Arm   





]   


 


O2 Sat by Pulse   





Oximetry   














  06/19/19 06/19/19 06/20/19





  17:19 22:53 06:00


 


Temperature [  35.9 C 37.0 C





Temporal]   


 


Temperature [   





Tympanic]   


 


Pulse,   





Peripheral   


 


Pulse,   59 L





Peripheral [   





Left Pulse   





Oximetry]   


 


Pulse,  60 





Peripheral [   





Right Brachial]   


 


Respiratory  18 18





Rate   


 


Blood Pressure 145/75 H  


 


Blood Pressure  135/75 150/70 H





[Left Upper Arm   





]   


 


O2 Sat by Pulse  93 L 92 L





Oximetry   














  06/20/19 06/20/19





  07:30 07:31


 


Temperature [  





Temporal]  


 


Temperature [  





Tympanic]  


 


Pulse,  61





Peripheral  


 


Pulse,  





Peripheral [  





Left Pulse  





Oximetry]  


 


Pulse,  





Peripheral [  





Right Brachial]  


 


Respiratory  





Rate  


 


Blood Pressure 138/64 138/64


 


Blood Pressure  





[Left Upper Arm  





]  


 


O2 Sat by Pulse  





Oximetry  











Weight - Most Recent: 87.09 kg


I&O - Last 24 Hours: 


 Intake & Output











 06/19/19 06/20/19 06/20/19





 22:59 06:59 14:59


 


Intake Total 480 600 


 


Output Total  350 


 


Balance 480 250 











Imaging Impressions - Last 24 Hours: 





None


Lab Results Last 24 Hours: 


 Laboratory Results - last 24 hr











  06/19/19 06/20/19 06/20/19 Range/Units





  17:14 03:11 07:20 


 


WBC    11.4 H  (4.0-10.2)  K/uL


 


RBC    3.60 L  (3.77-5.09)  M/uL


 


Hgb    9.9 L  (11.7-15.5)  g/dL


 


Hct    30.7 L  (34.0-46.0)  %


 


MCV    85.3  (84.0-98.0)  fL


 


MCH    27.5 L  (28.2-33.3)  pg


 


MCHC    32.2  (31.7-36.0)  g/dL


 


RDW    13.9  (11.2-14.1)  %


 


Plt Count    199  (150-350)  K/uL


 


Neut % (Auto)    57.5  (45.0-80.0)  %


 


Lymph % (Auto)    33.3  (10.0-50.0)  %


 


Mono % (Auto)    6.3  (2.0-14.0)  %


 


Eos % (Auto)    2.6  (0.0-5.0)  %


 


Baso % (Auto)    0.3  (0.0-2.0)  %


 


Neut # (Auto)    6.58  (1.40-7.00)  K/uL


 


Lymph # (Auto)    3.81 H  (0.50-3.50)  K/uL


 


Mono # (Auto)    0.72  (0.00-1.00)  K/uL


 


Eos # (Auto)    0.30  (0.00-0.50)  K/uL


 


Baso # (Auto)    0.03  (0.00-0.20)  K/uL


 


Sodium     (136-145)  mmol/L


 


Potassium     (3.5-5.1)  mmol/L


 


Chloride     ()  mmol/L


 


Carbon Dioxide     (21.0-32.0)  mmol/L


 


BUN     (7-18)  mg/dL


 


Creatinine     (0.51-1.17)  mg/dL


 


Est Cr Clr Drug Dosing     mL/min


 


Estimated GFR (MDRD)     mL/min


 


Glucose     ()  mg/dL


 


POC Glucose  220 H  193 H   ()  mg/dl


 


Calcium     (8.5-10.1)  mg/dL


 


Magnesium     (1.8-2.4)  mg/dL


 


NT-Pro-B Natriuret Pep     (0-125)  pg/mL














  06/20/19 06/20/19 Range/Units





  07:20 07:23 


 


WBC    (4.0-10.2)  K/uL


 


RBC    (3.77-5.09)  M/uL


 


Hgb    (11.7-15.5)  g/dL


 


Hct    (34.0-46.0)  %


 


MCV    (84.0-98.0)  fL


 


MCH    (28.2-33.3)  pg


 


MCHC    (31.7-36.0)  g/dL


 


RDW    (11.2-14.1)  %


 


Plt Count    (150-350)  K/uL


 


Neut % (Auto)    (45.0-80.0)  %


 


Lymph % (Auto)    (10.0-50.0)  %


 


Mono % (Auto)    (2.0-14.0)  %


 


Eos % (Auto)    (0.0-5.0)  %


 


Baso % (Auto)    (0.0-2.0)  %


 


Neut # (Auto)    (1.40-7.00)  K/uL


 


Lymph # (Auto)    (0.50-3.50)  K/uL


 


Mono # (Auto)    (0.00-1.00)  K/uL


 


Eos # (Auto)    (0.00-0.50)  K/uL


 


Baso # (Auto)    (0.00-0.20)  K/uL


 


Sodium  137   (136-145)  mmol/L


 


Potassium  5.2 H   (3.5-5.1)  mmol/L


 


Chloride  101   ()  mmol/L


 


Carbon Dioxide  27.1   (21.0-32.0)  mmol/L


 


BUN  41 H   (7-18)  mg/dL


 


Creatinine  1.36 H   (0.51-1.17)  mg/dL


 


Est Cr Clr Drug Dosing  29.56   mL/min


 


Estimated GFR (MDRD)  38   mL/min


 


Glucose  183 H   ()  mg/dL


 


POC Glucose   195 H  ()  mg/dl


 


Calcium  8.7   (8.5-10.1)  mg/dL


 


Magnesium  1.3 L   (1.8-2.4)  mg/dL


 


NT-Pro-B Natriuret Pep  295 H   (0-125)  pg/mL











Lee Results Last 24 Hours: 


 Microbiology











 06/18/19 12:40 Urine Culture - Final





 Urine, Clean Catch    MIXED ALLIE SUGGESTIVE OF CONTAMINATION.











Med Orders - Current: 


 Current Medications





Acetaminophen (Tylenol Extra Strength)  1,000 mg PO Q6H PRN


   PRN Reason: Pain/Fever


   Last Admin: 06/19/19 22:30 Dose:  1,000 mg


Amlodipine Besylate (Norvasc)  10 mg PO DAILY [facility]


   Last Admin: 06/20/19 07:30 Dose:  10 mg


Aspirin (Halfprin)  81 mg PO DAILY [facility]


   Last Admin: 06/20/19 07:32 Dose:  81 mg


Calcium Carbonate (Oystcal-D 625 Mg-125 Units)  1 tab PO DAILY [facility]


   Last Admin: 06/20/19 07:33 Dose:  1 tab


Carvedilol (Coreg)  25 mg PO BIDAC [facility]


   Last Admin: 06/20/19 07:31 Dose:  25 mg


Cephalexin (Keflex)  500 mg PO QID [facility]


   Stop: 06/25/19 16:01


   Last Admin: 06/20/19 07:31 Dose:  500 mg


Citalopram Hydrobromide (Celexa)  20 mg PO DAILY [facility]


   Last Admin: 06/20/19 07:30 Dose:  20 mg


Clonidine HCl (Catapres)  0.1 mg PO QPM [facility]


   Last Admin: 06/19/19 17:19 Dose:  0.1 mg


Coenzyme Q10 (Coenzyme Q10)  100 mg PO TID [facility]


   Last Admin: 06/20/19 07:31 Dose:  100 mg


Enalapril Maleate (Vasotec)  20 mg PO BID [facility]


   Last Admin: 06/20/19 07:30 Dose:  20 mg


Ferrous Sulfate (Ferrous Sulfate)  325 mg PO BID [facility]


   Last Admin: 06/20/19 07:32 Dose:  325 mg


Furosemide (Lasix)  40 mg PO DAILY [facility]


   Last Admin: 06/20/19 07:30 Dose:  40 mg


Guaifenesin (Mucinex)  600 mg PO BID [facility]


   Last Admin: 06/20/19 07:32 Dose:  600 mg


Insulin Lispro Protam/Lispro Human ([facility] 75[facility]25)  [facility]

; [facility]


   Last Admin: 06/20/19 07:34 Dose:  2 units


Insulin Lispro Protam/Lispro Human (Humalog Mix 75-25)  30 unit SUBCUT DAILY@

0730 [facility]


   Last Admin: 06/20/19 07:35 Dose:  30 units


Insulin Lispro Protam/Lispro Human (Humalog Mix 75-25)  24 unit SUBCUT QPM@1730 

[facility]


   Last Admin: 06/19/19 17:23 Dose:  24 units


Isosorbide Mononitrate (Imdur)  60 mg PO QPM [facility]


   Last Admin: 06/19/19 17:18 Dose:  60 mg


Lovastatin (Mevacor)  20 mg PO DAILY@1700 [facility]


   Last Admin: 06/19/19 17:19 Dose:  20 mg


Magnesium Oxide (Magnesium Oxide)  800 mg PO BID [facility]


   Last Admin: 06/20/19 07:32 Dose:  800 mg


Omeprazole (Omeprazole)  20 mg PO DAILY [facility]


   Last Admin: 06/20/19 07:31 Dose:  20 mg


Senna/Docusate Sodium (Senna Plus)  1 tab PO BID [facility]


   Last Admin: 06/20/19 07:33 Dose:  1 tab


Spironolactone (Aldactone)  12.5 mg PO BID [facility]





Discontinued Medications





Furosemide (Lasix)  20 mg PO DAILY@1200 [facility]


   Last Admin: 06/19/19 11:03 Dose:  20 mg


Insulin Lispro Protam/Lispro Human (Humalog Mix 75-25)  30 unit SUBCUT BIDAC [facility]


   Last Admin: 06/18/19 17:34 Dose:  30 units


Magnesium Oxide (Magnesium Oxide)  400 mg PO BID [facility]


Spironolactone (Aldactone)  25 mg PO BID [facility]


   Last Admin: 06/20/19 07:30 Dose:  25 mg


Temazepam (Restoril)  15 mg PO BEDTIME PRN


   PRN Reason: Insomnia


   Last Admin: 06/18/19 23:57 Dose:  15 mg











- Exam


Quality Assessment: Supplemental Oxygen, DVT Prophylaxis.  No: Central Line/PICC

, Urine Catheter, Skin Breakdown, Restraints


General: Alert, Cooperative, No Acute Distress.  No: Oriented (Stable mild to 

moderate confusion)


HEENT: Pupils Equal, Pupils Reactive, EOMI, Mucous Membr. Moist/Pink, Other (

She is wearing glasses).  No: Scleral Icterus


Neck: Supple, Trachea Midline, No JVD, No Thyromegaly, Carotid Bruit (Mild 

Bilateral carotid bruits versus transmitted heart sounds), Other (No 

significant cervical muscle spasms or palpation pain).  No: Lymphadenopathy


Lungs: Clear to Auscultation, Normal Respiratory Effort.  No: Rub


Cardiovascular: Regular Rate, Regular Rhythm, No Murmurs.  No: Gallops, Rubs


GI/Abdominal Exam: Normal Bowel Sounds, Soft, Non-Tender, No Organomegaly, No 

Distention, No Abnormal Bruit, No Mass, Other (Obese).  No: Guarding


 (Female) Exam: Deferred


Back Exam: Full Range of Motion, Other (Mild kyphosis).  No: CVA Tenderness (L)

, CVA Tenderness (R), Muscle Spasm, Paraspinal Tenderness, Vertebral Tenderness


Extremities: Non-Tender, No Pedal Edema, Normal Capillary Refill, Limited Range 

of Motion (Stable decreased range of motion of her shoulders bilaterally).  No: 

Kiana's Sign


Peripheral Pulses: 2+: Radial (L), Radial (R), Dorsalis Pedis (L), Dorsalis 

Pedis (R)


Skin: Warm, Dry, Intact.  No: Ecchymosis


Neurological: No New Focal Deficit


Psy/Mental Status: Alert, Normal Affect, Normal Mood.  No: Hallucinations, 

Withdrawal Symptoms





- Problem List & Annotations


(1) IDDM (insulin dependent diabetes mellitus)


SNOMED Code(s): 08535665


   Code(s): E11.9 - TYPE 2 DIABETES MELLITUS WITHOUT COMPLICATIONS; Z79.4 - 

LONG TERM (CURRENT) USE OF INSULIN   Status: Chronic   Priority: High   Current 

Visit: No   Annotation/Comment:: Note previous hypoglycemic episode with 

adjustment of her insulin therapy as per previous simple provider note. Her Accu

-Cheks continued to improve slowly. 3 AM Accu-Cheks this evening 193 mg percent 

with no return of hypoglycemic symptoms. Dietary issues discussed with dietary 

consultation ordered. patient's diabetes is under extremely poor control with 

glycosylated hemoglobin of 12.5 on admission to acute care with this to be 

repeated in 3 months after discharge. She also has significant 

hypertriglyceridemia with sliding scale in effect, however further adjustments 

required during this swing bed care. Change to Humalog mix preparations 

secondary to finances, etc.. Home health has not been effective in the past 

secondary to patient's confusion and medication noncompliance. Note history of 

diabetic nephropathy and neuropathy.    





(2) Hypertension


SNOMED Code(s): 45651657


   Code(s): I10 - ESSENTIAL (PRIMARY) HYPERTENSION   Status: Chronic   Priority

: Medium   Current Visit: No   


Qualifiers: 


   Hypertension type: essential hypertension 


Annotation/Comment:: Blood pressures much improved at this time. Continue low-

dose clonidine for now with previous bradycardia. Continue vital signs on a 

every shift basis during her swing bed care with further medication adjustments 

likely required during the next couple of weeks.    





(3) CHF (congestive heart failure)


SNOMED Code(s): 30261794


   Code(s): I50.9 - HEART FAILURE, UNSPECIFIED   Status: Chronic   Priority: 

Medium   Current Visit: Yes   


Qualifiers: 


   Heart failure type: unspecified   Heart failure chronicity: chronic   

Qualified Code(s): I50.9 - Heart failure, unspecified   


Annotation/Comment:: As above. Continue medication adjustments as above. 

Uncertain as to when last evaluation/echo performed. Note current FULL CODE 

STATUS. Consider echocardiogram on an outpatient basis after her medications 

have been adjusted depending on her family's wishes. Adjust diuretic therapy 

with caution secondary to her recent electrolyte disturbances prior to this 

hospitalization as below.   





(4) CAD (coronary artery disease)


SNOMED Code(s): 88551081


   Code(s): I25.10 - ATHSCL HEART DISEASE OF NATIVE CORONARY ARTERY W/O ANG 

PCTRS   Status: Chronic   Priority: Medium   Current Visit: Yes   


Qualifiers: 


   Coronary Disease-Associated Artery/Lesion type: native artery   Native vs. 

transplanted heart: native heart   Associated angina: without angina   

Qualified Code(s): I25.10 - Atherosclerotic heart disease of native coronary 

artery without angina pectoris   


Annotation/Comment:: No chest pain or anginal type symptoms BNP is improved at 

this time. Continue current Imdur therapy. Seroquel was discontinued on 6/16 

secondary to its CHF effects.    





(5) Hyperkalemia


SNOMED Code(s): 77237898


   Code(s): E87.5 - HYPERKALEMIA   Status: Acute   Priority: Medium   Current 

Visit: Yes   Onset Date: 06/18/19   Annotation/Comment:: Mild persistent 

hyperkalemia despite previous increased Lasix therapy after increase of her 

previous spironolactone. Her blood pressures have improved with increased Imdur 

therapy with Lasix therapy and spironolactone to be decreased at this time with 

repeat blood work in one week. Continue further medication adjustments as 

before. No additional potassium supplementation at this time.   





(6) Hypomagnesemia


SNOMED Code(s): 291169161


   Code(s): E83.42 - HYPOMAGNESEMIA   Status: Acute   Priority: Medium   

Current Visit: Yes   Annotation/Comment:: Returned hypomagnesemia on 6/17, 

which was somewhat progressive on 6/18 secondary to IV Lasix therapy. Further 

increase of her magnesium oxide supplementation on 6/18, which should also be 

beneficial for her chronic constipation, which will be continued for now. 

Likely decrease of her magnesium oxide supplementation at time of follow-up 

blood work in one week especially in light of decreased Lasix therapy as above. 

Note previous multiple electrolyte disturbances prior to recent acute care 

admission.    





(7) COPD (chronic obstructive pulmonary disease)


SNOMED Code(s): 92762412


   Code(s): J44.9 - CHRONIC OBSTRUCTIVE PULMONARY DISEASE, UNSPECIFIED   Status

: Chronic   Priority: Medium   Current Visit: Yes   


Qualifiers: 


   COPD type: emphysema   Emphysema type: panlobular   Qualified Code(s): J43.1 

- Panlobular emphysema   


Annotation/Comment:: Mild persistent but stable nonspecific oral mucous without 

cough, wheezing, dyspnea, etc. Consider nebulizer therapy. Continue Mucinex and 

Keflex for now with persistent mild leukocytosis, however no fever, etc.  Note 

history of sleep apnea with patient not currently using a CPAP. Attempt to see 

if patient will initiate CPAP therapy, which will also be beneficial for her 

blood pressure control. Note current O2 supplementation.   





(8) Hyperlipidemia


SNOMED Code(s): 69401051


   Code(s): E78.5 - HYPERLIPIDEMIA, UNSPECIFIED   Status: Chronic   Priority: 

Medium   Current Visit: No   


Qualifiers: 


   Hyperlipidemia type: mixed hyperlipidemia 


Annotation/Comment:: Patient did have nonspecific intolerance to Zocor in the 

past. She did tolerate Mevacor well, which was initiated on 6/16, with 

additional aggressive coenzyme Q10 therapy.  Significant hypertriglyceridemia 

secondary to her uncontrolled IDDM. Continue further medication and dietary 

adjustments. Weight loss in moderation advisable. Repeat lipid panel in about 3 

months.   





(9) Iron (Fe) deficiency anemia


SNOMED Code(s): 33600492


   Code(s): D50.9 - IRON DEFICIENCY ANEMIA, UNSPECIFIED   Status: Chronic   

Priority: Medium   Current Visit: No   


Qualifiers: 


 


Annotation/Comment:: Mild progressive anemia with stools ordered for H. pylori 

and Hemoccult, however no evidence of acute GI bleed, etc. Repeat blood work in 

one week. Iron studies on 6/17 show persistent iron deficiency with increase of 

her iron supplementation on 6/17. Hemoglobin stable. Recommend repeat iron 

studies in 4 weeks.   





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


SNOMED Code(s): 38197930


   Code(s): G31.01 - PICK'S DISEASE; F02.80 - DEMENTIA IN OTH DISEASES CLASSD 

ELSWHR W/O BEHAVRL DISTURB   Status: Chronic   Priority: High   Current Visit: 

No   


Qualifiers: 


 


Annotation/Comment:: Stable. Note discontinuation of Restoril secondary to 

patient's baseline confusion and nonspecific reaction in inpatient care and 

initial day of swing bed care. Patient also previously stated that she is not 

trusting that she needs to be on all of these medications and has issues 

affording her meds. Given the severity of her noncompliance issues and lack of 

insight into the problem/consequences, it might be in her best interest to be 

placed in a living situation where she gets consistent daily assistance with 

medication administration. She has had home health doing weekly med set up in 

past but patient was still non-compliant when left to her own device. Per the 

family's request the patient was transferred to swing bed care placement, 

however only on a short-term basis per the patient's request for now. Patient 

does not wish to be admitted to the nursing home. She has just moved to this 

area in May. Physical therapy and occupational therapy has evaluated the 

patient and has qualified her for treatment of her bilateral shoulder pain. 

 consultation also in effect for possible nursing home placement 

on a longer-term basis, help in the costs of her medications, etc. We did 

change from Pepcid to Prilosec during her acute care secondary to her 

confusion.   





(11) Sleep apnea


SNOMED Code(s): 45215185


   Code(s): G47.30 - SLEEP APNEA, UNSPECIFIED   Status: Chronic   Priority: 

Medium   Current Visit: Yes   


Qualifiers: 


 


Annotation/Comment:: As above   





(12) Renal insufficiency


SNOMED Code(s): 186171287, 598871992


   Code(s): N28.9 - DISORDER OF KIDNEY AND URETER, UNSPECIFIED   Status: Acute 

  Priority: Medium   Current Visit: Yes   Annotation/Comment:: Mildly 

progressive diabetic nephropathy as above.   





- Problem List Review


Problem List Initiated/Reviewed/Updated: Yes





- My Orders


Last 24 Hours: 


My Active Orders





06/19/19 17:30


Insulin Lispro Prot/Lispro [HumaLOG Mix 75-25]   24 unit SUBCUT QPM@1730 





06/19/19 18:00


Isosorbide Mononitrate [Imdur]   60 mg PO QPM 





06/20/19 08:58


H PYLORI STOOL ANTIGEN [MREF] Routine 


OCCULT BLOOD DIAGNOSTIC [OP] Routine 





06/20/19 09:16


Communication Order [RC] ROUTINE 





06/20/19 18:00


Spironolactone [Aldactone]   12.5 mg PO BID 





06/27/19 05:11


CBC WITH AUTO DIFF [HEME] Routine 


COMPREHENSIVE METABOLIC PN,CMP [CHEM] Routine 


MAGNESIUM [CHEM] Routine 


PRO B-TYPE NATRIUR PEPT,BNPPRO [CHEM] Routine 


TROPONIN I [CHEM] Routine 














- Assessment


Assessment:: 





As above





- Plan


Plan:: 





As above. Extensive precautions were given to the patient, who is in agreement 

with the treatment plan.

## 2019-06-21 RX ADMIN — CALCIUM CARBONATE-CHOLECALCIFEROL TAB 250 MG-125 UNIT SCH TAB: 250-125 TAB at 08:41

## 2019-06-21 RX ADMIN — OMEPRAZOLE SCH MG: 20 CAPSULE, DELAYED RELEASE ORAL at 08:25

## 2019-06-21 RX ADMIN — INSULIN LISPRO SCH UNITS: 100 INJECTION, SUSPENSION SUBCUTANEOUS at 08:36

## 2019-06-21 RX ADMIN — INSULIN LISPRO SCH UNITS: 100 INJECTION, SUSPENSION SUBCUTANEOUS at 17:34

## 2019-06-21 RX ADMIN — INSULIN LISPRO SCH UNITS: 100 INJECTION, SUSPENSION SUBCUTANEOUS at 08:35

## 2019-06-21 RX ADMIN — INSULIN LISPRO SCH UNITS: 100 INJECTION, SUSPENSION SUBCUTANEOUS at 17:32

## 2019-06-21 RX ADMIN — ISOSORBIDE MONONITRATE SCH MG: 60 TABLET, FILM COATED, EXTENDED RELEASE ORAL at 17:38

## 2019-06-22 RX ADMIN — CALCIUM CARBONATE-CHOLECALCIFEROL TAB 250 MG-125 UNIT SCH TAB: 250-125 TAB at 07:56

## 2019-06-22 RX ADMIN — INSULIN LISPRO SCH UNITS: 100 INJECTION, SUSPENSION SUBCUTANEOUS at 17:03

## 2019-06-22 RX ADMIN — INSULIN LISPRO SCH UNITS: 100 INJECTION, SUSPENSION SUBCUTANEOUS at 08:01

## 2019-06-22 RX ADMIN — ISOSORBIDE MONONITRATE SCH MG: 60 TABLET, FILM COATED, EXTENDED RELEASE ORAL at 17:12

## 2019-06-22 RX ADMIN — INSULIN LISPRO SCH UNITS: 100 INJECTION, SUSPENSION SUBCUTANEOUS at 08:00

## 2019-06-22 RX ADMIN — OMEPRAZOLE SCH MG: 20 CAPSULE, DELAYED RELEASE ORAL at 07:54

## 2019-06-23 RX ADMIN — CALCIUM CARBONATE-CHOLECALCIFEROL TAB 250 MG-125 UNIT SCH TAB: 250-125 TAB at 08:52

## 2019-06-23 RX ADMIN — ISOSORBIDE MONONITRATE SCH MG: 60 TABLET, FILM COATED, EXTENDED RELEASE ORAL at 17:19

## 2019-06-23 RX ADMIN — INSULIN LISPRO SCH: 100 INJECTION, SUSPENSION SUBCUTANEOUS at 08:06

## 2019-06-23 RX ADMIN — INSULIN LISPRO SCH UNITS: 100 INJECTION, SUSPENSION SUBCUTANEOUS at 17:23

## 2019-06-23 RX ADMIN — OMEPRAZOLE SCH MG: 20 CAPSULE, DELAYED RELEASE ORAL at 08:48

## 2019-06-23 RX ADMIN — INSULIN LISPRO SCH UNITS: 100 INJECTION, SUSPENSION SUBCUTANEOUS at 08:50

## 2019-06-24 RX ADMIN — ISOSORBIDE MONONITRATE SCH MG: 60 TABLET, FILM COATED, EXTENDED RELEASE ORAL at 17:08

## 2019-06-24 RX ADMIN — INSULIN LISPRO SCH UNITS: 100 INJECTION, SUSPENSION SUBCUTANEOUS at 17:11

## 2019-06-24 RX ADMIN — INSULIN LISPRO SCH UNITS: 100 INJECTION, SUSPENSION SUBCUTANEOUS at 08:28

## 2019-06-24 RX ADMIN — CALCIUM CARBONATE-CHOLECALCIFEROL TAB 250 MG-125 UNIT SCH TAB: 250-125 TAB at 08:21

## 2019-06-24 RX ADMIN — OMEPRAZOLE SCH MG: 20 CAPSULE, DELAYED RELEASE ORAL at 08:22

## 2019-06-24 RX ADMIN — INSULIN LISPRO SCH UNITS: 100 INJECTION, SUSPENSION SUBCUTANEOUS at 17:12

## 2019-06-25 RX ADMIN — CALCIUM CARBONATE-CHOLECALCIFEROL TAB 250 MG-125 UNIT SCH TAB: 250-125 TAB at 08:56

## 2019-06-25 RX ADMIN — INSULIN LISPRO SCH UNITS: 100 INJECTION, SUSPENSION SUBCUTANEOUS at 17:37

## 2019-06-25 RX ADMIN — ISOSORBIDE MONONITRATE SCH MG: 60 TABLET, FILM COATED, EXTENDED RELEASE ORAL at 17:34

## 2019-06-25 RX ADMIN — INSULIN LISPRO SCH UNITS: 100 INJECTION, SUSPENSION SUBCUTANEOUS at 08:58

## 2019-06-25 RX ADMIN — OMEPRAZOLE SCH MG: 20 CAPSULE, DELAYED RELEASE ORAL at 08:54

## 2019-06-25 RX ADMIN — INSULIN LISPRO SCH UNITS: 100 INJECTION, SUSPENSION SUBCUTANEOUS at 17:38

## 2019-06-26 RX ADMIN — INSULIN LISPRO SCH UNITS: 100 INJECTION, SUSPENSION SUBCUTANEOUS at 17:37

## 2019-06-26 RX ADMIN — INSULIN LISPRO SCH UNITS: 100 INJECTION, SUSPENSION SUBCUTANEOUS at 17:38

## 2019-06-26 RX ADMIN — OMEPRAZOLE SCH MG: 20 CAPSULE, DELAYED RELEASE ORAL at 08:04

## 2019-06-26 RX ADMIN — INSULIN LISPRO SCH: 100 INJECTION, SUSPENSION SUBCUTANEOUS at 07:52

## 2019-06-26 RX ADMIN — CALCIUM CARBONATE-CHOLECALCIFEROL TAB 250 MG-125 UNIT SCH TAB: 250-125 TAB at 08:04

## 2019-06-26 RX ADMIN — INSULIN LISPRO SCH UNITS: 100 INJECTION, SUSPENSION SUBCUTANEOUS at 07:54

## 2019-06-26 RX ADMIN — ISOSORBIDE MONONITRATE SCH MG: 60 TABLET, FILM COATED, EXTENDED RELEASE ORAL at 17:33

## 2019-06-27 LAB
CHLORIDE SERPL-SCNC: 105 MMOL/L (ref 98–107)
SODIUM SERPL-SCNC: 137 MMOL/L (ref 136–145)

## 2019-06-27 RX ADMIN — ISOSORBIDE MONONITRATE SCH MG: 60 TABLET, FILM COATED, EXTENDED RELEASE ORAL at 17:42

## 2019-06-27 RX ADMIN — INSULIN LISPRO SCH UNITS: 100 INJECTION, SUSPENSION SUBCUTANEOUS at 17:37

## 2019-06-27 RX ADMIN — INSULIN LISPRO SCH UNITS: 100 INJECTION, SUSPENSION SUBCUTANEOUS at 08:31

## 2019-06-27 RX ADMIN — CALCIUM CARBONATE-CHOLECALCIFEROL TAB 250 MG-125 UNIT SCH TAB: 250-125 TAB at 08:35

## 2019-06-27 RX ADMIN — OMEPRAZOLE SCH MG: 20 CAPSULE, DELAYED RELEASE ORAL at 08:30

## 2019-06-27 RX ADMIN — INSULIN LISPRO SCH UNITS: 100 INJECTION, SUSPENSION SUBCUTANEOUS at 08:32

## 2019-06-27 RX ADMIN — AMOXICILLIN AND CLAVULANATE POTASSIUM SCH TAB: 875; 125 TABLET, FILM COATED ORAL at 20:19

## 2019-06-27 RX ADMIN — INSULIN LISPRO SCH: 100 INJECTION, SUSPENSION SUBCUTANEOUS at 17:39

## 2019-06-27 NOTE — PCM.SN
- Free Text/Narrative


Note: 





Patient transferred her care to Tegan Muro CNP, who I extensively updated 

today concerning patient's previous Inpatient and current Swing Bed care, 

including today's labs, labile IDDM, uncontrolled hypertension, intermittent 

bradycardia secondary to meds, hyperkaliemia, hypomagnesiumia, anemia, 

persistent leukocytosis, etc.. We did discuss my medication changes today and 

lab orders for tomorrow, although her sliding scale does still need to be 

reduced secondary to borderline hypoglycemic episode yesterday and patient's 

non compliance with medical therapy, snacks, etc. in the past. Close follow up 

by new regular provider with transfer to [facility] advisable for further workup and 

inpatient care, if patient continues to be refractory to medication changes and 

Swing Bed care.

## 2019-06-28 LAB
CHLORIDE SERPL-SCNC: 103 MMOL/L (ref 98–107)
SODIUM SERPL-SCNC: 137 MMOL/L (ref 136–145)

## 2019-06-28 RX ADMIN — INSULIN LISPRO SCH: 100 INJECTION, SUSPENSION SUBCUTANEOUS at 08:48

## 2019-06-28 RX ADMIN — INSULIN LISPRO SCH UNITS: 100 INJECTION, SUSPENSION SUBCUTANEOUS at 17:42

## 2019-06-28 RX ADMIN — OMEPRAZOLE SCH MG: 20 CAPSULE, DELAYED RELEASE ORAL at 08:54

## 2019-06-28 RX ADMIN — AMOXICILLIN AND CLAVULANATE POTASSIUM SCH TAB: 875; 125 TABLET, FILM COATED ORAL at 20:06

## 2019-06-28 RX ADMIN — ISOSORBIDE MONONITRATE SCH MG: 60 TABLET, FILM COATED, EXTENDED RELEASE ORAL at 17:46

## 2019-06-28 RX ADMIN — Medication SCH ML: at 20:06

## 2019-06-28 RX ADMIN — Medication PRN ML: at 10:32

## 2019-06-28 RX ADMIN — INSULIN LISPRO SCH UNITS: 100 INJECTION, SUSPENSION SUBCUTANEOUS at 08:45

## 2019-06-28 RX ADMIN — INSULIN LISPRO SCH UNITS: 100 INJECTION, SUSPENSION SUBCUTANEOUS at 17:40

## 2019-06-28 RX ADMIN — CALCIUM CARBONATE-CHOLECALCIFEROL TAB 250 MG-125 UNIT SCH TAB: 250-125 TAB at 08:54

## 2019-06-28 RX ADMIN — Medication PRN ML: at 17:51

## 2019-06-28 RX ADMIN — AMOXICILLIN AND CLAVULANATE POTASSIUM SCH TAB: 875; 125 TABLET, FILM COATED ORAL at 08:49

## 2019-06-28 NOTE — PCM.SN
- Free Text/Narrative


Note: 





Patient continues to have an elevated K level. We will give Kayexalate today, 

recheck BMP tomorrow. Her creatinine level increased. We will change her 

Magnesium to 400mg TID, decrease her IV lasix to 20mg BID and monitor her BP, 

which seems to be elevating again.

## 2019-06-29 RX ADMIN — Medication SCH ML: at 19:44

## 2019-06-29 RX ADMIN — INSULIN LISPRO SCH UNITS: 100 INJECTION, SUSPENSION SUBCUTANEOUS at 08:25

## 2019-06-29 RX ADMIN — INSULIN LISPRO SCH: 100 INJECTION, SUSPENSION SUBCUTANEOUS at 08:27

## 2019-06-29 RX ADMIN — Medication PRN ML: at 10:33

## 2019-06-29 RX ADMIN — Medication SCH ML: at 08:32

## 2019-06-29 RX ADMIN — ISOSORBIDE MONONITRATE SCH MG: 60 TABLET, FILM COATED, EXTENDED RELEASE ORAL at 19:38

## 2019-06-29 RX ADMIN — CALCIUM CARBONATE-CHOLECALCIFEROL TAB 250 MG-125 UNIT SCH TAB: 250-125 TAB at 08:31

## 2019-06-29 RX ADMIN — AMOXICILLIN AND CLAVULANATE POTASSIUM SCH TAB: 875; 125 TABLET, FILM COATED ORAL at 19:43

## 2019-06-29 RX ADMIN — INSULIN LISPRO SCH UNITS: 100 INJECTION, SUSPENSION SUBCUTANEOUS at 19:34

## 2019-06-29 RX ADMIN — Medication PRN ML: at 02:13

## 2019-06-29 RX ADMIN — AMOXICILLIN AND CLAVULANATE POTASSIUM SCH TAB: 875; 125 TABLET, FILM COATED ORAL at 08:27

## 2019-06-29 RX ADMIN — OMEPRAZOLE SCH MG: 20 CAPSULE, DELAYED RELEASE ORAL at 08:31

## 2019-06-29 RX ADMIN — INSULIN LISPRO SCH UNITS: 100 INJECTION, SUSPENSION SUBCUTANEOUS at 19:31

## 2019-06-30 RX ADMIN — INSULIN LISPRO SCH UNITS: 100 INJECTION, SUSPENSION SUBCUTANEOUS at 08:59

## 2019-06-30 RX ADMIN — OMEPRAZOLE SCH MG: 20 CAPSULE, DELAYED RELEASE ORAL at 08:35

## 2019-06-30 RX ADMIN — ISOSORBIDE MONONITRATE SCH MG: 60 TABLET, FILM COATED, EXTENDED RELEASE ORAL at 17:51

## 2019-06-30 RX ADMIN — INSULIN LISPRO SCH: 100 INJECTION, SUSPENSION SUBCUTANEOUS at 08:27

## 2019-06-30 RX ADMIN — Medication PRN ML: at 04:12

## 2019-06-30 RX ADMIN — INSULIN LISPRO SCH UNITS: 100 INJECTION, SUSPENSION SUBCUTANEOUS at 17:47

## 2019-06-30 RX ADMIN — AMOXICILLIN AND CLAVULANATE POTASSIUM SCH TAB: 875; 125 TABLET, FILM COATED ORAL at 20:08

## 2019-06-30 RX ADMIN — INSULIN LISPRO SCH: 100 INJECTION, SUSPENSION SUBCUTANEOUS at 17:42

## 2019-06-30 RX ADMIN — Medication SCH ML: at 20:11

## 2019-06-30 RX ADMIN — AMOXICILLIN AND CLAVULANATE POTASSIUM SCH TAB: 875; 125 TABLET, FILM COATED ORAL at 08:31

## 2019-06-30 RX ADMIN — Medication SCH ML: at 08:39

## 2019-06-30 RX ADMIN — CALCIUM CARBONATE-CHOLECALCIFEROL TAB 250 MG-125 UNIT SCH TAB: 250-125 TAB at 09:00

## 2019-07-01 RX ADMIN — Medication SCH ML: at 08:06

## 2019-07-01 RX ADMIN — CALCIUM CARBONATE-CHOLECALCIFEROL TAB 250 MG-125 UNIT SCH TAB: 250-125 TAB at 08:04

## 2019-07-01 RX ADMIN — OMEPRAZOLE SCH MG: 20 CAPSULE, DELAYED RELEASE ORAL at 08:03

## 2019-07-01 RX ADMIN — AMOXICILLIN AND CLAVULANATE POTASSIUM SCH TAB: 875; 125 TABLET, FILM COATED ORAL at 20:37

## 2019-07-01 RX ADMIN — INSULIN LISPRO SCH: 100 INJECTION, SUSPENSION SUBCUTANEOUS at 07:52

## 2019-07-01 RX ADMIN — Medication SCH ML: at 20:38

## 2019-07-01 RX ADMIN — INSULIN LISPRO SCH UNITS: 100 INJECTION, SUSPENSION SUBCUTANEOUS at 07:54

## 2019-07-01 RX ADMIN — INSULIN LISPRO SCH UNITS: 100 INJECTION, SUSPENSION SUBCUTANEOUS at 17:29

## 2019-07-01 RX ADMIN — ISOSORBIDE MONONITRATE SCH MG: 60 TABLET, FILM COATED, EXTENDED RELEASE ORAL at 17:34

## 2019-07-01 RX ADMIN — Medication PRN ML: at 17:37

## 2019-07-01 RX ADMIN — MAGNESIUM HYDROXIDE PRN ML: 400 SUSPENSION ORAL at 23:22

## 2019-07-01 RX ADMIN — Medication PRN ML: at 01:15

## 2019-07-01 RX ADMIN — INSULIN LISPRO SCH UNITS: 100 INJECTION, SUSPENSION SUBCUTANEOUS at 17:28

## 2019-07-01 RX ADMIN — AMOXICILLIN AND CLAVULANATE POTASSIUM SCH TAB: 875; 125 TABLET, FILM COATED ORAL at 07:57

## 2019-07-02 LAB
CHLORIDE SERPL-SCNC: 102 MMOL/L (ref 98–107)
SODIUM SERPL-SCNC: 138 MMOL/L (ref 136–145)

## 2019-07-02 RX ADMIN — INSULIN LISPRO SCH: 100 INJECTION, SUSPENSION SUBCUTANEOUS at 17:33

## 2019-07-02 RX ADMIN — INSULIN LISPRO SCH UNITS: 100 INJECTION, SUSPENSION SUBCUTANEOUS at 17:35

## 2019-07-02 RX ADMIN — AMOXICILLIN AND CLAVULANATE POTASSIUM SCH TAB: 875; 125 TABLET, FILM COATED ORAL at 07:53

## 2019-07-02 RX ADMIN — AMOXICILLIN AND CLAVULANATE POTASSIUM SCH TAB: 875; 125 TABLET, FILM COATED ORAL at 20:56

## 2019-07-02 RX ADMIN — Medication SCH ML: at 08:00

## 2019-07-02 RX ADMIN — INSULIN LISPRO SCH UNITS: 100 INJECTION, SUSPENSION SUBCUTANEOUS at 07:54

## 2019-07-02 RX ADMIN — Medication SCH: at 20:22

## 2019-07-02 RX ADMIN — CALCIUM CARBONATE-CHOLECALCIFEROL TAB 250 MG-125 UNIT SCH TAB: 250-125 TAB at 08:12

## 2019-07-02 RX ADMIN — ISOSORBIDE MONONITRATE SCH MG: 60 TABLET, FILM COATED, EXTENDED RELEASE ORAL at 17:28

## 2019-07-02 RX ADMIN — Medication PRN ML: at 02:27

## 2019-07-02 RX ADMIN — INSULIN LISPRO SCH UNITS: 100 INJECTION, SUSPENSION SUBCUTANEOUS at 07:55

## 2019-07-02 RX ADMIN — OMEPRAZOLE SCH MG: 20 CAPSULE, DELAYED RELEASE ORAL at 07:59

## 2019-07-03 LAB
CHLORIDE SERPL-SCNC: 104 MMOL/L (ref 98–107)
SODIUM SERPL-SCNC: 140 MMOL/L (ref 136–145)

## 2019-07-03 RX ADMIN — AMOXICILLIN AND CLAVULANATE POTASSIUM SCH TAB: 875; 125 TABLET, FILM COATED ORAL at 20:10

## 2019-07-03 RX ADMIN — CALCIUM CARBONATE-CHOLECALCIFEROL TAB 250 MG-125 UNIT SCH TAB: 250-125 TAB at 08:12

## 2019-07-03 RX ADMIN — ISOSORBIDE MONONITRATE SCH MG: 60 TABLET, FILM COATED, EXTENDED RELEASE ORAL at 17:30

## 2019-07-03 RX ADMIN — AMOXICILLIN AND CLAVULANATE POTASSIUM SCH TAB: 875; 125 TABLET, FILM COATED ORAL at 08:02

## 2019-07-03 RX ADMIN — INSULIN LISPRO SCH UNITS: 100 INJECTION, SUSPENSION SUBCUTANEOUS at 08:03

## 2019-07-03 RX ADMIN — OMEPRAZOLE SCH MG: 20 CAPSULE, DELAYED RELEASE ORAL at 08:12

## 2019-07-03 RX ADMIN — INSULIN LISPRO SCH: 100 INJECTION, SUSPENSION SUBCUTANEOUS at 08:06

## 2019-07-03 RX ADMIN — MAGNESIUM HYDROXIDE PRN ML: 400 SUSPENSION ORAL at 10:06

## 2019-07-03 RX ADMIN — INSULIN LISPRO SCH UNITS: 100 INJECTION, SUSPENSION SUBCUTANEOUS at 17:21

## 2019-07-03 RX ADMIN — INSULIN LISPRO SCH UNITS: 100 INJECTION, SUSPENSION SUBCUTANEOUS at 17:29

## 2019-07-04 RX ADMIN — INSULIN LISPRO SCH UNITS: 100 INJECTION, SUSPENSION SUBCUTANEOUS at 17:22

## 2019-07-04 RX ADMIN — AMOXICILLIN AND CLAVULANATE POTASSIUM SCH TAB: 875; 125 TABLET, FILM COATED ORAL at 07:58

## 2019-07-04 RX ADMIN — INSULIN LISPRO SCH UNITS: 100 INJECTION, SUSPENSION SUBCUTANEOUS at 08:04

## 2019-07-04 RX ADMIN — ISOSORBIDE MONONITRATE SCH MG: 60 TABLET, FILM COATED, EXTENDED RELEASE ORAL at 17:19

## 2019-07-04 RX ADMIN — OMEPRAZOLE SCH MG: 20 CAPSULE, DELAYED RELEASE ORAL at 08:01

## 2019-07-04 RX ADMIN — CALCIUM CARBONATE-CHOLECALCIFEROL TAB 250 MG-125 UNIT SCH TAB: 250-125 TAB at 07:49

## 2019-07-04 RX ADMIN — AMOXICILLIN AND CLAVULANATE POTASSIUM SCH TAB: 875; 125 TABLET, FILM COATED ORAL at 20:19

## 2019-07-04 RX ADMIN — INSULIN LISPRO SCH UNITS: 100 INJECTION, SUSPENSION SUBCUTANEOUS at 17:24

## 2019-07-05 LAB
CHLORIDE SERPL-SCNC: 105 MMOL/L (ref 98–107)
SODIUM SERPL-SCNC: 140 MMOL/L (ref 136–145)

## 2019-07-05 RX ADMIN — INSULIN LISPRO SCH UNITS: 100 INJECTION, SUSPENSION SUBCUTANEOUS at 07:59

## 2019-07-05 RX ADMIN — OMEPRAZOLE SCH MG: 20 CAPSULE, DELAYED RELEASE ORAL at 08:12

## 2019-07-05 RX ADMIN — INSULIN LISPRO SCH UNITS: 100 INJECTION, SUSPENSION SUBCUTANEOUS at 08:00

## 2019-07-05 RX ADMIN — CALCIUM CARBONATE-CHOLECALCIFEROL TAB 250 MG-125 UNIT SCH TAB: 250-125 TAB at 08:12

## 2019-07-05 RX ADMIN — INSULIN LISPRO SCH UNITS: 100 INJECTION, SUSPENSION SUBCUTANEOUS at 17:42

## 2019-07-05 RX ADMIN — AMOXICILLIN AND CLAVULANATE POTASSIUM SCH TAB: 875; 125 TABLET, FILM COATED ORAL at 08:07

## 2019-07-05 RX ADMIN — ISOSORBIDE MONONITRATE SCH MG: 60 TABLET, FILM COATED, EXTENDED RELEASE ORAL at 17:09

## 2019-07-05 NOTE — PCM.SN
- Free Text/Narrative


Note: 





Patient's regular provider, MATT Larsen at the [facility], 

is out of the office today. Nursing home staff has requested that I review 

patient's recent blood work. Note persistent moderate hyperkalemia despite 

previous Kayexalate therapy and discontinuation of spironolactone. Additional 

high-dose Kayexalate will be given today with her previous lisinopril therapy 

to be decreased to a daily rather than twice a day basis. The patient's BUN and 

creatinine have improved since decrease of her Lasix, however her blood 

pressures continue to be under moderately poor control. In addition, note mild 

persistent bradycardia. Initiate additional hydralazine therapy this morning 

with repeat blood work scheduled for 7/8. Otherwise patient clinically stable 

per nursing home staff.

## 2019-07-06 LAB
CHLORIDE SERPL-SCNC: 104 MMOL/L (ref 98–107)
SODIUM SERPL-SCNC: 141 MMOL/L (ref 136–145)

## 2019-07-06 RX ADMIN — INSULIN LISPRO SCH UNITS: 100 INJECTION, SUSPENSION SUBCUTANEOUS at 17:13

## 2019-07-06 RX ADMIN — CALCIUM CARBONATE-CHOLECALCIFEROL TAB 250 MG-125 UNIT SCH TAB: 250-125 TAB at 07:43

## 2019-07-06 RX ADMIN — INSULIN LISPRO SCH UNITS: 100 INJECTION, SUSPENSION SUBCUTANEOUS at 07:39

## 2019-07-06 RX ADMIN — OMEPRAZOLE SCH MG: 20 CAPSULE, DELAYED RELEASE ORAL at 07:42

## 2019-07-06 RX ADMIN — INSULIN LISPRO SCH UNITS: 100 INJECTION, SUSPENSION SUBCUTANEOUS at 07:38

## 2019-07-06 RX ADMIN — ISOSORBIDE MONONITRATE SCH MG: 60 TABLET, FILM COATED, EXTENDED RELEASE ORAL at 17:10

## 2019-07-06 RX ADMIN — INSULIN LISPRO SCH UNITS: 100 INJECTION, SUSPENSION SUBCUTANEOUS at 17:14

## 2019-07-06 NOTE — [id number].DCSUM1
**Discharge Summary





- Hospital Course


Brief History: Patient admitted from inpatient bed to swing bed after 

evaluation of dizziness, uncontrolled diabetes, hyperkalemia, low mag/sodium, 

and other chronic medical conditions.  Has history of cognitive impairment with 

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 It is felt that this impairment has 

affected her ability to take care of herself/take medications safely and led to 

this situation. Patient says that she also cannot afford her meds.  Family 

observed this decline when the patient lived alone in [place] and only 

recently convinced the patient to move into an apartment locally so that they 

could keep track of her more closely.


Diagnosis: Stroke: No





- Discharge Data


Discharge Date: 07/06/19


Discharge Disposition: DC/Tfer to Acute Hospital 02


Condition: Good





- Discharge Diagnosis/Problem(s)


(1) Dizziness


SNOMED Code(s): 971172106, 773972665


   ICD Code: R42 - DIZZINESS AND GIDDINESS   Status: Acute   Priority: High   

Current Visit: No   Problem Details: History of intermittent light headedness 

that has persisted since admission.  Also falls.  Has PRN Meclizine at home but 

has not been taking it.  Had hyponatremia and low magnesium at time of 

admission in addition to significantly elevated blood sugars.  These have been 

greatly improved but without correlated improvement in this complaint.    





(2) Hyperkalemia


SNOMED Code(s): 47244289


   ICD Code: E87.5 - HYPERKALEMIA   Status: Acute   Priority: Medium   Current 

Visit: Yes   Onset Date: 06/18/19   Problem Details: Mild persistent 

hyperkalemia despite previous increased Lasix therapy after increase of her 

previous spironolactone. Has received Kayexalate periodically to help with 

elevated levels.  5.8 yesterday.  5.1 today after Kayexalate.  


6.6 was highest level on 6/27


Lisinopril placed on hold yesterday.    





(3) Hypertension


SNOMED Code(s): 90830506


   ICD Code: I10 - ESSENTIAL (PRIMARY) HYPERTENSION   Status: Chronic   Priority

: Medium   Current Visit: No   Problem Details: Blood pressures improved 

overall but continue to have wide swings between normotensive levels and levels 

where systolic BP remains in the 180s.  Lisinopril currently on hold due to 

persistent issues with hyperkalemia.    


Qualifiers: 


   Hypertension type: essential hypertension   Qualified Code(s): I10 - 

Essential (primary) hypertension   





(4) Dementia


SNOMED Code(s): 63598407


   ICD Code: F03.90 - UNSPECIFIED DEMENTIA WITHOUT BEHAVIORAL DISTURBANCE   

Status: Chronic   Current Visit: No   Problem Details: History of frontal-

temporal dementia with previous CT reports mentioning vascular changes 

reflective of Pick's disease. Appears to be affecting the patient's ability to 

live safely on own and take medications for her chronic medical issues such as 

diabetes.    


Qualifiers: 


   Dementia type: Pick's disease   Dementia behavioral disturbance: without 

behavioral disturbance   Qualified Code(s): G31.01 - Pick's disease; F02.80 - 

Dementia in other diseases classified elsewhere without behavioral disturbance 

  





(5) Renal insufficiency


SNOMED Code(s): 840529101, 629156258


   ICD Code: N28.9 - DISORDER OF KIDNEY AND URETER, UNSPECIFIED   Status: 

Chronic   Priority: Medium   Current Visit: Yes   Problem Details: Mildly 

progressive diabetic nephropathy noted.    





(6) Hypomagnesemia


SNOMED Code(s): 351285304


   ICD Code: E83.42 - HYPOMAGNESEMIA   Status: Acute   Priority: Medium   

Current Visit: Yes   Problem Details: Returned hypomagnesemia on 6/17, which 

was somewhat progressive on 6/18 secondary to IV Lasix therapy. Further 

increase of her magnesium oxide supplementation on 6/18.   





(7) CAD (coronary artery disease)


SNOMED Code(s): 76331796


   ICD Code: I25.10 - ATHSCL HEART DISEASE OF NATIVE CORONARY ARTERY W/O ANG 

PCTRS   Status: Chronic   Priority: Medium   Current Visit: Yes   Problem 

Details: No chest pain or anginal type symptoms BNP is improved at this time. 

Continue current Imdur therapy. Seroquel was discontinued on 6/16 secondary to 

its CHF effects.    


Qualifiers: 


   Coronary Disease-Associated Artery/Lesion type: native artery   Native vs. 

transplanted heart: native heart   Associated angina: without angina   

Qualified Code(s): I25.10 - Atherosclerotic heart disease of native coronary 

artery without angina pectoris   





(8) CHF (congestive heart failure)


SNOMED Code(s): 63199403


   ICD Code: I50.9 - HEART FAILURE, UNSPECIFIED   Status: Chronic   Priority: 

Medium   Current Visit: Yes   Problem Details: As above. Continue medication 

adjustments as above. Uncertain as to when last evaluation/echo performed. Note 

current FULL CODE STATUS. Consider echocardiogram on an outpatient basis after 

her medications have been adjusted depending on her family's wishes.   


Qualifiers: 


   Heart failure type: unspecified   Heart failure chronicity: chronic   

Qualified Code(s): I50.9 - Heart failure, unspecified   





(9) COPD (chronic obstructive pulmonary disease)


SNOMED Code(s): 72065580


   ICD Code: J44.9 - CHRONIC OBSTRUCTIVE PULMONARY DISEASE, UNSPECIFIED   Status

: Chronic   Priority: Medium   Current Visit: Yes   Problem Details: Mild 

persistent but stable nonspecific oral mucous without cough, wheezing, dyspnea, 

etc.  Note history of sleep apnea with patient not currently using a CPAP   


Qualifiers: 


   COPD type: emphysema   Emphysema type: panlobular   Qualified Code(s): J43.1 

- Panlobular emphysema   





(10) Sleep apnea


SNOMED Code(s): 75629296


   ICD Code: G47.30 - SLEEP APNEA, UNSPECIFIED   Status: Chronic   Priority: 

Medium   Current Visit: Yes   Problem Details: As above   


Qualifiers: 


 





(11) Chronic back pain


SNOMED Code(s): 104016434


   ICD Code: M54.9 - DORSALGIA, UNSPECIFIED; G89.29 - OTHER CHRONIC PAIN   

Status: Chronic   Priority: Low   Current Visit: No   Problem Details: History 

of chronic upper and lower back pain   





(12) Depression with anxiety


SNOMED Code(s): 461884230


   ICD Code: F41.8 - OTHER SPECIFIED ANXIETY DISORDERS   Status: Chronic   

Priority: Low   Current Visit: No   Problem Details: observe   





(13) Hyperlipidemia


SNOMED Code(s): 44147868


   ICD Code: E78.5 - HYPERLIPIDEMIA, UNSPECIFIED   Status: Chronic   Priority: 

Medium   Current Visit: No   Problem Details: Patient did have nonspecific 

intolerance to Zocor in the past. She did tolerate Mevacor well, which was 

initiated on 6/16, with additional aggressive coenzyme Q10 therapy.  

Significant hypertriglyceridemia secondary to her uncontrolled IDDM. Continue 

further medication and dietary adjustments. Weight loss in moderation 

advisable. Repeat lipid panel in about 3 months.   


Qualifiers: 


   Hyperlipidemia type: mixed hyperlipidemia   Qualified Code(s): E78.2 - Mixed 

hyperlipidemia   





(14) Hyponatremia


SNOMED Code(s): 02529297


   ICD Code: E87.1 - HYPO-OSMOLALITY AND HYPONATREMIA   Status: Chronic   

Priority: Medium   Current Visit: No   Problem Details: Discontinued IV fluids, 

which were initiated during early phases of this hospitalization, on 6/16. 

Sodium improved at this time with CHF as a probable component. Chronic issues 

with low sodium.  She was previously supposed to be taking daily oral 

supplement.    





(15) IDDM (insulin dependent diabetes mellitus)


SNOMED Code(s): 49841466


   ICD Code: E11.9 - TYPE 2 DIABETES MELLITUS WITHOUT COMPLICATIONS; Z79.4 - 

LONG TERM (CURRENT) USE OF INSULIN   Status: Chronic   Priority: High   Current 

Visit: No   Problem Details: Patient admitted to not taking any insulin or 

performing accuchecks prior to admission. Not following diabetic diet 

recommendations. Blood sugars 400-500 prior to current admission. 


Since admssion, Accu-Cheks continued to improve slowly. Patient's diabetes is 

under extremely poor control with glycosylated hemoglobin of 12.5 on admission. 

She also has significant hypertriglyceridemia with sliding scale in effect, 

however further adjustments required during this swing bed care. Change to 

Humalog mix preparations secondary to finances, etc.. Home health has not been 

effective in the past secondary to patient's confusion and medication 

noncompliance. Note history of diabetic nephropathy and neuropathy.    





(16) Neuropathy


SNOMED Code(s): 419254015


   ICD Code: G62.9 - POLYNEUROPATHY, UNSPECIFIED   Status: Chronic   Priority: 

Low   Current Visit: No   Problem Details: stable per patient   





(17) Noncompliance


SNOMED Code(s): 5356187


   ICD Code: Z91.19 - PATIENT'S NONCOMPLIANCE W OTH MEDICAL TREATMENT AND 

REGIMEN   Status: Chronic   Priority: High   Current Visit: No   Problem Details

: Chronic issues with noncompliance noted over several years when paperwork 

sent from [facility] reviewed. Suspect part of this could be due to patient's Pick 

Disease and is worsening with progressing memory impairment. Patient says that 

she felt as though she was on too much medication and that the meds were making 

her feel poorly and this is why she would skip taking it. Also mentions being 

unable to qualify for assistance with costs, citing specifically her insulin as 

being too expensive for her to afford.  Family would like patient placed in a 

facility where meds are monitored and given at set times.    





(18) Anemia


SNOMED Code(s): 449114397


   ICD Code: D64.9 - ANEMIA, UNSPECIFIED   Status: Chronic   Priority: Low   

Current Visit: Yes   Problem Details: Pattern of iron studies suggests anemia 

of chronic disease. Fe supplementation initiated earlier in course of stay.    


Qualifiers: 


   Other causes of anemia: chronic disease, other 





- Patient Summary/Data


Consults: 


 Consultations





06/18/19 10:14


Consult to Case Management/ [CONS] Routine 


OT Evaluation and Treatment [CONS] Routine 


PT Evaluation and Treatment [CONS] Routine 





06/25/19 10:02


Consult to Dietary [Consult to Dietician] [CONS] Routine 











Hospital Course: 





Patient was placed on Swing Bed after acute admission stay in order to receive 

PT and OT and continued observation of her multiple medical issues.  

Additionally, it was also the intent to try to see if she could qualify for 

assisted living  or other type of placement as her cognitive impairment affects 

her judgement and ability to take her medications appropriately.  Family feels 

that pt will be right back to where she started if she is allowed to return 

home alone, even if home health is involved as they are not around to make 

certain patient actually takes her meds. Patient has failed home health in the 

past. 


It has been noted that overall her balance issues and complaint of being light 

headed have not improved, nor has her persistent hyperkalemia. BPs continued to 

swing somewhat widely.  Given lack of improvement, it was felt that she would 

benefit from being evaluated at [facility] where Internal Medicine and Neuro are 

available to see the patient.  Patient discussed with , Hospitalist 

from [facility]. He accepted the patient for transfer to their facility for 

further evaluation and testing as needed. 





- Discharge Plan


*PRESCRIPTION DRUG MONITORING PROGRAM REVIEWED*: Not Applicable


*COPY OF PRESCRIPTION DRUG MONITORING REPORT IN PATIENT ABDIEL: Not Applicable


Home Medications: 


 Home Meds





Acetaminophen [Acetaminophen Extra Strength] 1,000 mg PO Q4HR 06/14/19 [History]


Acetaminophen with Codeine [Acetaminophen-Cod #3] 1 tab PO BID PRN 06/14/19 [

History]


Aspirin [Ecotrin EC] 81 mg PO DAILY 06/14/19 [History]


Calcium Carbonate/Vitamin D3 [Calcium 600-Vit D3 800 Tab] 1 tab PO DAILY 06/14/ 19 [History]


Carvedilol 25 mg PO BIDAC 06/14/19 [History]


Citalopram [Citalopram HBr] 20 mg PO DAILY 06/14/19 [History]


Enalapril [Vasotec] 20 mg PO BID 06/14/19 [History]


Ferrous Gluconate 324 mg PO DAILY 06/14/19 [History]


Insulin Aspart [NovoLOG] 0 unit SUBCUT TID PRN 06/14/19 [History]


Insulin Glarg,Human.Rec.Analog [Lantus] 30 unit SUBCUT BEDTIME 06/14/19 [History

]


Lancets 1 each MC ASDIRECTED 06/14/19 [History]


Meclizine [Antivert] 25 mg PO Q4HR PRN 06/14/19 [History]


Omega-3 Fatty Acids/DHA/EPA [Ovega-3 Softgel] 1 each PO DAILY 06/14/19 [History]


QUEtiapine [SEROquel] 12.5 mg PO DAILY 06/14/19 [History]


Ranitidine [Zantac] 150 mg PO BID PRN 06/14/19 [History]


Sennosides/Docusate Sodium [Senna Plus Tablet] 1 tab PO BID 06/14/19 [History]


Sodium Chloride 1 gm PO DAILY 06/14/19 [History]


Spironolactone [Aldactone] 12.5 mg PO DAILY 06/14/19 [History]


amLODIPine [Norvasc] 10 mg PO DAILY 06/14/19 [History]


cloNIDine HCl [Clonidine HCl ER] 0.1 mg PO BID 06/14/19 [History]


metOLazone [Metolazone] 2.5 mg PO MOTH@08 06/14/19 [History]








Patient Handouts:  Diabetes Mellitus and Nutrition, Adult





- Discharge Summary/Plan Comment


DC Time >30 min.: No (family member will be driving the patient to [facility].)





- General Info


Date of Service: 07/06/19


Admission Dx/Problem (Free Text: 





1.  CHF


2.  Coronary artery disease


3.  IDDM


4.  Hypertension


5.  Confusion


Subjective Update: 





Continues to have intermittent dizziness/lightheadedness. Currently no other 

acute complaints. 


Functional Status: Reports: Pain Controlled, Tolerating Diet, Ambulating, 

Urinating.  Denies: New Symptoms





- Review of Systems


General: Denies: Fever, Malaise, Chills, Night Sweats


HEENT: Denies: Headaches, Visual Changes


Pulmonary: Reports: Cough (mild, persistent, present since admission).  Denies: 

Shortness of Breath, Pleuritic Chest Pain, Sputum, Hemoptysis, Wheezing


Cardiovascular: Reports: Lightheadedness.  Denies: Chest Pain, Palpitations, 

Dyspnea on Exertion, Orthopnea


Gastrointestinal: Denies: Abdominal Pain, Diarrhea, Nausea, Vomiting


Genitourinary: Reports: No Symptoms


Musculoskeletal: Reports: Other (no acute changes from usual baseline)


Skin: Reports: No Symptoms


Neurological: Reports: Dizziness, Other (history of neuropathy, unchanged).  

Denies: Headache, Trouble Speaking, Change in Speech





- Patient Data


Vitals - Most Recent: 


 Last Vital Signs











Temp  37.4 C   07/05/19 20:00


 


Pulse  60   07/06/19 08:00


 


Resp  20   07/06/19 08:00


 


BP  184/66 H  07/06/19 08:00


 


Pulse Ox  94 L  07/06/19 08:00











Weight - Most Recent: 90.174 kg


Lab Results - Last 24 hrs: 


 Laboratory Results - last 24 hr











  07/05/19 07/05/19 07/06/19 Range/Units





  07:52 17:05 07:34 


 


WBC     (4.0-10.2)  K/uL


 


RBC     (3.77-5.09)  M/uL


 


Hgb     (11.7-15.5)  g/dL


 


Hct     (34.0-46.0)  %


 


MCV     (84.0-98.0)  fL


 


MCH     (28.2-33.3)  pg


 


MCHC     (31.7-36.0)  g/dL


 


RDW     (11.2-14.1)  %


 


Plt Count     (150-350)  K/uL


 


Neut % (Auto)     (45.0-80.0)  %


 


Lymph % (Auto)     (10.0-50.0)  %


 


Mono % (Auto)     (2.0-14.0)  %


 


Eos % (Auto)     (0.0-5.0)  %


 


Baso % (Auto)     (0.0-2.0)  %


 


Neut # (Auto)     (1.40-7.00)  K/uL


 


Lymph # (Auto)     (0.50-3.50)  K/uL


 


Mono # (Auto)     (0.00-1.00)  K/uL


 


Eos # (Auto)     (0.00-0.50)  K/uL


 


Baso # (Auto)     (0.00-0.20)  K/uL


 


Sodium     (136-145)  mmol/L


 


Potassium     (3.5-5.1)  mmol/L


 


Chloride     ()  mmol/L


 


Carbon Dioxide     (21.0-32.0)  mmol/L


 


BUN     (7-18)  mg/dL


 


Creatinine     (0.51-1.17)  mg/dL


 


Est Cr Clr Drug Dosing     mL/min


 


Estimated GFR (MDRD)     mL/min


 


Glucose     ()  mg/dL


 


POC Glucose  155 H  163 H  168 H  ()  mg/dl


 


Calcium     (8.5-10.1)  mg/dL


 


Total Bilirubin     (0.2-1.0)  mg/dL


 


AST     (15-37)  U/L


 


ALT     (12-78)  U/L


 


Alkaline Phosphatase     ()  IU/L


 


Total Protein     (6.4-8.2)  g/dL


 


Albumin     (3.4-5.0)  g/dL














  07/06/19 07/06/19 Range/Units





  13:50 13:50 


 


WBC  11.3 H   (4.0-10.2)  K/uL


 


RBC  3.65 L   (3.77-5.09)  M/uL


 


Hgb  10.4 L   (11.7-15.5)  g/dL


 


Hct  32.0 L   (34.0-46.0)  %


 


MCV  87.7   (84.0-98.0)  fL


 


MCH  28.5   (28.2-33.3)  pg


 


MCHC  32.5   (31.7-36.0)  g/dL


 


RDW  13.8   (11.2-14.1)  %


 


Plt Count  188   (150-350)  K/uL


 


Neut % (Auto)  63.1   (45.0-80.0)  %


 


Lymph % (Auto)  28.4   (10.0-50.0)  %


 


Mono % (Auto)  5.7   (2.0-14.0)  %


 


Eos % (Auto)  2.4   (0.0-5.0)  %


 


Baso % (Auto)  0.4   (0.0-2.0)  %


 


Neut # (Auto)  7.10 H   (1.40-7.00)  K/uL


 


Lymph # (Auto)  3.20   (0.50-3.50)  K/uL


 


Mono # (Auto)  0.64   (0.00-1.00)  K/uL


 


Eos # (Auto)  0.27   (0.00-0.50)  K/uL


 


Baso # (Auto)  0.04   (0.00-0.20)  K/uL


 


Sodium   141  (136-145)  mmol/L


 


Potassium   5.1  (3.5-5.1)  mmol/L


 


Chloride   104  ()  mmol/L


 


Carbon Dioxide   25.4  (21.0-32.0)  mmol/L


 


BUN   51 H  (7-18)  mg/dL


 


Creatinine   1.21 H  (0.51-1.17)  mg/dL


 


Est Cr Clr Drug Dosing   33.22  mL/min


 


Estimated GFR (MDRD)   43  mL/min


 


Glucose   201 H  ()  mg/dL


 


POC Glucose    ()  mg/dl


 


Calcium   8.9  (8.5-10.1)  mg/dL


 


Total Bilirubin   0.3  (0.2-1.0)  mg/dL


 


AST   24  (15-37)  U/L


 


ALT   18  (12-78)  U/L


 


Alkaline Phosphatase   77  ()  IU/L


 


Total Protein   6.3 L  (6.4-8.2)  g/dL


 


Albumin   3.3 L  (3.4-5.0)  g/dL











Med Orders - Current: 


 Current Medications





Acetaminophen (Tylenol Extra Strength)  1,000 mg PO Q6H PRN


   PRN Reason: Pain/Fever


   Last Admin: 07/05/19 23:36 Dose:  1,000 mg


Amlodipine Besylate (Norvasc)  10 mg PO DAILY [facility]


   Last Admin: 07/06/19 07:41 Dose:  10 mg


Aspirin (Halfprin)  81 mg PO DAILY [facility]


   Last Admin: 07/06/19 07:41 Dose:  81 mg


Calcium Carbonate (Oystcal-D 625 Mg-125 Units)  1 tab PO DAILY [facility]


   Last Admin: 07/06/19 07:43 Dose:  1 tab


[facility]


   Last Admin: 07/06/19 07:43 Dose:  25 mg


Citalopram Hydrobromide (Celexa)  20 mg PO DAILY [facility]


   Last Admin: 07/06/19 07:41 Dose:  20 mg


Clonidine HCl (Catapres)  0.1 mg PO BID [facility]


   Last Admin: 07/06/19 07:43 Dose:  0.1 mg


Coenzyme Q10 (Coenzyme Q10)  100 mg PO TID [facility]


   Last Admin: 07/06/19 12:01 Dose:  100 mg


Ferrous Sulfate (Ferrous Sulfate)  325 mg PO BID [facility]


   Last Admin: 07/06/19 07:42 Dose:  325 mg


Furosemide (Lasix)  40 mg PO DAILY [facility]


   Last Admin: 07/06/19 07:42 Dose:  40 mg


Furosemide (Lasix)  20 mg PO DAILY@1200 [facility]


   Last Admin: 07/06/19 12:02 Dose:  20 mg


Guaifenesin (Mucinex)  600 mg PO BID [facility]


   Last Admin: 07/06/19 07:42 Dose:  600 mg


Hydralazine HCl (Apresoline)  50 mg PO BID [facility]


   Last Admin: 07/06/19 07:42 Dose:  50 mg


Insulin Lispro Protam/Lispro Human (Humalog Mix 75-25)  0 unit SUBCUT BIDAC [facility]

; Protocol


   Last Admin: 07/06/19 07:38 Dose:  2 units


Insulin Lispro Protam/Lispro Human (Humalog Mix 75-25)  30 unit SUBCUT DAILY@

0730 [facility]


   Last Admin: 07/06/19 07:39 Dose:  30 units


Insulin Lispro Protam/Lispro Human (Humalog Mix 75-25)  24 unit SUBCUT QPM@1730 

[facility]


   Last Admin: 07/05/19 17:42 Dose:  24 units


Isosorbide Mononitrate (Imdur)  90 mg PO QPM [facility]


   Last Admin: 07/05/19 17:09 Dose:  90 mg


Lisinopril (Prinivil)  20 mg PO DAILY [facility]


   Last Admin: 07/06/19 07:42 Dose:  20 mg


Lovastatin (Mevacor)  20 mg PO DAILY@1700 [facility]


   Last Admin: 07/05/19 17:07 Dose:  20 mg


Magnesium Hydroxide (Milk Of Magnesia)  30 ml PO DAILY PRN


   PRN Reason: Constipation


   Last Admin: 07/03/19 10:06 Dose:  30 ml


Magnesium Oxide (Magnesium Oxide)  400 mg PO TID [facility]


   Last Admin: 07/06/19 12:01 Dose:  400 mg


Omeprazole (Omeprazole)  20 mg PO DAILY [facility]


   Last Admin: 07/06/19 07:42 Dose:  20 mg


Senna/Docusate Sodium (Senna Plus)  1 tab PO BID [facility]


   Last Admin: 07/06/19 07:43 Dose:  1 tab


Sodium Chloride (Saline Flush)  10 ml FLUSH ASDIRECTED PRN


   PRN Reason: PER PROTOCOL


   Last Admin: 07/02/19 02:27 Dose:  10 ml





Discontinued Medications





Amoxicillin/Clavulanate Potassium (Augmentin 875 Mg/125 Mg)  1 tab PO Q12HR [facility]


   Stop: 07/05/19 08:00


   Last Admin: 07/05/19 08:07 Dose:  1 tab


Bisacodyl (Dulcolax)  10 mg RECTAL ONETIME ONE


   Stop: 07/02/19 10:09


   Last Admin: 07/02/19 11:03 Dose:  Not Given


Cephalexin (Keflex)  500 mg PO QID [facility]


   Stop: 06/25/19 16:01


   Last Admin: 06/25/19 17:34 Dose:  500 mg


Clonidine HCl (Catapres)  0.1 mg PO QPM [facility]


   Last Admin: 06/26/19 17:30 Dose:  0.1 mg


Enalapril Maleate (Vasotec)  20 mg PO BID [facility]


   Last Admin: 06/25/19 08:54 Dose:  20 mg


Furosemide (Lasix)  40 mg PO DAILY [facility]


   Last Admin: 06/27/19 08:30 Dose:  40 mg


Furosemide (Lasix)  20 mg PO DAILY@1200 [facility]


   Last Admin: 06/19/19 11:03 Dose:  20 mg


Furosemide (Lasix)  40 mg IVPUSH Q8H [facility]


   Last Admin: 06/28/19 10:31 Dose:  40 mg


Furosemide (Lasix)  20 mg IVPUSH Q8H [facility]


Furosemide (Lasix)  20 mg IVPUSH Q8H [facility]


   Last Admin: 07/02/19 11:11 Dose:  Not Given


Furosemide (Lasix)  20 mg PO DAILY [facility]


   Last Admin: 07/03/19 09:03 Dose:  Not Given


Insulin Lispro Protam/Lispro Human (Humalog Mix 75-25)  30 unit SUBCUT BIDAC [facility]


   Last Admin: 06/18/19 17:34 Dose:  30 units


Isosorbide Mononitrate (Imdur)  60 mg PO QPM [facility]


   Last Admin: 06/26/19 17:33 Dose:  60 mg


Lisinopril (Prinivil)  20 mg PO BID [facility]


   Last Admin: 07/05/19 08:13 Dose:  20 mg


Magnesium Oxide (Magnesium Oxide)  400 mg PO BID [facility]


Magnesium Oxide (Magnesium Oxide)  800 mg PO BID [facility]


   Last Admin: 06/28/19 08:52 Dose:  800 mg


Senna/Docusate Sodium (Senna Plus)  2 tab PO ONETIME ONE


   Stop: 07/02/19 11:43


   Last Admin: 07/02/19 12:36 Dose:  2 tab


Sodium Chloride (Saline Flush)  10 ml FLUSH Q12HR [facility]


   Last Admin: 07/02/19 20:22 Dose:  Not Given


Sodium Polystyrene Sulfonate (Kayexalate)  15 gm PO ONETIME ONE


   Stop: 06/27/19 08:49


   Last Admin: 06/27/19 10:39 Dose:  15 gm


Sodium Polystyrene Sulfonate (Kayexalate)  15 gm PO ONETIME ONE


   Stop: 06/28/19 11:01


   Last Admin: 06/28/19 11:27 Dose:  15 gm


Sodium Polystyrene Sulfonate (Kayexalate)  15 gm PO ONETIME ONE


   Stop: 07/02/19 10:10


   Last Admin: 07/02/19 10:56 Dose:  15 gm


Sodium Polystyrene Sulfonate (Kayexalate)  45 gm PO NOW ONE


   Stop: 07/05/19 10:06


   Last Admin: 07/05/19 11:15 Dose:  45 gm


Spironolactone (Aldactone)  25 mg PO BID [facility]


   Last Admin: 06/20/19 07:30 Dose:  25 mg


Spironolactone (Aldactone)  12.5 mg PO BID [facility]


   Last Admin: 06/27/19 08:27 Dose:  12.5 mg


Temazepam (Restoril)  15 mg PO BEDTIME PRN


   PRN Reason: Insomnia


   Last Admin: 06/18/19 23:57 Dose:  15 mg











- Exam


General: Reports: Alert, Cooperative, No Acute Distress, Other (cannot say year/

month)


HEENT: Reports: Pupils Equal, Pupils Reactive, EOMI, Mucous Membr. Moist/Pink


Neck: Reports: Supple


Lungs: Reports: Clear to Auscultation, Normal Respiratory Effort


Cardiovascular: Reports: Regular Rate, Tachycardia


GI/Abdominal Exam: Normal Bowel Sounds, Soft, Non-Tender, No Distention


 (Female) Exam: Deferred


Rectal (Female) Exam: Deferred


Back Exam: Denies: CVA Tenderness (L), CVA Tenderness (R), Muscle Spasm, 

Paraspinal Tenderness, Vertebral Tenderness


Extremities: Non-Tender, Normal Capillary Refill


Skin: Reports: Warm, Dry


Neurological: Reports: No New Focal Deficit


Psy/Mental Status: Reports: Alert, Normal Affect, Normal Mood

## 2019-07-18 ENCOUNTER — HOSPITAL ENCOUNTER (INPATIENT)
Dept: HOSPITAL 77 - KA.MS | Age: 74
LOS: 2 days | Discharge: NURSING, REHAB OR LONG-TERM CARE | DRG: 683 | End: 2019-07-20
Attending: FAMILY MEDICINE | Admitting: NURSE PRACTITIONER
Payer: MEDICARE

## 2019-07-18 DIAGNOSIS — F41.9: ICD-10-CM

## 2019-07-18 DIAGNOSIS — Z79.899: ICD-10-CM

## 2019-07-18 DIAGNOSIS — G89.29: ICD-10-CM

## 2019-07-18 DIAGNOSIS — E78.5: ICD-10-CM

## 2019-07-18 DIAGNOSIS — H40.9: ICD-10-CM

## 2019-07-18 DIAGNOSIS — G31.01: ICD-10-CM

## 2019-07-18 DIAGNOSIS — E86.0: ICD-10-CM

## 2019-07-18 DIAGNOSIS — N17.9: Primary | ICD-10-CM

## 2019-07-18 DIAGNOSIS — Z98.42: ICD-10-CM

## 2019-07-18 DIAGNOSIS — K59.00: ICD-10-CM

## 2019-07-18 DIAGNOSIS — E87.5: ICD-10-CM

## 2019-07-18 DIAGNOSIS — Z98.41: ICD-10-CM

## 2019-07-18 DIAGNOSIS — E66.9: ICD-10-CM

## 2019-07-18 DIAGNOSIS — E11.22: ICD-10-CM

## 2019-07-18 DIAGNOSIS — Z90.89: ICD-10-CM

## 2019-07-18 DIAGNOSIS — I25.10: ICD-10-CM

## 2019-07-18 DIAGNOSIS — G47.33: ICD-10-CM

## 2019-07-18 DIAGNOSIS — N18.3: ICD-10-CM

## 2019-07-18 DIAGNOSIS — I13.0: ICD-10-CM

## 2019-07-18 DIAGNOSIS — K31.89: ICD-10-CM

## 2019-07-18 DIAGNOSIS — J44.9: ICD-10-CM

## 2019-07-18 DIAGNOSIS — M54.6: ICD-10-CM

## 2019-07-18 DIAGNOSIS — F32.9: ICD-10-CM

## 2019-07-18 DIAGNOSIS — I50.32: ICD-10-CM

## 2019-07-18 DIAGNOSIS — F02.80: ICD-10-CM

## 2019-07-18 LAB
ANION GAP SERPL CALC-SCNC: 14.2 MMOL/L (ref 5–15)
CHLORIDE SERPL-SCNC: 99 MMOL/L (ref 98–115)
SODIUM SERPL-SCNC: 136 MMOL/L (ref 136–145)

## 2019-07-18 RX ADMIN — ALUMINUM HYDROXIDE, MAGNESIUM HYDROXIDE, AND SIMETHICONE PRN ML: 200; 200; 20 SUSPENSION ORAL at 20:19

## 2019-07-18 NOTE — CR
______________________________________________________________________________   

  

[id number] RAD/RAD Abd Flat and Upright 2V  

Exam:   

   

 RAD Abd Flat and Upright 2V  

   

 Clinical Data:   

   

 ABDOMINAL PAIN  

   

 COMPARISON:   

   

 NO PREVIOUS SIMILAR EXAM IS AVAILABLE  

   

 FINDINGS:  

   

 There is significant gastric distention.  

   

 There is moderate small bowel distention.  

   

 There is no free air.  

   

 There are surgical changes.  

   

 There is no organomegaly.   

   

 IMPRESSION:  

   

 MODERATE BOWEL DISTENTION.  

   

 SIGNIFICANT GASTRIC DISTENTION.  

   

 CONSIDER CAT SCAN IF NEEDED.  

   

 Electronically signed by Kody Hirsch MD on 7/18/2019 5:19 PM  

   

  

Kody Hirsch MD                 

 07/18/19 2520    

  

Thank you for allowing us to participate in the care of your patient.

## 2019-07-19 LAB
ANION GAP SERPL CALC-SCNC: 13 MMOL/L (ref 5–15)
CHLORIDE SERPL-SCNC: 107 MMOL/L (ref 98–115)
SODIUM SERPL-SCNC: 139 MMOL/L (ref 136–145)

## 2019-07-19 RX ADMIN — ALUMINUM HYDROXIDE, MAGNESIUM HYDROXIDE, AND SIMETHICONE PRN ML: 200; 200; 20 SUSPENSION ORAL at 20:18

## 2019-07-19 NOTE — PCM.PN
- General Info


Date of Service: 07/19/19


Functional Status: Reports: Pain Controlled, Tolerating Diet, Urinating, Other (

Is feeling much better today).  Denies: Ambulating, New Symptoms





- Review of Systems


General: Reports: Weakness


HEENT: Reports: Other (Hard of hearing)


Pulmonary: Reports: No Symptoms


Cardiovascular: Reports: Lightheadedness (Lightheadedness upon standing)


Gastrointestinal: Reports: Constipation (Now having bowel movements).  Denies: 

Diarrhea, Difficulty Swallowing, Nausea, Vomiting


Genitourinary: Reports: No Symptoms


Musculoskeletal: Reports: Foot Pain (Bottom of both feet painful).  Denies: 

Joint Swelling


Skin: Reports: Pallor, Dryness, Other (Cracked skin bottom of both feet)


Neurological: Reports: Difficulty Walking, Weakness





- Patient Data


Vitals - Most Recent: 


 Last Vital Signs











Temp  98.8 F   07/19/19 11:00


 


Pulse  62   07/19/19 11:00


 


Resp  18   07/19/19 11:00


 


BP  136/53 L  07/19/19 11:00


 


Pulse Ox  93 L  07/19/19 11:00











Weight - Most Recent: 195 lb 4.8 oz


I&O - Last 24 Hours: 


 Intake & Output











 07/18/19 07/19/19 07/19/19





 22:59 06:59 14:59


 


Intake Total 1225 503 


 


Output Total  1900 


 


Balance 1225 -1397 











Lab Results Last 24 Hours: 


 Laboratory Results - last 24 hr











  07/18/19 07/18/19 07/19/19 Range/Units





  15:30 15:30 07:30 


 


WBC  13.97 H    (5.00-10.00)  10^3/uL


 


RBC  3.90    (3.80-5.50)  10^6/uL


 


Hgb  11.1 L    (12.0-16.0)  g/dL


 


Hct  34.1 L    (37.0-47.0)  %


 


MCV  87.4    (82.0-92.0)  fL


 


MCH  28.5    (27.0-31.0)  pg


 


MCHC  32.6    (32.0-36.0)  g/dL


 


RDW  13.2    (11.5-14.5)  %


 


Plt Count  206    (150-400)  10^3/uL


 


MPV  8.9    (7.4-10.4)  fL


 


Immature Gran % (Auto)  0.3    (0.0-5.0)  %


 


Neut % (Auto)  64.1    (50.0-70.0)  %


 


Lymph % (Auto)  28.3    (20.0-40.0)  %


 


Mono % (Auto)  4.7    (2.0-8.0)  %


 


Eos % (Auto)  2.4    (1.0-3.0)  %


 


Baso % (Auto)  0.2    (0.0-1.0)  %


 


Immature Gran # (Auto)  0.04    (0.00-0.50)  10^3/uL


 


Neut # (Auto)  8.95 H    (2.50-7.00)  10^3/uL


 


Lymph # (Auto)  3.96    (1.00-4.00)  10^3/uL


 


Mono # (Auto)  0.66    (0.10-0.80)  10^3/uL


 


Eos # (Auto)  0.33 H    (0.10-0.30)  10^3/uL


 


Baso # (Auto)  0.03    (0.00-0.10)  10^3/uL


 


Sodium   136  139  (136-145)  mmol/L


 


Potassium   5.0  5.3  (3.3-5.3)  mmol/L


 


Chloride   99  107  ()  mmol/L


 


Carbon Dioxide   27.8  24.3  (21.0-32.0)  mmol/L


 


Anion Gap   14.2  13.0  (5-15)  mmol/L


 


BUN   93 H*  84 H*  (6-25)  mg/dL


 


Creatinine   2.67 H  1.87 H  (0.51-1.17)  mg/dL


 


Est Cr Clr Drug Dosing   15.06  21.50  mL/min


 


Estimated GFR (MDRD)   17  26  mL/min


 


Glucose   249 H  177 H (75 - 99) mg/dL


 


POC Glucose     ()  mg/dl


 


Calcium   9.8  8.5 L  (8.7-10.3)  mg/dL


 


Total Bilirubin   0.2   (0.2-1.0)  mg/dL


 


AST   12 L   (15-37)  U/L


 


ALT   17   (12-78)  U/L


 


Alkaline Phosphatase   77   ()  IU/L


 


Total Protein   7.2   (6.4-8.2)  g/dL


 


Albumin   3.56   (3.00-4.80)  g/dL














  07/19/19 07/19/19 Range/Units





  07:30 10:03 


 


WBC  12.73 H   (5.00-10.00)  10^3/uL


 


RBC  3.46 L   (3.80-5.50)  10^6/uL


 


Hgb  9.9 L   (12.0-16.0)  g/dL


 


Hct  30.3 L   (37.0-47.0)  %


 


MCV  87.6   (82.0-92.0)  fL


 


MCH  28.6   (27.0-31.0)  pg


 


MCHC  32.7   (32.0-36.0)  g/dL


 


RDW  13.1   (11.5-14.5)  %


 


Plt Count  162   (150-400)  10^3/uL


 


MPV  8.8   (7.4-10.4)  fL


 


Immature Gran % (Auto)  0.1   (0.0-5.0)  %


 


Neut % (Auto)  63.8   (50.0-70.0)  %


 


Lymph % (Auto)  28.3   (20.0-40.0)  %


 


Mono % (Auto)  4.6   (2.0-8.0)  %


 


Eos % (Auto)  3.0   (1.0-3.0)  %


 


Baso % (Auto)  0.2   (0.0-1.0)  %


 


Immature Gran # (Auto)  0.01   (0.00-0.50)  10^3/uL


 


Neut # (Auto)  8.13 H   (2.50-7.00)  10^3/uL


 


Lymph # (Auto)  3.60   (1.00-4.00)  10^3/uL


 


Mono # (Auto)  0.58   (0.10-0.80)  10^3/uL


 


Eos # (Auto)  0.38 H   (0.10-0.30)  10^3/uL


 


Baso # (Auto)  0.03   (0.00-0.10)  10^3/uL


 


Sodium    (136-145)  mmol/L


 


Potassium    (3.3-5.3)  mmol/L


 


Chloride    ()  mmol/L


 


Carbon Dioxide    (21.0-32.0)  mmol/L


 


Anion Gap    (5-15)  mmol/L


 


BUN    (6-25)  mg/dL


 


Creatinine    (0.51-1.17)  mg/dL


 


Est Cr Clr Drug Dosing    mL/min


 


Estimated GFR (MDRD)    mL/min


 


Glucose   (75 - 99) mg/dL


 


POC Glucose   386 H  ()  mg/dl


 


Calcium    (8.7-10.3)  mg/dL


 


Total Bilirubin    (0.2-1.0)  mg/dL


 


AST    (15-37)  U/L


 


ALT    (12-78)  U/L


 


Alkaline Phosphatase    ()  IU/L


 


Total Protein    (6.4-8.2)  g/dL


 


Albumin    (3.00-4.80)  g/dL











Med Orders - Current: 


 Current Medications





Al Hydroxide/Mg Hydroxide (Mag-Al Plus)  30 ml PO QID PRN


   PRN Reason: heartburn/upset stomach


   Last Admin: 07/18/19 20:19 Dose:  30 ml


Aspirin (Aspirin)  81 mg PO DAILY [facility]


   Last Admin: 07/19/19 09:59 Dose:  81 mg


Carvedilol (Coreg)  25 mg PO BIDAC [facility]


   Last Admin: 07/19/19 09:58 Dose:  25 mg


Citalopram Hydrobromide (Celexa)  20 mg PO DAILY [facility]


   Last Admin: 07/19/19 09:59 Dose:  20 mg


Ezetimibe (Zetia)  10 mg PO DAILY [facility]


   Last Admin: 07/19/19 09:58 Dose:  10 mg


Sodium Chloride (Normal Saline)  1,000 mls @ 70 mls/hr IV ASDIRECTED [facility]


   Last Admin: 07/19/19 04:48 Dose:  70 mls/hr


Insulin Glargine (Lantus Solostar)  30 units SUBCUT DAILY [facility]


   Last Admin: 07/19/19 09:59 Dose:  30 units


Latanoprost (Xalatan 0.005% Ophth Soln)  0 ml EYEBOTH BEDTIME [facility]


   Last Admin: 07/18/19 20:20 Dose:  1 drop


Ondansetron HCl (Zofran)  4 mg IV Q4H PRN


   PRN Reason: Nausea/Vomiting


   Last Admin: 07/18/19 15:45 Dose:  4 mg


Quetiapine Fumarate (Seroquel)  12.5 mg PO BEDTIME [facility]


   Last Admin: 07/18/19 20:18 Dose:  12.5 mg


Senna/Docusate Sodium (Senna Plus)  4 tab PO BID [facility]


   Last Admin: 07/19/19 09:59 Dose:  Not Given


Sodium Chloride (Saline Flush)  10 ml FLUSH Q8HR PRN


   PRN Reason: keep vein open


   Last Admin: 07/18/19 15:30 Dose:  10 ml





Discontinued Medications





Insulin Glargine (Lantus Solostar)  40 units SUBCUT DAILY [facility]


Non-Formulary Medication (Insulin Degludec [Tresiba])  30 units SUBCUT DAILY [facility]


   Last Admin: 07/19/19 12:29 Dose:  Not Given


Non-Formulary Medication (Lancets [Lancets])  1 each  TID MATTHIAS











- Exam


Quality Assessment: No: Supplemental Oxygen


General: Alert, Oriented, Cooperative, No Acute Distress


HEENT: Pupils Equal, Pupils Reactive, EOMI, Mucous Membr. Moist/Pink


Neck: Supple


Lungs: Clear to Auscultation, Normal Respiratory Effort


Cardiovascular: Regular Rate, Regular Rhythm, Murmurs


GI/Abdominal Exam: Soft, No Mass, Distended (Distention improved from yesterday)

.  No: Rigid, Rebound, Abnormal Bowel Sounds, Mass


 (Female) Exam: Deferred


Back Exam: No: CVA Tenderness (L), CVA Tenderness (R)


Extremities: No Pedal Edema


Peripheral Pulses: 2+: Radial (R), Femoral (L)


Skin: Other (Dry cracked skin bottom of both feet)


Neurological: No New Focal Deficit


Psy/Mental Status: Alert, Normal Affect, Normal Mood





- Problem List Review


Problem List Initiated/Reviewed/Updated: Yes





- My Orders


Last 24 Hours: 


My Active Orders





07/18/19 15:15


Patient Status [ADT] Routine 


Up to Chair [RC] ASDIRECTED 


Vital Signs [RC] 0300,0700,1100,1500,1900,2300 


Ondansetron [Zofran]   4 mg IV Q4H PRN 


Sodium Chloride 0.9% [Saline Flush]   10 ml FLUSH Q8HR PRN 


Peripheral IV Insertion Adult [OM.PC] Routine 


Resuscitation Status Routine 





07/18/19 15:20


Peripheral IV Care [RC] 0900,2100 





07/18/19 15:30


Sodium Chloride 0.9% [Normal Saline] 1,000 ml IV ASDIRECTED 





07/18/19 19:59


Alum Hydrox/Mag Hydrox/Simeth [Mag-Al Plus]   30 ml PO QID PRN 





07/18/19 21:00


Docusate Sodium/Sennosides [Senna Plus]   4 tab PO BID 


Latanoprost [Xalatan 0.005% Ophth Soln]   0 ml EYEBOTH BEDTIME 


QUEtiapine [SEROquel]   12.5 mg PO BEDTIME 





07/18/19 Dinner


Clear Liquid Diet [DIET] 





07/19/19 07:30


Carvedilol [Coreg]   25 mg PO BIDAC 





07/19/19 09:00


Aspirin   81 mg PO DAILY 


Citalopram [Celexa]   20 mg PO DAILY 


Ezetimibe [Zetia]   10 mg PO DAILY 


Insulin Glarg,Human.Rec.Analog [LantUS Solostar]   30 units SUBCUT DAILY 














- Plan


Plan:: 


History


Debra is a 74-year-old female who was admitted from outpatient clinic yesterday 

by myself Connor Cuellar nurse practitioner due to low blood pressure along with 

dizziness.  Patient was sent from [facility] from [facility] swing 

bed on July 6 due to dizziness, uncontrolled hypertension, uncontrolled blood 

sugar levels along with hyperkalemia.  Patient has a history of CKD, 

hyperlipidemia, COPD, obesity, HENRY, coronary artery disease with congestive 

heart failure and was discharged from [facility] on July 8th and was to 

follow-up with home health.  She was seen by Angel cook PA had labs drawn 

and was to follow-up today.  Patient is hard of hearing.  She is having a 

difficult time managing her medication thereby endocrinology simplified her 

treatment regimen by placing her on Novolin 70/30 15 units BID 30 minutes 

before breakfast and supper. 





Due to her uncontrolled hypertension she was started on  amlodipine 10 mg daily

, carvedilol 25 mg BID, clonidine 0.1 mg BID and metolazone 2.5 mg Monday and 

Thursday.Metolazone was then replaced with lasix 40 mg BID.Pressures remained 

elevatedthroughout the stay and it was recommended that she be managed as 

outpatient for workup of secondary hypertension.  





Patient did have hyperkalemia during swing bed at [facility] Aldactone and Vasotec 

were stopped and potassium normalized over the course of the stay. Potassium was

5.1at discharge and Vasotec was restarted.Patient was advised to follow a 

low sodium, low potassium diet.


  


She is in the clinic today complaining of abdominal distention, constipation, 

nausea and vomiting, dizziness and her blood pressure is low.  Bowel move in 3-

4 days.  Denies any fever but states she is quite dizzy today.


___________________________________________________________





Update on rounds, patient is feeling better, multiple stools throughout the 

night, renal function improving with gentle bolus and IV fluids, improved 

appetite, no more vomiting.  





Primary hospital problems


--Acute kidney injury


--Hypotension


--Dehydration


--T2 DM, uncontrolled


--Constipation, with gastric distention and small bowel distention








Chronic problems


--HFpEF EF 65%


--CKD III, acute injury on 


--CAD


--Hyperlipidemia


--COPD


--Obesity


--HENRY


--Pick's disease


--Depression and anxiety


--Glaucoma


--Iron deficiency anemia











Disposition/overall plan


--Continue inpatient status


--Reduce IV fluids


--SS consult

## 2019-07-20 ENCOUNTER — HOSPITAL ENCOUNTER (INPATIENT)
Dept: HOSPITAL 77 - KA.MS | Age: 74
LOS: 8 days | Discharge: ANOTHER HOSPITAL | DRG: 638 | End: 2019-07-28
Attending: FAMILY MEDICINE | Admitting: NURSE PRACTITIONER
Payer: MEDICARE

## 2019-07-20 DIAGNOSIS — F03.90: ICD-10-CM

## 2019-07-20 DIAGNOSIS — E11.65: Primary | ICD-10-CM

## 2019-07-20 DIAGNOSIS — K59.00: ICD-10-CM

## 2019-07-20 DIAGNOSIS — R53.1: ICD-10-CM

## 2019-07-20 DIAGNOSIS — R91.1: ICD-10-CM

## 2019-07-20 DIAGNOSIS — E66.9: ICD-10-CM

## 2019-07-20 DIAGNOSIS — H91.90: ICD-10-CM

## 2019-07-20 DIAGNOSIS — G89.29: ICD-10-CM

## 2019-07-20 DIAGNOSIS — Z88.8: ICD-10-CM

## 2019-07-20 DIAGNOSIS — I25.10: ICD-10-CM

## 2019-07-20 DIAGNOSIS — E11.40: ICD-10-CM

## 2019-07-20 DIAGNOSIS — I13.0: ICD-10-CM

## 2019-07-20 DIAGNOSIS — H54.7: ICD-10-CM

## 2019-07-20 DIAGNOSIS — N18.9: ICD-10-CM

## 2019-07-20 DIAGNOSIS — E78.5: ICD-10-CM

## 2019-07-20 DIAGNOSIS — Z79.899: ICD-10-CM

## 2019-07-20 DIAGNOSIS — F31.9: ICD-10-CM

## 2019-07-20 DIAGNOSIS — F41.9: ICD-10-CM

## 2019-07-20 DIAGNOSIS — M54.9: ICD-10-CM

## 2019-07-20 DIAGNOSIS — Z79.82: ICD-10-CM

## 2019-07-20 DIAGNOSIS — K21.9: ICD-10-CM

## 2019-07-20 DIAGNOSIS — G47.33: ICD-10-CM

## 2019-07-20 DIAGNOSIS — I50.9: ICD-10-CM

## 2019-07-20 DIAGNOSIS — Z79.4: ICD-10-CM

## 2019-07-20 DIAGNOSIS — H40.9: ICD-10-CM

## 2019-07-20 DIAGNOSIS — E11.22: ICD-10-CM

## 2019-07-20 LAB
ANION GAP SERPL CALC-SCNC: 12.6 MMOL/L (ref 5–15)
CHLORIDE SERPL-SCNC: 110 MMOL/L (ref 98–115)
SODIUM SERPL-SCNC: 143 MMOL/L (ref 136–145)

## 2019-07-20 RX ADMIN — ONDANSETRON PRN MG: 2 INJECTION, SOLUTION INTRAMUSCULAR; INTRAVENOUS at 15:08

## 2019-07-20 RX ADMIN — HUMAN INSULIN SCH UNIT: 100 INJECTION, SOLUTION SUBCUTANEOUS at 07:45

## 2019-07-20 RX ADMIN — HUMAN INSULIN SCH: 100 INJECTION, SOLUTION SUBCUTANEOUS at 08:23

## 2019-07-20 RX ADMIN — INSULIN ASPART SCH UNITS: 100 INJECTION, SUSPENSION SUBCUTANEOUS at 18:26

## 2019-07-20 NOTE — PCM.HP
H&P History of Present Illness





- General


Date of Service: 07/20/19


Admit Problem/Dx: 


 Admission Diagnosis/Problem





Admission Diagnosis/Problem      Weakness of both lower extremities








Source of Information: Patient, Old Records, Provider


History Limitations: Reports: No Limitations





- History of Present Illness


Initial Comments - Free Text/Narative: 





74-year-old female who was admitted from outpatient clinic yesterday by myself 

Connor Cuellar nurse practitioner due to low blood pressure along with 

dizziness.  Patient was sent from [facility] from [place] swing 

bed on July 6 due to dizziness, uncontrolled hypertension, uncontrolled blood 

sugar levels along with hyperkalemia.  Patient has a history of CKD, 

hyperlipidemia, COPD, obesity, HENRY, coronary artery disease with congestive 

heart failure and was discharged from [facility] on July 8th and was to 

follow-up with home health.  She was seen by Angel LAUGHLIN had labs drawn 

and was to follow-up today.  Patient is hard of hearing.  She is having a 

difficult time managing her medication thereby endocrinology simplified her 

treatment regimen by placing her on Novolin 70/30 15 units BID 30 minutes 

before breakfast and supper. 





Due to her uncontrolled hypertension she was started on  amlodipine 10 mg daily

, carvedilol 25 mg BID, clonidine 0.1 mg BID and metolazone 2.5 mg Monday and 

Thursday.Metolazone was then replaced with lasix 40 mg BID.Pressures remained 

elevatedthroughout the stay and it was recommended that she be managed as 

outpatient for workup of secondary hypertension.  





Patient did have hyperkalemia during swing bed at [place] Aldactone and Vasotec 

were stopped and potassium normalized over the course of the stay. Potassium was

5.1at discharge and Vasotec was restarted.Patient was advised to follow a 

low sodium, low potassium diet.





- Related Data


Allergies/Adverse Reactions: 


 Allergies











Allergy/AdvReac Type Severity Reaction Status Date / Time


 


blue dye Allergy  Other Verified 07/18/19 15:47


 


fenofibrate [From Tricor] Allergy  Other Verified 07/18/19 15:47


 


simvastatin Allergy  Other Verified 07/18/19 15:47


 


Statins-Hmg-Coa Reductase Allergy  Cannot Verified 07/18/19 15:50





Inhibitor   Remember  











Home Medications: 


 Home Meds





Acetaminophen with Codeine [Acetaminophen-Cod #3] 1 - 2 tab PO Q6H PRN 06/14/19 

[History]


Carvedilol 25 mg PO BIDAC 06/14/19 [History]


Citalopram [Citalopram HBr] 20 mg PO DAILY 06/14/19 [History]


Enalapril [Vasotec] 20 mg PO BID 06/14/19 [History]


Lancets 1 each MC TID 06/14/19 [History]


Omega-3 Fatty Acids/DHA/EPA [Ovega-3 Softgel] 1,000 mg PO BID 06/14/19 [History]


Sennosides/Docusate Sodium [Senna Plus Tablet] 4 tab PO BID 06/14/19 [History]


amLODIPine [Norvasc] 5 mg PO DAILY 06/14/19 [History]


cloNIDine HCl [Clonidine HCl ER] 0.1 mg PO BID 06/14/19 [History]


Aspirin 81 mg PO DAILY 07/18/19 [History]


Calcium Carbonate/Vitamin D3 [Calcium 600 + Vit D 400 Tablet] 1 each PO 

BIDMEALS 07/18/19 [History]


Ezetimibe 1 tab PO DAILY 07/18/19 [History]


Furosemide 40 mg PO DAILY 07/18/19 [History]


Insulin Degludec [Tresiba] 30 units SUBCUT DAILY 07/18/19 [History]


Iron Polysaccharide Complex [Poly-Iron] 1 cap PO ACBREAKFAST 07/18/19 [History]


Latanoprost 1 drop OP BEDTIME 07/18/19 [History]


Niacinamide [Niacin] 500 mg PO TID 07/18/19 [History]


Nitroglycerin [Nitrostat] 0.4 mg SL ASDIRECTED 07/18/19 [History]


QUEtiapine [SEROquel] 0.5 tab PO BEDTIME 07/18/19 [History]











Past Medical History


HEENT History: Reports: Glaucoma, Hard of Hearing, Impaired Vision, Sinusitis, 

Other (See Below)


Other HEENT History: Dyphasia, dizziness


Cardiovascular History: Reports: CAD, Heart Failure, High Cholesterol, 

Hypertension


Respiratory History: Reports: COPD, Other (See Below)


Other Respiratory History: Lung nodule.


Gastrointestinal History: Reports: Chronic Constipation, GERD


Genitourinary History: Reports: Acute Renal Failure, Chronic Renal Insuffiency, 

Other (See Below)


Other Genitourinary History: Chronic kidney disease, abdominal wall hernia


OB/GYN History: Reports: Other (See Below)


Other OB/BYN History: Ovarian Cyst


Musculoskeletal History: Reports: Back Pain, Chronic, Other (See Below)


Other Musculoskeletal History: History of vertebral fracture, abdominal wall 

hernia


Neurological History: Reports: Neuropathy, Diabetic, Other (See Below)


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


Psychiatric History: Reports: Addiction, Anxiety, Dementia, Depression, Other (

See Below)


Other Psychiatric History: Somnolence.


Endocrine/Metabolic History: Reports: Diabetes, Type II, IDDM, Obesity/BMI 30+, 

Other (See Below)


Other Endocrine/Metabolic History: Pancreatitis. Goiter


Hematologic History: Reports: Anemia, Iron Deficiency, Other (See Below)


Other Hematologic History: Hyperkalemia, Hyponatremia, iron deficiency, lactic 

acidemia


Immunologic History: Reports: None





- Infectious Disease History


Infectious Disease History: Reports: Chicken Pox, Measles, Mumps





- Past Surgical History


HEENT Surgical History: Reports: Adenoidectomy, Cataract Surgery, Tonsillectomy


Other HEENT Surgeries/Procedures: left ear surgery


Cardiovascular Surgical History: Reports: None


GI Surgical History: Reports: Cholecystectomy, Colonoscopy


Female  Surgical History: Reports: Tubal Ligation





Social & Family History





- Family History


Family Medical History: Unobtainable


HEENT: Reports: Cataract, Glaucoma, Hearing Impairment, Impaired Vision, Otitis 

Media, Sinusitis


Cardiac: Reports: Pacemaker, Prior Cardiac Arrest


Other Cardiac Family History: pt states "my brothers had open heart surgery, 

and my mother had a pacemaker"


Respiratory: Reports: COPD, Sleep Apnea


GI: Reports: Chronic Constipation, GERD, Irritable Bowel Syndrome


: Reports: Diabetic Nephropathy


Other OBGYN Family History: cyst on R ovary


Musculoskeletal: Reports: None


Neurological: Reports: Dementia, Migraines, Neuropathy, Diabetic


Psychiatric: Reports: Anxiety, Autism, Depression


Endocrine/Metabolic: Reports: Diabetes, type II, Vitamin D Deficiency


Hematologic: Reports: Anemia


Oncologic: Reports: None





- Caffeine Use


Caffeine Use: Reports: Coffee


Caffeine Use Comment: Coffee every day- one pot of coffee. Pop occasionally.





H&P Review of Systems





- Review of Systems:


Review Of Systems: See Below


General: Reports: Weakness


HEENT: Reports: No Symptoms


Pulmonary: Reports: No Symptoms


Cardiovascular: Reports: No Symptoms


Gastrointestinal: Reports: No Symptoms


Genitourinary: Reports: No Symptoms


Musculoskeletal: Reports: No Symptoms


Skin: Reports: Dryness


Psychiatric: Reports: No Symptoms


Neurological: Reports: No Symptoms


Hematologic/Lymphatic: Reports: No Symptoms


Immunologic: Reports: No Symptoms





Exam





- Exam


Exam: See Below





- Exam


Quality Assessment: No: Supplemental Oxygen


General: Alert, Oriented, Cooperative


Neck: Supple


Lungs: Clear to Auscultation, Normal Respiratory Effort


Cardiovascular: Regular Rate, Regular Rhythm


GI/Abdominal Exam: Normal Bowel Sounds, Soft.  No: Distended, Guarding, Rigid, 

Mass


 (Female) Exam: Deferred


Rectal (Female) Exam: Deferred


Back Exam: No: CVA Tenderness (L), CVA Tenderness (R)


Extremities: No Pedal Edema


Peripheral Pulses: 1+: Radial (R), 2+: Femoral (L)


Skin: Dry, Other (dry cracked skin bottom of both feet)


Neurological: Cranial Nerves Intact, Reflexes Equal Bilateral


Neuro Extensive - Mental Status: Alert, Oriented x3, Normal Mood/Affect, Normal 

Cognition


Neuro Extensive - Motor, Sensory, Reflexes: CN II-XII Intact, Normal Gait, 

Normal Reflexes


Psychiatric: Alert, Normal Affect, Normal Mood


Problem List Initiated/Reviewed/Updated: Yes


Orders Last 24hrs: 


 Active Orders 24 hr











 Category Date Time Status


 


 Patient Status [ADT] Routine ADT  07/20/19 12:25 Active


 


 Blood Glucose Check, Bedside [RC] TIDAC Care  07/20/19 12:23 Active


 


 Peripheral IV Care [RC] .AS DIRECTED Care  07/20/19 12:23 Active


 


 Peripheral IV Care [RC] .AS DIRECTED Care  07/20/19 12:23 Active


 


 Ready for Discharge [RC] PER UNIT ROUTINE Care  07/20/19 12:23 Active


 


 Up to Chair [RC] ASDIRECTED Care  07/20/19 12:23 Active


 


 Vital Signs [RC] Q4H Care  07/20/19 12:23 Active


 


 Potassium [DIET] Diet  07/20/19 Dinner Active


 


 Sodium Restricted Diet [DIET] Diet  07/20/19 Dinner Active


 


 Alum Hydrox/Mag Hydrox/Simeth [Mag-Al Plus] Med  07/20/19 12:23 Ordered





 30 ml PO QID PRN   


 


 Aspirin Med  07/21/19 09:00 Ordered





 81 mg PO DAILY   


 


 Carvedilol [Coreg] Med  07/20/19 17:30 Ordered





 25 mg PO BIDAC   


 


 Lake/Min Oil/Flower/Wool Alcoh [Eucerin Creme] Med  07/20/19 12:23 Ordered





 0 gm TOP TID PRN   


 


 Citalopram [Celexa] Med  07/21/19 09:00 Ordered





 20 mg PO DAILY   


 


 Docusate Sodium/Sennosides [Senna Plus] Med  07/20/19 21:00 Ordered





 4 tab PO BID   


 


 Ezetimibe [Zetia] Med  07/21/19 09:00 Ordered





 10 mg PO DAILY   


 


 Latanoprost [Xalatan 0.005% Ophth Soln] Med  07/20/19 21:00 Ordered





 0 ml EYEBOTH BEDTIME   


 


 Ondansetron [Zofran] Med  07/20/19 12:23 Ordered





 4 mg IV Q4H PRN   


 


 QUEtiapine [SEROquel] Med  07/20/19 21:00 Ordered





 12.5 mg PO BEDTIME   


 


 Sodium Chloride 0.9% [Saline Flush] Med  07/20/19 12:23 Ordered





 10 ml FLUSH Q8HR PRN   


 


 Sodium Chloride 0.9% [Saline Flush] Med  07/20/19 12:23 Ordered





 10 ml FLUSH Q8HR PRN   


 


 Peripheral IV Insertion Adult [OM.PC] Routine Oth  07/20/19 12:23 Ordered


 


 Resuscitation Status Routine Resus Stat  07/20/19 12:30 Ordered








 Medication Orders





Al Hydroxide/Mg Hydroxide (Mag-Al Plus)  30 ml PO QID PRN


   PRN Reason: heartburn/upset stomach


Aspirin (Aspirin)  81 mg PO DAILY MATTHIAS


Carvedilol (Coreg)  25 mg PO BIDAC MATTHIAS


Citalopram Hydrobromide (Celexa)  20 mg PO DAILY MATTHIAS


Ezetimibe (Zetia)  10 mg PO DAILY MATTHIAS


Latanoprost (Xalatan 0.005% Ophth Soln)  0 ml EYEBOTH BEDTIME MATTHIAS


Multi-Ingredient Ointment (Eucerin Creme)  0 gm TOP TID PRN


   PRN Reason: cracked heels


Ondansetron HCl (Zofran)  4 mg IV Q4H PRN


   PRN Reason: Nausea/Vomiting


Quetiapine Fumarate (Seroquel)  12.5 mg PO BEDTIME MATTHIAS


Senna/Docusate Sodium (Senna Plus)  4 tab PO BID MATTHIAS


Sodium Chloride (Saline Flush)  10 ml FLUSH Q8HR PRN


   PRN Reason: keep vein open


Sodium Chloride (Saline Flush)  10 ml FLUSH Q8HR PRN


   PRN Reason: keep vein open








Assessment/Plan Comment:: 


History of present illness


Debra is a 74-year-old female who was admitted from outpatient clinic yesterday 

by myself Connor Cuellar nurse practitioner due to low blood pressure along with 

dizziness.  Patient was sent from [facility] from [place] swing 

bed on July 6 due to dizziness, uncontrolled hypertension, uncontrolled blood 

sugar levels along with hyperkalemia.  Patient has a history of CKD, 

hyperlipidemia, COPD, obesity, HENRY, coronary artery disease with congestive 

heart failure and was discharged from [facility] on July 8th and was to 

follow-up with home health.  She was seen by Angel LAUGHLIN had labs drawn 

and was to follow-up today.  Patient is hard of hearing.  She is having a 

difficult time managing her medication thereby endocrinology simplified her 

treatment regimen by placing her on Novolin 70/30 15 units BID 30 minutes 

before breakfast and supper. 





Due to her uncontrolled hypertension she was started on  amlodipine 10 mg daily

, carvedilol 25 mg BID, clonidine 0.1 mg BID and metolazone 2.5 mg Monday and 

Thursday.Metolazone was then replaced with lasix 40 mg BID.Pressures remained 

elevatedthroughout the stay and it was recommended that she be managed as 

outpatient for workup of secondary hypertension.  





Patient did have hyperkalemia during swing bed at [place] Aldactone and Vasotec 

were stopped and potassium normalized over the course of the stay. Potassium was

5.1at discharge and Vasotec was restarted.Patient was advised to follow a 

low sodium, low potassium diet.


________________________________


  





Formerly [facility] course at [facility]


Patient's short hospital course prior to placing her in swing bed went well. 

She was given a laxative early on admission and she had multiple stools with 

much less abdominal distention, abdominal films did demonstrate significant 

gastric and small bowel distention however no complicating factors such as 

fever or ischemic bowel was suggested since her clinical condition improved CT 

was likely not necessary especially in light of recent contrast media renal 

injury.   IV fluids were given and her BUN and creatinine trended down nicely 

necessitating stopping IV fluids upon transferring into SNF.  Her blood 

pressure improved, her diabetes insulin blood sugars improved although still 

requiring significant adjustments and more stabilization as needed.  Physical 

therapy by way the patient while acute care and felt that the patient could 

benefit from SNF here at [facility] for medication management, ambulation, 

rebuilding her strength and monitoring her blood sugars and adjusting 

medications.  Although PT note reflects up to 2 weeks likely patient would need 

approximately 1 week as she appears to be resting quite rapidly exceeding her 

baseline ADL capabilities 


___________________________________________________________





Update on rounds, patient is feeling better, multiple stools throughout the 

night, renal function improving with gentle bolus and IV fluids, improved 

appetite, no more vomiting.  





Primary SNF problems


--Acute kidney injury, resolving


--Hypotension


--Dehydration


--T2 DM, uncontrolled


--Constipation, with gastric distention and small bowel distention








Chronic problems


--HFpEF EF 65%


--CKD III, acute injury on 


--CAD


--Hyperlipidemia


--COPD


--Obesity


--HENRY


--Pick's disease


--Depression and anxiety


--Glaucoma


--Iron deficiency anemia











Disposition/overall plan


--Admit to SNF here at [facility] for med mgt, ambulation, rebuilding her strength 

and monitoring her blood sugars. Although PT note reflects up to 2 weeks likely 

patient would need approximately 1 week as she appears to be progressing 

rapidly towards exceeding her baseline ADL capabilities 


--Saline lock


--Diabetic regimen NovoLog 70/30 15u BID

## 2019-07-20 NOTE — PCM.DCSUM1
**Discharge Summary





- Hospital Course


Diagnosis: Stroke: No





- Discharge Data


Discharge Date: 07/20/19


Discharge Disposition: DC/Tfer W/I Hosp To Swing [facility]


Condition: Good





- Discharge Plan


Home Medications: 


 Home Meds





Acetaminophen with Codeine [Acetaminophen-Cod #3] 1 - 2 tab PO Q6H PRN 06/14/19 

[History]


Carvedilol 25 mg PO BIDAC 06/14/19 [History]


Citalopram [Citalopram HBr] 20 mg PO DAILY 06/14/19 [History]


Enalapril [Vasotec] 20 mg PO BID 06/14/19 [History]


Lancets 1 each MC TID 06/14/19 [History]


Omega-3 Fatty Acids/DHA/EPA [Ovega-3 Softgel] 1,000 mg PO BID 06/14/19 [History]


Sennosides/Docusate Sodium [Senna Plus Tablet] 4 tab PO BID 06/14/19 [History]


amLODIPine [Norvasc] 5 mg PO DAILY 06/14/19 [History]


cloNIDine HCl [Clonidine HCl ER] 0.1 mg PO BID 06/14/19 [History]


Aspirin 81 mg PO DAILY 07/18/19 [History]


Calcium Carbonate/Vitamin D3 [Calcium 600 + Vit D 400 Tablet] 1 each PO 

BIDMEALS 07/18/19 [History]


Ezetimibe 1 tab PO DAILY 07/18/19 [History]


Furosemide 40 mg PO DAILY 07/18/19 [History]


Insulin Degludec [Tresiba] 30 units SUBCUT DAILY 07/18/19 [History]


Iron Polysaccharide Complex [Poly-Iron] 1 cap PO ACBREAKFAST 07/18/19 [History]


Latanoprost 1 drop OP BEDTIME 07/18/19 [History]


Niacinamide [Niacin] 500 mg PO TID 07/18/19 [History]


Nitroglycerin [Nitrostat] 0.4 mg SL ASDIRECTED 07/18/19 [History]


QUEtiapine [SEROquel] 0.5 tab PO BEDTIME 07/18/19 [History]











- Discharge Summary/Plan Comment


DC Time >30 min.: Yes


Discharge Summary/Plan Comment: 


Final diagnosis


Acute kidney injury, improving


Hypotension, resolved


Dehydration, resolved


T2 DM, uncontrolled, improving


Constipation, with gastric distention and small bowel distention, improving








History summary


Debra is a 74-year-old female who was admitted from outpatient clinic yesterday 

by myself Connor Cuellar nurse practitioner due to low blood pressure along with 

dizziness.  Patient was sent from [facility] from [place] swing 

bed on July 6 due to dizziness, uncontrolled hypertension, uncontrolled blood 

sugar levels along with hyperkalemia.  Patient has a history of CKD, 

hyperlipidemia, COPD, obesity, HENRY, coronary artery disease with congestive 

heart failure and was discharged from [facility] on July 8th and was to 

follow-up with home health.  She was seen by Angel LAUGHLIN had labs drawn 

and was to follow-up today.  Patient is hard of hearing.  She is having a 

difficult time managing her medication thereby endocrinology simplified her 

treatment regimen by placing her on Novolin 70/30 15 units BID 30 minutes 

before breakfast and supper. 


Due to her uncontrolled hypertension she was started on  amlodipine 10 mg daily

, carvedilol 25 mg BID, clonidine 0.1 mg BID and metolazone 2.5 mg Monday and 

Thursday.Metolazone was then replaced with lasix 40 mg BID.Pressures remained 

elevatedthroughout the stay and it was recommended that she be managed as 

outpatient for workup of secondary hypertension.  Patient did have 

hyperkalemia during swing bed at [place] Aldactone and Vasotec were stopped and 

potassium normalized over the course of the stay. Potassium was5.1at 

discharge and Vasotec was restarted.Patient was advised to follow a low sodium

, low potassium diet. When I seen her in [place] clinic he was complaining of 

abdominal distention, constipation, nausea and vomiting, dizziness and her 

blood pressure is low.  He had no bowel movement in the previous 3-4 days.  





Hospital course


Patient's short hospital course prior to placing her in swing bed went well. 

She was given a laxative early on admission and she had multiple stools with 

much less abdominal distention, abdominal films did demonstrate significant 

gastric and small bowel distention however no complicating factors such as 

fever or ischemic bowel was suggested since her clinical condition improved CT 

was likely not necessary.--Especially in light of recent contrast media injury 

develop into VARSHA.  IV fluids were given and her BUN and creatinine trended 

down.  Her blood pressure improved, her diabetes insulin blood sugars improved.

  Physical therapy felt patient could benefit from SNF here at [facility] 

for medication management, ambulation, rebuilding her strength and monitoring 

her blood sugars and adjusting medications





Disposition


Physical therapy was consulted and patient could benefit from SNF here at [facility] for medication management, ambulation, rebuilding her strength and 

monitoring her blood sugars and adjusting medications





- General Info


Date of Service: 07/20/19


Functional Status: Reports: Pain Controlled, Tolerating Diet, Ambulating, 

Urinating.  Denies: New Symptoms





- Review of Systems


General: Reports: Weakness


HEENT: Reports: No Symptoms


Pulmonary: Reports: No Symptoms


Cardiovascular: Reports: No Symptoms


Gastrointestinal: Reports: No Symptoms


Genitourinary: Reports: No Symptoms


Musculoskeletal: Reports: No Symptoms


Skin: Reports: Dryness


Neurological: Reports: Weakness, Gait Disturbance.  Denies: Confusion


Psychiatric: Reports: No Symptoms





- Patient Data


Vitals - Most Recent: 


 Last Vital Signs











Temp  98.3 F   07/20/19 06:27


 


Pulse  57 L  07/20/19 07:53


 


Resp  18   07/20/19 06:27


 


BP  172/78 H  07/20/19 07:53


 


Pulse Ox  93 L  07/20/19 06:27











Weight - Most Recent: 195 lb 4.8 oz


I&O - Last 24 hours: 


 Intake & Output











 07/19/19 07/20/19 07/20/19





 22:59 06:59 14:59


 


Intake Total 1539 635 


 


Output Total 3100 1425 


 


Balance -1561 -790 











Lab Results - Last 24 hrs: 


 Laboratory Results - last 24 hr











  07/19/19 07/20/19 07/20/19 Range/Units





  17:35 07:15 07:47 


 


Sodium   143   (136-145)  mmol/L


 


Potassium   5.1   (3.3-5.3)  mmol/L


 


Chloride   110   ()  mmol/L


 


Carbon Dioxide   25.5   (21.0-32.0)  mmol/L


 


Anion Gap   12.6   (5-15)  mmol/L


 


BUN   50 H D   (6-25)  mg/dL


 


Creatinine   1.13   (0.51-1.17)  mg/dL


 


Est Cr Clr Drug Dosing   35.58   mL/min


 


Estimated GFR (MDRD)   47   mL/min


 


Glucose   164 H  (75 - 99) mg/dL


 


POC Glucose  191 H   182 H  ()  mg/dl


 


Calcium   8.3 L   (8.7-10.3)  mg/dL











Med Orders - Current: 


 Current Medications





Al Hydroxide/Mg Hydroxide (Mag-Al Plus)  30 ml PO QID PRN


   PRN Reason: heartburn/upset stomach


   Last Admin: 07/19/19 20:18 Dose:  30 ml


Aspirin (Aspirin)  81 mg PO DAILY [facility]


   Last Admin: 07/20/19 08:40 Dose:  81 mg


Carvedilol (Coreg)  25 mg PO BIDAC [facility]


   Last Admin: 07/20/19 07:53 Dose:  Not Given


Citalopram Hydrobromide (Celexa)  20 mg PO DAILY [facility]


   Last Admin: 07/20/19 08:40 Dose:  20 mg


Ezetimibe (Zetia)  10 mg PO DAILY [facility]


   Last Admin: 07/20/19 08:40 Dose:  10 mg


Sodium Chloride (Normal Saline)  1,000 mls @ 70 mls/hr IV ASDIRECTED [facility]


   Last Admin: 07/19/19 19:40 Dose:  70 mls/hr


Insulin Human Regular (Novolin R)  15 unit SUBCUT BIDMEALS [facility]; Protocol


   Last Admin: 07/20/19 08:23 Dose:  Not Given


Latanoprost (Xalatan 0.005% Ophth Soln)  0 ml EYEBOTH BEDTIME [facility]


   Last Admin: 07/19/19 20:21 Dose:  1 drop


Multi-Ingredient Ointment (Eucerin Creme)  0 gm TOP TID PRN


   PRN Reason: cracked heels


   Last Admin: 07/19/19 13:07 Dose:  1 applic


Ondansetron HCl (Zofran)  4 mg IV Q4H PRN


   PRN Reason: Nausea/Vomiting


   Last Admin: 07/18/19 15:45 Dose:  4 mg


Quetiapine Fumarate (Seroquel)  12.5 mg PO BEDTIME [facility]


   Last Admin: 07/19/19 20:19 Dose:  12.5 mg


Senna/Docusate Sodium (Senna Plus)  4 tab PO BID [facility]


   Last Admin: 07/20/19 08:39 Dose:  3 tab


Sodium Chloride (Saline Flush)  10 ml FLUSH Q8HR PRN


   PRN Reason: keep vein open


   Last Admin: 07/18/19 15:30 Dose:  10 ml





Discontinued Medications





Insulin Glargine (Lantus Solostar)  40 units SUBCUT DAILY [facility]


Insulin Glargine (Lantus Solostar)  30 units SUBCUT DAILY [facility]


   Last Admin: 07/19/19 09:59 Dose:  30 units


Non-Formulary Medication (Insulin Degludec [Tresiba])  30 units SUBCUT DAILY [facility]


   Last Admin: 07/19/19 12:29 Dose:  Not Given


Non-Formulary Medication (Lancets [Lancets])  1 each MC TID MATTHIAS











- Exam


Quality Assessment: Denies: Supplemental Oxygen


General: Reports: Alert, Oriented


Neck: Reports: Supple


Lungs: Reports: Clear to Auscultation, Normal Respiratory Effort


Cardiovascular: Reports: Regular Rate, Regular Rhythm


GI/Abdominal Exam: Normal Bowel Sounds, Soft.  No: Distended, Guarding, Rigid, 

Mass


 (Female) Exam: Deferred


Back Exam: Denies: CVA Tenderness (L), CVA Tenderness (R)


Extremities: No Pedal Edema


Skin: Reports: Dry


Neurological: Reports: No New Focal Deficit


Psy/Mental Status: Reports: Alert.  Denies: Anxious

## 2019-07-21 LAB
ANION GAP SERPL CALC-SCNC: 11.7 MMOL/L (ref 5–15)
CHLORIDE SERPL-SCNC: 110 MMOL/L (ref 98–115)
SODIUM SERPL-SCNC: 142 MMOL/L (ref 136–145)

## 2019-07-21 RX ADMIN — INSULIN ASPART SCH: 100 INJECTION, SUSPENSION SUBCUTANEOUS at 17:59

## 2019-07-21 RX ADMIN — ALUMINUM HYDROXIDE, MAGNESIUM HYDROXIDE, AND SIMETHICONE PRN ML: 200; 200; 20 SUSPENSION ORAL at 20:59

## 2019-07-21 RX ADMIN — ACETAMINOPHEN SCH MG: 650 TABLET, FILM COATED, EXTENDED RELEASE ORAL at 17:51

## 2019-07-21 RX ADMIN — ACETAMINOPHEN SCH MG: 650 TABLET, FILM COATED, EXTENDED RELEASE ORAL at 12:10

## 2019-07-21 RX ADMIN — INSULIN ASPART SCH UNITS: 100 INJECTION, SUSPENSION SUBCUTANEOUS at 08:19

## 2019-07-22 RX ADMIN — ACETAMINOPHEN SCH MG: 650 TABLET, FILM COATED, EXTENDED RELEASE ORAL at 14:08

## 2019-07-22 RX ADMIN — ACETAMINOPHEN SCH: 650 TABLET, FILM COATED, EXTENDED RELEASE ORAL at 05:04

## 2019-07-22 RX ADMIN — INSULIN ASPART SCH UNITS: 100 INJECTION, SUSPENSION SUBCUTANEOUS at 08:05

## 2019-07-22 RX ADMIN — ACETAMINOPHEN SCH MG: 650 TABLET, FILM COATED, EXTENDED RELEASE ORAL at 06:02

## 2019-07-22 RX ADMIN — INSULIN ASPART SCH UNITS: 100 INJECTION, SUSPENSION SUBCUTANEOUS at 18:08

## 2019-07-22 RX ADMIN — ACETAMINOPHEN SCH MG: 650 TABLET, FILM COATED, EXTENDED RELEASE ORAL at 21:39

## 2019-07-23 RX ADMIN — ACETAMINOPHEN SCH MG: 650 TABLET, FILM COATED, EXTENDED RELEASE ORAL at 06:26

## 2019-07-23 RX ADMIN — INSULIN ASPART SCH UNIT: 100 INJECTION, SUSPENSION SUBCUTANEOUS at 18:15

## 2019-07-23 RX ADMIN — Medication PRN APPLIC: at 21:14

## 2019-07-23 RX ADMIN — ACETAMINOPHEN SCH MG: 650 TABLET, FILM COATED, EXTENDED RELEASE ORAL at 21:12

## 2019-07-23 RX ADMIN — INSULIN ASPART SCH UNITS: 100 INJECTION, SUSPENSION SUBCUTANEOUS at 08:09

## 2019-07-23 RX ADMIN — ACETAMINOPHEN SCH MG: 650 TABLET, FILM COATED, EXTENDED RELEASE ORAL at 14:45

## 2019-07-23 NOTE — PCM.SN
- Free Text/Narrative


Note: 


reviewed blood glucose levels, have improved however not ideal and still 

slightly elevated, increase NovoLog 70/30 from 15 to 17 Units BID

## 2019-07-24 LAB
ANION GAP SERPL CALC-SCNC: 14.6 MMOL/L (ref 5–15)
CHLORIDE SERPL-SCNC: 107 MMOL/L (ref 98–115)
SODIUM SERPL-SCNC: 138 MMOL/L (ref 136–145)

## 2019-07-24 RX ADMIN — INSULIN ASPART SCH UNIT: 100 INJECTION, SUSPENSION SUBCUTANEOUS at 07:58

## 2019-07-24 RX ADMIN — ACETAMINOPHEN SCH MG: 650 TABLET, FILM COATED, EXTENDED RELEASE ORAL at 06:31

## 2019-07-24 RX ADMIN — ACETAMINOPHEN SCH MG: 650 TABLET, FILM COATED, EXTENDED RELEASE ORAL at 13:39

## 2019-07-24 RX ADMIN — INSULIN ASPART SCH UNIT: 100 INJECTION, SUSPENSION SUBCUTANEOUS at 18:03

## 2019-07-24 RX ADMIN — ACETAMINOPHEN SCH MG: 650 TABLET, FILM COATED, EXTENDED RELEASE ORAL at 21:09

## 2019-07-25 RX ADMIN — INSULIN ASPART SCH UNIT: 100 INJECTION, SUSPENSION SUBCUTANEOUS at 08:18

## 2019-07-25 RX ADMIN — INSULIN ASPART SCH UNIT: 100 INJECTION, SUSPENSION SUBCUTANEOUS at 18:11

## 2019-07-25 RX ADMIN — ACETAMINOPHEN SCH MG: 650 TABLET, FILM COATED, EXTENDED RELEASE ORAL at 21:26

## 2019-07-25 RX ADMIN — ACETAMINOPHEN SCH MG: 650 TABLET, FILM COATED, EXTENDED RELEASE ORAL at 13:41

## 2019-07-25 RX ADMIN — ACETAMINOPHEN SCH MG: 650 TABLET, FILM COATED, EXTENDED RELEASE ORAL at 06:17

## 2019-07-26 RX ADMIN — INSULIN ASPART SCH: 100 INJECTION, SUSPENSION SUBCUTANEOUS at 18:33

## 2019-07-26 RX ADMIN — Medication PRN APPLIC: at 08:02

## 2019-07-26 RX ADMIN — ACETAMINOPHEN SCH: 650 TABLET, FILM COATED, EXTENDED RELEASE ORAL at 16:16

## 2019-07-26 RX ADMIN — ACETAMINOPHEN SCH MG: 650 TABLET, FILM COATED, EXTENDED RELEASE ORAL at 21:15

## 2019-07-26 RX ADMIN — ACETAMINOPHEN SCH MG: 650 TABLET, FILM COATED, EXTENDED RELEASE ORAL at 06:24

## 2019-07-26 RX ADMIN — INSULIN ASPART SCH UNIT: 100 INJECTION, SUSPENSION SUBCUTANEOUS at 08:02

## 2019-07-26 RX ADMIN — Medication PRN APPLIC: at 18:36

## 2019-07-27 RX ADMIN — ACETAMINOPHEN SCH MG: 650 TABLET, FILM COATED, EXTENDED RELEASE ORAL at 06:26

## 2019-07-27 RX ADMIN — INSULIN ASPART SCH UNIT: 100 INJECTION, SUSPENSION SUBCUTANEOUS at 18:15

## 2019-07-27 RX ADMIN — INSULIN ASPART SCH UNIT: 100 INJECTION, SUSPENSION SUBCUTANEOUS at 08:26

## 2019-07-27 RX ADMIN — ACETAMINOPHEN SCH MG: 650 TABLET, FILM COATED, EXTENDED RELEASE ORAL at 14:43

## 2019-07-27 RX ADMIN — ACETAMINOPHEN SCH MG: 650 TABLET, FILM COATED, EXTENDED RELEASE ORAL at 22:15

## 2019-07-28 ENCOUNTER — HOSPITAL ENCOUNTER (INPATIENT)
Dept: HOSPITAL 77 - KA.MS | Age: 74
LOS: 2 days | Discharge: HOME | DRG: 682 | End: 2019-07-30
Attending: NURSE PRACTITIONER | Admitting: NURSE PRACTITIONER
Payer: MEDICARE

## 2019-07-28 DIAGNOSIS — K21.9: ICD-10-CM

## 2019-07-28 DIAGNOSIS — I50.9: ICD-10-CM

## 2019-07-28 DIAGNOSIS — N18.9: ICD-10-CM

## 2019-07-28 DIAGNOSIS — E87.5: ICD-10-CM

## 2019-07-28 DIAGNOSIS — E11.42: ICD-10-CM

## 2019-07-28 DIAGNOSIS — F41.9: ICD-10-CM

## 2019-07-28 DIAGNOSIS — G89.29: ICD-10-CM

## 2019-07-28 DIAGNOSIS — E66.9: ICD-10-CM

## 2019-07-28 DIAGNOSIS — D50.9: ICD-10-CM

## 2019-07-28 DIAGNOSIS — Z90.49: ICD-10-CM

## 2019-07-28 DIAGNOSIS — I12.9: ICD-10-CM

## 2019-07-28 DIAGNOSIS — E78.5: ICD-10-CM

## 2019-07-28 DIAGNOSIS — Z98.49: ICD-10-CM

## 2019-07-28 DIAGNOSIS — H54.7: ICD-10-CM

## 2019-07-28 DIAGNOSIS — Z79.82: ICD-10-CM

## 2019-07-28 DIAGNOSIS — E78.00: ICD-10-CM

## 2019-07-28 DIAGNOSIS — I13.0: ICD-10-CM

## 2019-07-28 DIAGNOSIS — Z98.51: ICD-10-CM

## 2019-07-28 DIAGNOSIS — E87.1: ICD-10-CM

## 2019-07-28 DIAGNOSIS — K59.09: ICD-10-CM

## 2019-07-28 DIAGNOSIS — J44.9: ICD-10-CM

## 2019-07-28 DIAGNOSIS — I25.10: ICD-10-CM

## 2019-07-28 DIAGNOSIS — Z79.4: ICD-10-CM

## 2019-07-28 DIAGNOSIS — Z91.041: ICD-10-CM

## 2019-07-28 DIAGNOSIS — Z88.8: ICD-10-CM

## 2019-07-28 DIAGNOSIS — N17.9: Primary | ICD-10-CM

## 2019-07-28 DIAGNOSIS — F03.90: ICD-10-CM

## 2019-07-28 DIAGNOSIS — E11.22: ICD-10-CM

## 2019-07-28 DIAGNOSIS — K52.9: ICD-10-CM

## 2019-07-28 DIAGNOSIS — F32.9: ICD-10-CM

## 2019-07-28 DIAGNOSIS — K85.90: ICD-10-CM

## 2019-07-28 DIAGNOSIS — H91.90: ICD-10-CM

## 2019-07-28 LAB
ANION GAP SERPL CALC-SCNC: 11.4 MMOL/L (ref 5–15)
ANION GAP SERPL CALC-SCNC: 14.9 MMOL/L (ref 5–15)
CHLORIDE SERPL-SCNC: 102 MMOL/L (ref 98–115)
CHLORIDE SERPL-SCNC: 107 MMOL/L (ref 98–115)
SODIUM SERPL-SCNC: 134 MMOL/L (ref 136–145)
SODIUM SERPL-SCNC: 134 MMOL/L (ref 136–145)

## 2019-07-28 RX ADMIN — SODIUM POLYSTYRENE SULFONATE SCH GM: 15 SUSPENSION ORAL; RECTAL at 16:16

## 2019-07-28 RX ADMIN — ALUMINUM HYDROXIDE, MAGNESIUM HYDROXIDE, AND SIMETHICONE PRN ML: 200; 200; 20 SUSPENSION ORAL at 00:20

## 2019-07-28 RX ADMIN — INSULIN ASPART SCH UNIT: 100 INJECTION, SUSPENSION SUBCUTANEOUS at 18:19

## 2019-07-28 RX ADMIN — ONDANSETRON PRN MG: 2 INJECTION, SOLUTION INTRAMUSCULAR; INTRAVENOUS at 00:45

## 2019-07-28 RX ADMIN — ACETAMINOPHEN SCH MG: 650 TABLET, FILM COATED, EXTENDED RELEASE ORAL at 21:44

## 2019-07-28 RX ADMIN — ACETAMINOPHEN SCH MG: 650 TABLET, FILM COATED, EXTENDED RELEASE ORAL at 06:39

## 2019-07-28 RX ADMIN — INSULIN ASPART SCH UNIT: 100 INJECTION, SUSPENSION SUBCUTANEOUS at 08:50

## 2019-07-28 RX ADMIN — SODIUM POLYSTYRENE SULFONATE SCH GM: 15 SUSPENSION ORAL; RECTAL at 21:45

## 2019-07-28 NOTE — CT
______________________________________________________________________________   

  

[id number] CT/CT Abdomen W IV  

EXAM: CT Abdomen W IV  

   

 CLINICAL DATA: ABDOMINAL PAIN  

   

 COMPARISON STUDY: None.  

   

 FINDINGS:  

   

 Dependent atelectasis at the lung bases. The heart is enlarged. Coronary artery  

 disease. Aortic valvular and mitral annular calcifications. Atherosclerotic  

 calcifications of the aorta and its branches.   

   

 The liver, spleen, and pancreas are unremarkable. The gallbladder surgically  

 absent. There is a 2.4 cm fat-containing left adrenal nodule consistent with a  

 myolipoma. The right adrenal gland is unremarkable. The kidneys are  

 unremarkable. No hydronephrosis or hydroureter. No obstructing renal calculi are  

 identified.  

   

 No bowel obstruction or inflammation. There is significant redundant sigmoid  

 colon. Fluid-filled small and large bowel.   

   

 No lymphadenopathy, free fluid, or pneumoperitoneum.   

   

 Scattered changes of spondylosis the spine. No fracture or osseous lesion.  

   

 IMPRESSION:  

   

   

 1. Fluid-filled small and large bowel. There is no evidence of obstruction but  

 this could suggest enterocolitis.  

 2. Left adrenal myolipoma measuring up to 2.4 cm.  

   

 Electronically signed by Atul Orourke MD on 7/28/2019 9:50 AM  

   

  

Atul Orourke DO                 

 07/28/19 0952    

  

Thank you for allowing us to participate in the care of your patient.

## 2019-07-28 NOTE — PCM.HP
H&P History of Present Illness





- General


Date of Service: 07/28/19


Admit Problem/Dx: 


 Admission Diagnosis/Problem





Admission Diagnosis/Problem      Hyperkalemia, colitis, mild pancreatitis








Source of Information: Patient, Old Records





- History of Present Illness


Initial Comments - Free Text/Narative: 


 History Summary:


Debra is a 74 year old female who was initially admitted to the hospital from an 

outpatient clinic on 7/17/19 due to hypotension, dizziness, and abdominal pain. 

Patient was previously in [facility] on July 6th and was transferred to 

[place] for dizziness, uncontrolled HTN, uncontrolled DM, and hyperkalemia. 

Patient has a history of CKD, hyperlipidemia, COPD, obesity, HENRY, CAD with CHF 

and was discharged from [facility] on July 8th. At that time patient was 

started on amlodipine 10mg, carvedilol 25mg BID, Clonidine 0.1mg BID, and 

metolazone 2.5mg Monday and Thursday. Metolazone was replaced with Lasix BID. 

Patient did have hyperkalemia during hospitalization which normalized with 

discontinuation of Aldactone and Vasotec. 





Hospital course:


Patient's initial course prior to placing in [facility] bed was going well. She was 

given laxative early on in admission and she had multiple stools with 

improvement in abdominal distention. She had x-ray imaging of the abdomen done 

which demonstrated significant gastric and small bowel distention. Patient did 

improve clinically in this regard. She did previously have an VARSHA likely from 

recent contrast media injury. IV fluids were given on admit and her BUN and 

creatinine trended down nicely. BP has improved overall and patient had been 

taking Amlodipine 5mg daily and enalapril 10mg BID. Blood sugar control has 

improved. Patient was transferred to SNF here at [facility] for medication 

management, ambulation, rebuilding her strength, monitoring her blood sugars 

and adjusting medications. 





During the early morning hours of 7/28/19 the patient began to complain of 

sudden onset of severe abdominal pain, with nausea, vomiting, and frequent 

diarrhea. CBC done at that time with increased WBC at 17.75 however normal 

neutrophil count. Lipase mildly increased at 432. Na-134,  K+4.8, BUN-43, 

Creatinine 1.28. GFR 41. Abdominal assessment with a significant acutely tender 

abdomen primarily to the LLQ and LUQ but generalized tenderness noted, with 

patient guarding of abdomen. Patient afebrile. She had been having normal, 

regular BM prior to onset of acute abdominal pain. IV zofran given with limited 

relief initially. Maalox given. Patient was given a 250mL NS bolus x2 and 

started on maintenance fluid. CT of the abdomen pelvis done showing "large 

distal duodenal diverticulum, of uncertain clinical significance, fluid-filled 

but non-obstructed large and small bowel, possible enteritis. Also of note- 

left adrenal lesion with the appearance of a benign myelolipoma, without 

hemorrhage. Small fat containing abdominal wall hernia." 








  ** Back


Pain Score (Numeric/FACES): 2





- Related Data


Allergies/Adverse Reactions: 


 Allergies











Allergy/AdvReac Type Severity Reaction Status Date / Time


 


blue dye Allergy  Other Verified 07/18/19 15:47


 


fenofibrate [From Tricor] Allergy  Other Verified 07/18/19 15:47


 


simvastatin Allergy  Other Verified 07/18/19 15:47


 


Statins-Hmg-Coa Reductase Allergy  Cannot Verified 07/18/19 15:50





Inhibitor   Remember  











Home Medications: 


 Home Meds





Acetaminophen with Codeine [Acetaminophen-Cod #3] 1 - 2 tab PO Q6H PRN 06/14/19 

[History]


Carvedilol 25 mg PO BIDAC 06/14/19 [History]


Citalopram [Citalopram HBr] 20 mg PO DAILY 06/14/19 [History]


Lancets 1 each MC TID 06/14/19 [History]


Omega-3 Fatty Acids/DHA/EPA [Ovega-3 Softgel] 1,000 mg PO BID 06/14/19 [History]


Aspirin 81 mg PO DAILY 07/18/19 [History]


Calcium Carbonate/Vitamin D3 [Calcium 600-Vit D3 400 Tablet] 1 each PO BIDMEALS 

07/18/19 [History]


Ezetimibe 1 tab PO DAILY 07/18/19 [History]


Iron Polysaccharide Complex [Poly-Iron] 1 cap PO ACBREAKFAST 07/18/19 [History]


Latanoprost 1 drop OP BEDTIME 07/18/19 [History]


Niacinamide [Niacin] 500 mg PO TID 07/18/19 [History]


Nitroglycerin [Nitrostat] 0.4 mg SL ASDIRECTED 07/18/19 [History]


QUEtiapine [SEROquel] 0.5 tab PO BEDTIME 07/18/19 [History]


Acetaminophen [Tylenol Arthritis] 650 mg PO Q8HR 07/28/19 [History]


Alum Hydrox/Mag Hydrox/Simeth [Mag-Al Plus] 30 ml PO QID PRN 07/28/19 [History]


[place]/Min Oil/Flower/Wool Alcoh [Eucerin Creme] 1 applic TOP TID PRN 07/28/19 [

History]


Sennosides/Docusate Sodium [Senna-Docusate Sodium Tablet] 4 tab PO BID 07/28/19 

[History]


Insuln Asp Prot/Insulin Aspart [NovoLOG Mix 70-30] 17 unit SUBCUT BIDMEALS #3 

pen 07/30/19 [Rx]


amLODIPine [Norvasc] 10 mg PO DAILY #90 tablet 07/30/19 [Rx]


cloNIDine HCl [Clonidine HCl ER] 0.1 mg PO BID #60 tab.er.12h 07/30/19 [Rx]











Past Medical History


HEENT History: Reports: Glaucoma, Hard of Hearing, Impaired Vision, Sinusitis, 

Other (See Below)


Other HEENT History: Dyphasia, dizziness


Cardiovascular History: Reports: CAD, Heart Failure, High Cholesterol, 

Hypertension


Respiratory History: Reports: COPD, Other (See Below)


Other Respiratory History: Lung nodule.


Gastrointestinal History: Reports: Chronic Constipation, GERD


Genitourinary History: Reports: Acute Renal Failure, Chronic Renal Insuffiency, 

Other (See Below)


Other Genitourinary History: Chronic kidney disease, abdominal wall hernia


OB/GYN History: Reports: Other (See Below)


Other OB/BYN History: Ovarian Cyst


Musculoskeletal History: Reports: Back Pain, Chronic, Other (See Below)


Other Musculoskeletal History: History of vertebral fracture, abdominal wall 

hernia


Neurological History: Reports: Neuropathy, Diabetic, Other (See Below)


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


Psychiatric History: Reports: Addiction, Anxiety, Dementia, Depression, Other (

See Below)


Other Psychiatric History: Somnolence.


Endocrine/Metabolic History: Reports: Diabetes, Type II, IDDM, Obesity/BMI 30+, 

Other (See Below)


Other Endocrine/Metabolic History: Pancreatitis. Goiter


Hematologic History: Reports: Anemia, Iron Deficiency, Other (See Below)


Other Hematologic History: Hyperkalemia, Hyponatremia, iron deficiency, lactic 

acidemia


Immunologic History: Reports: None


Oncologic (Cancer) History: Reports: None


Dermatologic History: Reports: None





- Infectious Disease History


Infectious Disease History: Reports: Chicken Pox, Measles, Mumps





- Past Surgical History


Head Surgeries/Procedures: Reports: None


HEENT Surgical History: Reports: Adenoidectomy, Cataract Surgery, Tonsillectomy


Other HEENT Surgeries/Procedures: left ear surgery


Cardiovascular Surgical History: Reports: None


GI Surgical History: Reports: Cholecystectomy, Colonoscopy


Female  Surgical History: Reports: Tubal Ligation


Oncologic Surgical History: Reports: None


Dermatological Surgical History: Reports: None





Social & Family History





- Family History


Family Medical History: Unobtainable


HEENT: Reports: Cataract, Glaucoma, Hearing Impairment, Impaired Vision, Otitis 

Media, Sinusitis


Cardiac: Reports: Pacemaker, Prior Cardiac Arrest


Other Cardiac Family History: pt states "my brothers had open heart surgery, 

and my mother had a pacemaker"


Respiratory: Reports: COPD, Sleep Apnea


GI: Reports: Chronic Constipation, GERD, Irritable Bowel Syndrome


: Reports: Diabetic Nephropathy


OBGYN: Reports: None


Other OBGYN Family History: cyst on R ovary


Musculoskeletal: Reports: None


Neurological: Reports: Dementia, Migraines, Neuropathy, Diabetic


Psychiatric: Reports: Anxiety, Autism, Depression


Endocrine/Metabolic: Reports: Diabetes, type II, Vitamin D Deficiency


Hematologic: Reports: Anemia


Immunologic: Reports: None


Dermatologic: Reports: None


Oncologic: Reports: None





- Tobacco Use


Smoking Status *Q: Never Smoker





- Caffeine Use


Caffeine Use: Reports: Coffee


Caffeine Use Comment: Coffee every day- one pot of coffee. Pop occasionally.





- Recreational Drug Use


Recreational Drug Use: No





H&P Review of Systems





- Review of Systems:


Review Of Systems: See Below


General: Reports: Weakness, Fatigue.  Denies: Fever, Chills


HEENT: Reports: No Symptoms


Pulmonary: Denies: Shortness of Breath, Wheezing, Cough, Sputum


Cardiovascular: Denies: Chest Pain, Palpitations, Dyspnea on Exertion, Edema


Gastrointestinal: Reports: Abdominal Pain, Diarrhea, Nausea, Vomiting


Genitourinary: Reports: No Symptoms


Musculoskeletal: Reports: No Symptoms


Skin: Reports: No Symptoms


Psychiatric: Reports: Confusion (at times)


Neurological: Reports: No Symptoms





Exam





- Exam


Exam: See Below





- Vital Signs


Vital Signs: 


 Last Vital Signs











Temp  97.8 F   07/28/19 14:10


 


Pulse  62   07/28/19 14:10


 


Resp  16   07/28/19 14:10


 


BP  156/71 H  07/28/19 14:10


 


Pulse Ox  94 L  07/28/19 14:10











Weight: 192 lb 8 oz





- Exam


General: Alert, Oriented


HEENT: Conjunctiva Clear


Neck: Supple.  No: JVD


Lungs: Clear to Auscultation, Normal Respiratory Effort


Cardiovascular: Regular Rate, Regular Rhythm


GI/Abdominal Exam: Normal Bowel Sounds, Distended, Guarding, Tender


 (Female) Exam: Deferred


Rectal (Female) Exam: Deferred


Back Exam: Normal Inspection


Extremities: Normal Inspection, No Pedal Edema


Skin: Warm, Dry, Intact


Neuro Extensive - Mental Status: Alert, Oriented x3


Neuro Extensive - Motor, Sensory, Reflexes: CN II-XII Intact


Psychiatric: Alert





- Patient Data


Result Diagrams: 


 07/29/19 07:30





 07/30/19 08:50


Problem List Initiated/Reviewed/Updated: Yes


Orders Last 24hrs: 


 Active Orders 24 hr











 Category Date Time Status


 


 Patient Status [ADT] Routine ADT  07/28/19 16:04 Active


 


 Oxygen Therapy [RC] PRN Care  07/28/19 16:04 Active


 


 VTE/DVT Education [RC] PER UNIT ROUTINE Care  07/28/19 16:04 Active


 


 Vital Signs [RC] Q4H Care  07/28/19 16:04 Active


 


 ADA Diabetic [American Diabetic Association Diet] [DIET Diet  07/28/19 Dinner 

Active





 ]   


 


 Sodium Chloride 0.9% [Normal Saline] 1,000 ml Med  07/28/19 16:04 Active





 IV ASDIRECTED   


 


 Sodium Chloride 0.9% [Saline Flush] Med  07/28/19 16:04 Active





 10 ml FLUSH Q8HR PRN   


 


 Sodium Polystyrene Sulfonate [Kayexalate] Med  07/28/19 16:00 Active





 15 gm PO BID   


 


 amLODIPine [Norvasc] Med  07/29/19 09:00 Active





 10 mg PO DAILY   


 


 Saline Lock Insert [OM.PC] Routine Oth  07/28/19 16:04 Ordered


 


 Resuscitation Status Routine Resus Stat  07/28/19 15:48 Ordered








 Medication Orders





Amlodipine Besylate (Norvasc)  10 mg PO DAILY MATTHIAS


Sodium Chloride (Normal Saline)  1,000 mls @ 100 mls/hr IV ASDIRECTED MATTHIAS


Sodium Chloride (Saline Flush)  10 ml FLUSH Q8HR PRN


   PRN Reason: keep vein open


Sodium Polystyrene Sulfonate (Kayexalate)  15 gm PO BID MATTHIAS


   Last Admin: 07/28/19 16:16  Dose: 15 gm








Assessment/Plan Comment:: 


History Summary:


Debra is a 74 year old female who was initially admitted to the hospital from an 

outpatient clinic on 7/17/19 due to hypotension, dizziness, and abdominal pain. 

Patient was previously in [facility] on July 6th and was transferred to 

[place] for dizziness, uncontrolled HTN, uncontrolled DM, and hyperkalemia. 

Patient has a history of CKD, hyperlipidemia, COPD, obesity, HENRY, CAD with CHF 

and was discharged from [facility] on July 8th. At that time patient was 

started on amlodipine 10mg, carvedilol 25mg BID, Clonidine 0.1mg BID, and 

metolazone 2.5mg Monday and Thursday. Metolazone was replaced with Lasix BID. 

Patient did have hyperkalemia during hospitalization which normalized with 

discontinuation of Aldactone and Vasotec. 





Hospital course:


Patient's initial course prior to placing in [facility] bed was going well. She was 

given laxative early on in admission and she had multiple stools with 

improvement in abdominal distention. She had x-ray imaging of the abdomen done 

which demonstrated significant gastric and small bowel distention. Patient did 

improve clinically in this regard. She did previously have an VARSHA likely from 

recent contrast media injury. IV fluids were given on admit and her BUN and 

creatinine trended down nicely. BP has improved overall and patient had been 

taking Amlodipine 5mg daily and enalapril 10mg BID. Blood sugar control has 

improved. Patient was transferred to SNF here at [facility] for medication 

management, ambulation, rebuilding her strength, monitoring her blood sugars 

and adjusting medications. 





During the early morning hours of 7/28/19 the patient began to complain of 

sudden onset of severe abdominal pain, with nausea, vomiting, and frequent 

diarrhea. CBC done at that time with increased WBC at 17.75 however normal 

neutrophil count. Lipase mildly increased at 432. Na-134,  K+4.8, BUN-43, 

Creatinine 1.28. GFR 41. Abdominal assessment with a significant acutely tender 

abdomen primarily to the LLQ and LUQ but generalized tenderness noted, with 

patient guarding of abdomen. Patient afebrile. She had been having normal, 

regular BM prior to onset of acute abdominal pain. IV zofran given with limited 

relief initially. Maalox given. Patient was given a 250mL NS bolus x2 and 

started on maintenance fluid. CT of the abdomen pelvis done showing "large 

distal duodenal diverticulum, of uncertain clinical significance, fluid-filled 

but non-obstructed large and small bowel, possible enteritis. Also of note- 

left adrenal lesion with the appearance of a benign myelolipoma, without 

hemorrhage. Small fat containing abdominal wall hernia." 





Repeat labwork done in the AM with mild improvement in renal function with BUN 

40, creatinine 1.19, sodium 124. Potassium noted to be elevated at 6.0. recheck 

potassium done and 6.0. Given hyperkalemia and concerns for acute on chronic 

kidney injury patient switched from SNF to Inpatient status. 





Hospital Problems:


#Colitis, mild pancreatitis- Mild continued nausea. Continue with Zofran PRN. 

Abdominal pain is improved although quite tender to palpation. 


#Hyperkalemia- Will proceed with kaexylate x2 doses with recheck in AM. On 

telemetry. 


#Hypertension- discontinue enalapril given hyperkalemia. Will increased 

amlodipine. Continue on coreg BID. Will start clonidine 0.1mg BID. 


#Acute kidney injury- continue with IV therapy. BNP done today given underlying 

CHF and normal at 17. chest x-ray done with cardiomegaly without acute 

exacerbation. Recheck renal function in the AM





Chronic conditions-


#CAD- On ASA therapy


#CHF- BNP 17


#HLD- intolerant to statins. On ezetimibe


#SUDHEER- On Niferex


#DM- Novolog 70/30 BID


#Constipation- on Senna- will hold given current diarrhea. 


#CKD


#Depression and Anxiety- Celexa


#Hyponatremia- 








FEN: ADA, low potassium, low sodium diet. IV fluids at 100ml/hr. Daily weights. 

Strict I/O. Monitor for fluid overload. 





Disposition: Switch from SNF to inpatient status. Start telemetry monitoring 

given Hyperkalemia. Re-evaluate on rounds tomorrow.

## 2019-07-29 LAB
ANION GAP SERPL CALC-SCNC: 13.8 MMOL/L (ref 5–15)
CHLORIDE SERPL-SCNC: 110 MMOL/L (ref 98–115)
SODIUM SERPL-SCNC: 140 MMOL/L (ref 136–145)

## 2019-07-29 RX ADMIN — ACETAMINOPHEN SCH MG: 650 TABLET, FILM COATED, EXTENDED RELEASE ORAL at 13:53

## 2019-07-29 RX ADMIN — SODIUM POLYSTYRENE SULFONATE SCH GM: 15 SUSPENSION ORAL; RECTAL at 08:17

## 2019-07-29 RX ADMIN — ACETAMINOPHEN SCH MG: 650 TABLET, FILM COATED, EXTENDED RELEASE ORAL at 22:15

## 2019-07-29 RX ADMIN — INSULIN ASPART SCH UNIT: 100 INJECTION, SUSPENSION SUBCUTANEOUS at 08:16

## 2019-07-29 RX ADMIN — INSULIN ASPART SCH UNIT: 100 INJECTION, SUSPENSION SUBCUTANEOUS at 18:12

## 2019-07-29 RX ADMIN — SODIUM POLYSTYRENE SULFONATE SCH GM: 15 SUSPENSION ORAL; RECTAL at 22:13

## 2019-07-29 RX ADMIN — ACETAMINOPHEN SCH MG: 650 TABLET, FILM COATED, EXTENDED RELEASE ORAL at 06:45

## 2019-07-29 NOTE — PCM.PN
- General Info


Date of Service: 07/29/19


Functional Status: Reports: Pain Controlled, Tolerating Diet.  Denies: New 

Symptoms





- Review of Systems


General: Reports: No Symptoms


HEENT: Reports: No Symptoms


Pulmonary: Reports: No Symptoms


Cardiovascular: Reports: No Symptoms


Gastrointestinal: Reports: Abdominal Pain (mild LUQ abd pain)


Genitourinary: Reports: No Symptoms


Musculoskeletal: Reports: No Symptoms


Skin: Reports: No Symptoms


Neurological: Reports: No Symptoms


Psychiatric: Reports: No Symptoms





- Patient Data


Vitals - Most Recent: 


 Last Vital Signs











Temp  98.4 F   07/29/19 07:00


 


Pulse  64   07/29/19 07:00


 


Resp  20   07/29/19 07:00


 


BP  173/64 H  07/29/19 08:17


 


Pulse Ox  93 L  07/29/19 07:00











Weight - Most Recent: 191 lb 12.8 oz


I&O - Last 24 Hours: 


 Intake & Output











 07/28/19 07/29/19 07/29/19





 22:59 06:59 14:59


 


Intake Total 560 1930 


 


Output Total 700 1800 


 


Balance -140 130 











Lab Results Last 24 Hours: 


 Laboratory Results - last 24 hr











  07/28/19 07/29/19 07/29/19 Range/Units





  17:46 07:30 07:30 


 


WBC   8.96   (5.00-10.00)  10^3/uL


 


RBC   3.42 L   (3.80-5.50)  10^6/uL


 


Hgb   9.9 L   (12.0-16.0)  g/dL


 


Hct   30.2 L   (37.0-47.0)  %


 


MCV   88.3  D   (82.0-92.0)  fL


 


MCH   28.9   (27.0-31.0)  pg


 


MCHC   32.8   (32.0-36.0)  g/dL


 


RDW   13.6   (11.5-14.5)  %


 


Plt Count   187   (150-400)  10^3/uL


 


MPV   8.6   (7.4-10.4)  fL


 


Immature Gran % (Auto)   0.1   (0.0-5.0)  %


 


Neut % (Auto)   58.5   (50.0-70.0)  %


 


Lymph % (Auto)   33.9   (20.0-40.0)  %


 


Mono % (Auto)   4.4   (2.0-8.0)  %


 


Eos % (Auto)   2.8   (1.0-3.0)  %


 


Baso % (Auto)   0.3   (0.0-1.0)  %


 


Immature Gran # (Auto)   0.01   (0.00-0.50)  10^3/uL


 


Neut # (Auto)   5.24   (2.50-7.00)  10^3/uL


 


Lymph # (Auto)   3.04   (1.00-4.00)  10^3/uL


 


Mono # (Auto)   0.39   (0.10-0.80)  10^3/uL


 


Eos # (Auto)   0.25   (0.10-0.30)  10^3/uL


 


Baso # (Auto)   0.03   (0.00-0.10)  10^3/uL


 


Sodium    140  (136-145)  mmol/L


 


Potassium    5.3  (3.3-5.3)  mmol/L


 


Chloride    110  ()  mmol/L


 


Carbon Dioxide    21.5  (21.0-32.0)  mmol/L


 


Anion Gap    13.8  (5-15)  mmol/L


 


BUN    27 H  (6-25)  mg/dL


 


Creatinine    0.89  (0.51-1.17)  mg/dL


 


Est Cr Clr Drug Dosing    45.18  mL/min


 


Estimated GFR (MDRD)    > 60  mL/min


 


Glucose    101 H (75 - 99) mg/dL


 


POC Glucose  110 H    ()  mg/dl


 


Calcium    8.3 L  (8.7-10.3)  mg/dL


 


Total Bilirubin    0.1 L  (0.2-1.0)  mg/dL


 


AST    16  (15-37)  U/L


 


ALT    16  (12-78)  U/L


 


Alkaline Phosphatase    63  ()  IU/L


 


Total Protein    5.8 L  (6.4-8.2)  g/dL


 


Albumin    2.85 L  (3.00-4.80)  g/dL














  07/29/19 Range/Units





  07:43 


 


WBC   (5.00-10.00)  10^3/uL


 


RBC   (3.80-5.50)  10^6/uL


 


Hgb   (12.0-16.0)  g/dL


 


Hct   (37.0-47.0)  %


 


MCV   (82.0-92.0)  fL


 


MCH   (27.0-31.0)  pg


 


MCHC   (32.0-36.0)  g/dL


 


RDW   (11.5-14.5)  %


 


Plt Count   (150-400)  10^3/uL


 


MPV   (7.4-10.4)  fL


 


Immature Gran % (Auto)   (0.0-5.0)  %


 


Neut % (Auto)   (50.0-70.0)  %


 


Lymph % (Auto)   (20.0-40.0)  %


 


Mono % (Auto)   (2.0-8.0)  %


 


Eos % (Auto)   (1.0-3.0)  %


 


Baso % (Auto)   (0.0-1.0)  %


 


Immature Gran # (Auto)   (0.00-0.50)  10^3/uL


 


Neut # (Auto)   (2.50-7.00)  10^3/uL


 


Lymph # (Auto)   (1.00-4.00)  10^3/uL


 


Mono # (Auto)   (0.10-0.80)  10^3/uL


 


Eos # (Auto)   (0.10-0.30)  10^3/uL


 


Baso # (Auto)   (0.00-0.10)  10^3/uL


 


Sodium   (136-145)  mmol/L


 


Potassium   (3.3-5.3)  mmol/L


 


Chloride   ()  mmol/L


 


Carbon Dioxide   (21.0-32.0)  mmol/L


 


Anion Gap   (5-15)  mmol/L


 


BUN   (6-25)  mg/dL


 


Creatinine   (0.51-1.17)  mg/dL


 


Est Cr Clr Drug Dosing   mL/min


 


Estimated GFR (MDRD)   mL/min


 


Glucose  (75 - 99) mg/dL


 


POC Glucose  102  ()  mg/dl


 


Calcium   (8.7-10.3)  mg/dL


 


Total Bilirubin   (0.2-1.0)  mg/dL


 


AST   (15-37)  U/L


 


ALT   (12-78)  U/L


 


Alkaline Phosphatase   ()  IU/L


 


Total Protein   (6.4-8.2)  g/dL


 


Albumin   (3.00-4.80)  g/dL











Med Orders - Current: 


 Current Medications





Acetaminophen (Tylenol Arthritis Pain)  650 mg PO Q8HR [facility]


   Last Admin: 07/29/19 06:45 Dose:  650 mg


Al Hydroxide/Mg Hydroxide (Mag-Al Plus)  30 ml PO QID PRN


   PRN Reason: Heartburn


Amlodipine Besylate (Norvasc)  10 mg PO DAILY [facility]


   Last Admin: 07/29/19 08:17 Dose:  10 mg


Aspirin (Aspirin)  81 mg PO DAILY [facility]


   Last Admin: 07/29/19 08:16 Dose:  81 mg


Carvedilol (Coreg)  25 mg PO BIDAC [facility]


   Last Admin: 07/29/19 06:46 Dose:  25 mg


Citalopram Hydrobromide (Celexa)  20 mg PO DAILY [facility]


   Last Admin: 07/29/19 08:16 Dose:  20 mg


Clonidine HCl (Catapres)  0.1 mg PO Q12H [facility]


   Last Admin: 07/29/19 08:16 Dose:  0.1 mg


Ezetimibe (Zetia)  10 mg PO DAILY [facility]


   Last Admin: 07/29/19 08:16 Dose:  10 mg


Sodium Chloride (Normal Saline)  1,000 mls @ 100 mls/hr IV ASDIRECTED [facility]


   Last Admin: 07/28/19 16:56 Dose:  100 mls/hr


Insulin Aspart (Novolog Mix 70-30)  17 unit SUBCUT BIDMEALS [facility]


   Last Admin: 07/29/19 08:16 Dose:  17 unit


Latanoprost (Xalatan 0.005% Ophth Soln)  0 ml EYERT BEDTIME [facility]


   Last Admin: 07/28/19 21:45 Dose:  1 drop


Melatonin (Melatonin)  3 mg PO BEDTIME PRN


   PRN Reason: Insomnia


Quetiapine Fumarate (Seroquel)  12.5 mg PO BEDTIME [facility]


   Last Admin: 07/28/19 21:44 Dose:  12.5 mg


Sodium Chloride (Saline Flush)  10 ml FLUSH Q8HR PRN


   PRN Reason: keep vein open


Sodium Polystyrene Sulfonate (Kayexalate)  15 gm PO BID [facility]


   Last Admin: 07/29/19 08:17 Dose:  15 gm











- Exam


Quality Assessment: No: Supplemental Oxygen


General: Alert


Neck: Supple


Lungs: Clear to Auscultation, Normal Respiratory Effort


Cardiovascular: Regular Rate, Regular Rhythm


GI/Abdominal Exam: Other (increased bowel tones, slight tenderness ).  No: 

Distended


 (Female) Exam: Deferred


Back Exam: No: CVA Tenderness (L), CVA Tenderness (R)


Neurological: No New Focal Deficit


Psy/Mental Status: Alert, Normal Affect, Normal Mood





- Problem List Review


Problem List Initiated/Reviewed/Updated: Yes





- Plan


Plan:: 


history


Debra is a 74 year old female who was initially admitted to the hospital from an 

outpatient clinic on 7/17/19 due to hypotension, dizziness, and abdominal pain. 

Patient was previously in [facility] on July 6th and was transferred to 

[place] for dizziness, uncontrolled HTN, uncontrolled DM, and hyperkalemia. 

Patient has a history of CKD, hyperlipidemia, COPD, obesity, HENRY, CAD with CHF 

and was discharged from [facility] on July 8th. At that time patient was 

started on amlodipine 10mg, carvedilol 25mg BID, Clonidine 0.1mg BID, and 

metolazone 2.5mg Monday and Thursday. Metolazone was replaced with Lasix BID. 

Patient did have hyperkalemia during hospitalization which normalized with 

discontinuation of Aldactone and Vasotec. 





During the early morning hours of 7/28/19 the patient began to complain of 

sudden onset of severe abdominal pain, with nausea, vomiting, and frequent 

diarrhea. CBC done at that time with increased WBC at 17.75 however normal 

neutrophil count. Lipase mildly increased at 432. Na-134,  K+4.8, BUN-43, 

Creatinine 1.28. GFR 41. Abdominal assessment with a significantly acutely 

tender abdomen primarily to the LLQ and LUQ but generalized tenderness noted, 

with patient guarding of abdomen. Patient afebrile. She had been having normal, 

regular BM prior to onset of acute tenderness. IV zofran given with limited 

relief initially. Maalox given. Patient was given a 250mL NS bolus x2 and 

started on maintenance fluid. CT of the abdomen pelvis done showing "large 

distal duodenal diverticulum, of uncerain clinical significance, fluid-filled 

but non-obstructed large and small bowel, possible enteritis. Also of note- 

left adrenal lesion with the appearance of a beniggn myelolipoma, without 

hemorrhage. Small fat containing abdominal wall hernia." 





Hospital Problems:


#Colitis, mild pancreatitis- Mild continued nausea. Continue with Zofran PRN. 

Abdominal pain is improved.  


#Hyperkalemia- improved, recheck in am. 


#Hypertension- Her enalapril was dc'd given hyperkalemia. icreased amlodipine. 

Continue on coreg BID. started on clonidine 0.1mg BID. 


#Acute kidney injury- improved. 





FEN: ADA, low potassium, low sodium diet. IV fluids at 100ml/hr. Daily weights. 

Strict I/O. Monitor for fluid overload. Anticipate Discharge tomorrow with home 

health and close followup

## 2019-07-30 LAB
ANION GAP SERPL CALC-SCNC: 15.9 MMOL/L (ref 5–15)
CHLORIDE SERPL-SCNC: 108 MMOL/L (ref 98–115)
SODIUM SERPL-SCNC: 142 MMOL/L (ref 136–145)

## 2019-07-30 RX ADMIN — SODIUM POLYSTYRENE SULFONATE SCH GM: 15 SUSPENSION ORAL; RECTAL at 08:06

## 2019-07-30 RX ADMIN — ACETAMINOPHEN SCH MG: 650 TABLET, FILM COATED, EXTENDED RELEASE ORAL at 06:14

## 2019-07-30 RX ADMIN — INSULIN ASPART SCH UNIT: 100 INJECTION, SUSPENSION SUBCUTANEOUS at 08:07

## 2019-07-30 NOTE — PCM.DCSUM1
**Discharge Summary





- Hospital Course


Diagnosis: Stroke: No





- Discharge Data


Discharge Date: 07/30/19


Discharge Disposition: Home, Self-Care 01


Condition: Good





- Patient Instructions


Diet: Drink 8-10+ Glasses/Day, Diabetic Diet


Diet, Other: low potassium diet


Activity: As Tolerated


Driving: May Drive Today


Showering/Bathing: May Shower


Notify Provider of: Fever, Increased Pain, Nausea and/or Vomiting





- Discharge Plan


*PRESCRIPTION DRUG MONITORING PROGRAM REVIEWED*: Not Applicable


*COPY OF PRESCRIPTION DRUG MONITORING REPORT IN PATIENT ABDIEL: Not Applicable


Prescriptions/Med Rec: 


amLODIPine [Norvasc] 10 mg PO DAILY #90 tablet


cloNIDine HCl [Clonidine HCl ER] 0.1 mg PO BID #60 tab.er.12h


Insuln Asp Prot/Insulin Aspart [NovoLOG Mix 70-30] 17 unit SUBCUT BIDMEALS #3 

pen


Home Medications: 


 Home Meds





Acetaminophen with Codeine [Acetaminophen-Cod #3] 1 - 2 tab PO Q6H PRN 06/14/19 

[History]


Carvedilol 25 mg PO BIDAC 06/14/19 [History]


Citalopram [Citalopram HBr] 20 mg PO DAILY 06/14/19 [History]


Lancets 1 each MC TID 06/14/19 [History]


Omega-3 Fatty Acids/DHA/EPA [Ovega-3 Softgel] 1,000 mg PO BID 06/14/19 [History]


Aspirin 81 mg PO DAILY 07/18/19 [History]


Calcium Carbonate/Vitamin D3 [Calcium 600-Vit D3 400 Tablet] 1 each PO BIDMEALS 

07/18/19 [History]


Ezetimibe 1 tab PO DAILY 07/18/19 [History]


Iron Polysaccharide Complex [Poly-Iron] 1 cap PO ACBREAKFAST 07/18/19 [History]


Latanoprost 1 drop OP BEDTIME 07/18/19 [History]


Niacinamide [Niacin] 500 mg PO TID 07/18/19 [History]


Nitroglycerin [Nitrostat] 0.4 mg SL ASDIRECTED 07/18/19 [History]


QUEtiapine [SEROquel] 0.5 tab PO BEDTIME 07/18/19 [History]


Acetaminophen [Tylenol Arthritis] 650 mg PO Q8HR 07/28/19 [History]


Alum Hydrox/Mag Hydrox/Simeth [Mag-Al Plus] 30 ml PO QID PRN 07/28/19 [History]


[place]/Min Oil/Flower/[place]ool Alcoh [Eucerin Creme] 1 applic TOP TID PRN 07/28/19 [

History]


Sennosides/Docusate Sodium [Senna-Docusate Sodium Tablet] 4 tab PO BID 07/28/19 

[History]


Insuln Asp Prot/Insulin Aspart [NovoLOG Mix 70-30] 17 unit SUBCUT BIDMEALS #3 

pen 07/30/19 [Rx]


amLODIPine [Norvasc] 10 mg PO DAILY #90 tablet 07/30/19 [Rx]


cloNIDine HCl [Clonidine HCl ER] 0.1 mg PO BID #60 tab.er.12h 07/30/19 [Rx]








Referrals: 


Connor Cuellar NP [Nurse Practitioner] -  (or anybody of her choice in 

[place] or [place] next week. )





- Discharge Summary/Plan Comment


DC Time >30 min.: Yes


Discharge Summary/Plan Comment: 


Final diagnosis


Colitis, mild pancreatitis- improved


Hyperkalemia- resolved


Hypertension- improved 


Acute kidney injury, improved








History


74 year old female who was initially admitted to the hospital from an 

outpatient clinic on 7/17/19 due to hypotension, dizziness, and abdominal pain. 

Patient was previously in [facility] on July 6th and was transferred to 

[place] for dizziness, uncontrolled HTN, uncontrolled DM, and hyperkalemia. 

Patient has a history of CKD, hyperlipidemia, COPD, obesity, HENRY, CAD with CHF 

and was discharged from [facility] on July 8th. At that time patient was 

started on amlodipine 10mg, carvedilol 25mg BID, Clonidine 0.1mg BID, and 

metolazone 2.5mg Monday and Thursday. Metolazone was replaced with Lasix BID. 

Patient did have hyperkalemia during hospitalization which normalized with 

discontinuation of Aldactone and Vasotec. 





Hospital course.  


Patient's initial course prior to placing in swing bed was going well. She was 

given laxative early on in admission and she had multiple stools with 

improvement in abdominal distention. She had x-ray imaging of the abdomen done 

which demonstrated significant gastric and small bowel distention. Patient did 

improve clinically in this regard. She did previously have an VARSHA likely from 

recent contrast media injury. IV fluids were given on admit and her BUN and 

creatinine trended down nicely. BP has improved overall and patient had been 

taking Amlodipine 5mg daily and enalapril 10mg BID. Blood sugar control has 

improved. Patient was transferred to SNF here at [facility] for medication 

management, ambulation, rebuilding her strength, monitoring her blood sugars 

and adjusting medications however she started having some severe abdominal pain

, with nausea, vomiting, and frequent diarrhea on July 28th that she described 

as sudden in onset.  Her CBC done at that time with increased WBC at 17.75 

however normal neutrophil count. Lipase mildly increased at 432. Na-134,  K+4.8

, BUN-43, Creatinine 1.28. GFR 41. Abdominal assessment with a significantly 

acutely tender abdomen primarily to the LLQ and LUQ but generalized tenderness 

noted, with patient guarding of abdomen. Patient was afebrile. She had been 

having normal, regular BM prior to onset of acute tenderness. IV zofran given 

with limited relief initially. Maalox given. Patient was given a 250mL NS bolus 

x2 and started on maintenance fluid. CT of the abdomen pelvis done showing 

"large distal duodenal diverticulum, of uncerain clinical significance, fluid-

filled but non-obstructed large and small bowel, possible enteritis. Also of 

note- left adrenal lesion with the appearance of a beniggn myelolipoma, without 

hemorrhage. Small fat containing abdominal wall hernia." Repeat labwork done in 

the AM with mild improvement in renal function with BUN 40, creatinine 1.19, 

sodium 124. Potassium noted to be elevated at 6.0. This did normalized with 

administration of Kayexal Telemetry did not show any cortical disturbance. She 

was also changed back to Inpatient status. We maintained ADA, low potassium, 

low sodium diet. IV fluids and she felt much improved and was ready for 

discharge to home health. 





medication changes/adjustments sotero discharge


--Enalapril discontinued due to hyperkalemia


--Added clonidine 0.1 mg by mouth twice a day


--Increased Norvasc to 10 mg by mouth daily





disposition


this is a face-to examination and the patient will be discharged to home health

, physical therapy and occupational therapy. patient is high risk for potential 

readmission 2/2  her diabetes and insulin management along with her strength 

and endurance and chronic medical comorbidities.  please develop an in-home 

program therapyt for her.  She is homebound due to her limited decrease and 

indurance and with her unsteady gait. 





- Patient Data


Vitals - Most Recent: 


 Last Vital Signs











Temp  99.1 F   07/30/19 06:24


 


Pulse  67   07/30/19 07:50


 


Resp  20   07/30/19 06:24


 


BP  179/73 H  07/30/19 08:07


 


Pulse Ox  92 L  07/30/19 06:24











Weight - Most Recent: 191 lb 12.8 oz


I&O - Last 24 hours: 


 Intake & Output











 07/29/19 07/30/19 07/30/19





 22:59 06:59 14:59


 


Intake Total 1118 805 


 


Output Total  1000 


 


Balance 1118 -195 











Lab Results - Last 24 hrs: 


 Laboratory Results - last 24 hr











  07/29/19 07/29/19 07/30/19 Range/Units





  11:32 16:55 06:17 


 


Sodium     (136-145)  mmol/L


 


Potassium     (3.3-5.3)  mmol/L


 


Chloride     ()  mmol/L


 


Carbon Dioxide     (21.0-32.0)  mmol/L


 


Anion Gap     (5-15)  mmol/L


 


BUN     (6-25)  mg/dL


 


Creatinine     (0.51-1.17)  mg/dL


 


Est Cr Clr Drug Dosing     mL/min


 


Estimated GFR (MDRD)     mL/min


 


Glucose    (75 - 99) mg/dL


 


POC Glucose  156 H  188 H  84  ()  mg/dl


 


Calcium     (8.7-10.3)  mg/dL














  07/30/19 Range/Units





  08:50 


 


Sodium  142  (136-145)  mmol/L


 


Potassium  4.7  (3.3-5.3)  mmol/L


 


Chloride  108  ()  mmol/L


 


Carbon Dioxide  22.8  (21.0-32.0)  mmol/L


 


Anion Gap  15.9 H  (5-15)  mmol/L


 


BUN  17  (6-25)  mg/dL


 


Creatinine  0.72  (0.51-1.17)  mg/dL


 


Est Cr Clr Drug Dosing  55.85  mL/min


 


Estimated GFR (MDRD)  > 60  mL/min


 


Glucose  213 H (75 - 99) mg/dL


 


POC Glucose   ()  mg/dl


 


Calcium  8.3 L  (8.7-10.3)  mg/dL











Med Orders - Current: 


 Current Medications





Acetaminophen (Tylenol Arthritis Pain)  650 mg PO Q8HR [facility]


   Last Admin: 07/30/19 06:14 Dose:  650 mg


Al Hydroxide/Mg Hydroxide (Mag-Al Plus)  30 ml PO QID PRN


   PRN Reason: Heartburn


Amlodipine Besylate (Norvasc)  10 mg PO DAILY [facility]


   Last Admin: 07/30/19 08:07 Dose:  10 mg


Aspirin (Aspirin)  81 mg PO DAILY [facility]


   Last Admin: 07/30/19 08:07 Dose:  81 mg


Carvedilol (Coreg)  25 mg PO BIDAC [facility]


   Last Admin: 07/30/19 07:50 Dose:  25 mg


Citalopram Hydrobromide (Celexa)  20 mg PO DAILY [facility]


   Last Admin: 07/30/19 08:07 Dose:  20 mg


Clonidine HCl (Catapres)  0.1 mg PO Q12H [facility]


   Last Admin: 07/30/19 08:06 Dose:  0.1 mg


Ezetimibe (Zetia)  10 mg PO DAILY [facility]


   Last Admin: 07/30/19 08:07 Dose:  10 mg


Sodium Chloride (Normal Saline)  1,000 mls @ 100 mls/hr IV ASDIRECTED [facility]


   Last Admin: 07/30/19 06:35 Dose:  100 mls/hr


Insulin Aspart (Novolog Mix 70-30)  17 unit SUBCUT BIDMEALS [facility]


   Last Admin: 07/30/19 08:07 Dose:  17 unit


Latanoprost (Xalatan 0.005% Ophth Soln)  0 ml EYERT BEDTIME [facility]


   Last Admin: 07/29/19 22:16 Dose:  1 drop


Melatonin (Melatonin)  3 mg PO BEDTIME PRN


   PRN Reason: Insomnia


Quetiapine Fumarate (Seroquel)  12.5 mg PO BEDTIME [facility]


   Last Admin: 07/29/19 22:14 Dose:  12.5 mg


Sodium Chloride (Saline Flush)  10 ml FLUSH Q8HR PRN


   PRN Reason: keep vein open


Sodium Polystyrene Sulfonate (Kayexalate)  15 gm PO BID [facility]


   Last Admin: 07/30/19 08:06 Dose:  15 gm

## 2019-07-31 NOTE — [id number].[id number]
**Discharge Summary





- Hospital Course


HPI Initial Comments: 


Debra is a 74 year old female who was initially admitted to the hospital from an 

outpatient clinic on 7/17/19 due to hypotension, dizziness, and abdominal pain. 

Patient was previously in [facility] on July 6th and was transferred to 

[place] for dizziness, uncontrolled HTN, uncontrolled DM, and hyperkalemia. 

Patient has a history of CKD, hyperlipidemia, COPD, obesity, HENRY, CAD with CHF 

and was discharged from [facility] on July 8th. At that time patient was 

started on amlodipine 10mg, carvedilol 25mg BID, Clonidine 0.1mg BID, and 

metolazone 2.5mg Monday and Thursday. Metolazone was replaced with Lasix BID. 

Patient did have hyperkalemia during hospitalization which normalized with 

discontinuation of Aldactone and Vasotec. 





Hospital course:


Patient's initial course prior to placing in swing bed was going well. She was 

given laxative early on in admission and she had multiple stools with 

improvement in abdominal distention. She had x-ray imaging of the abdomen done 

which demonstrated significant gastric and small bowel distention. Patient did 

improve clinically in this regard. She did previously have an VARSHA likely from 

recent contrast media injury. IV fluids were given on admit and her BUN and 

creatinine trended down nicely. BP has improved overall and patient had been 

taking Amlodipine 5mg daily and enalapril 10mg BID. Blood sugar control has 

improved. Patient was transferred to SNF here at [facility] for medication 

management, ambulation, rebuilding her strength, monitoring her blood sugars 

and adjusting medications. 





During the early morning hours of 7/28/19 the patient began to complain of 

sudden onset of severe abdominal pain, with nausea, vomiting, and frequent 

diarrhea. CBC done at that time with increased WBC at 17.75 however normal 

neutrophil count. Lipase mildly increased at 432. Na-134,  K+4.8, BUN-43, 

Creatinine 1.28. GFR 41. Abdominal assessment with a significant acutely tender 

abdomen primarily to the LLQ and LUQ but generalized tenderness noted, with 

patient guarding of abdomen. Patient afebrile. She had been having normal, 

regular BM prior to onset of acute abdominal pain. IV zofran given with limited 

relief initially. Maalox given. Patient was given a 250mL NS bolus x2 and 

started on maintenance fluid. CT of the abdomen pelvis done showing "large 

distal duodenal diverticulum, of uncertain clinical significance, fluid-filled 

but non-obstructed large and small bowel, possible enteritis. Also of note- 

left adrenal lesion with the appearance of a benign myelolipoma, without 

hemorrhage. Small fat containing abdominal wall hernia." 





Repeat labwork done in the AM with mild improvement in renal function with BUN 

40, creatinine 1.19, sodium 124. Potassium noted to be elevated at 6.0. recheck 

potassium done and 6.0. Given hyperkalemia and concerns for acute on chronic 

kidney injury patient switched from SNF to Inpatient status. 





Hospital Problems:


#Colitis, mild pancreatitis- Mild continued nausea. Zofran PRN. Abdominal pain 

is improved although quite tender to palpation. 


#Hyperkalemia- Will proceed with kaexylate x2 doses with recheck in AM. On 

telemetry. 


#Hypertension- discontinue enalapril given hyperkalemia. Will increased 

amlodipine. Continue on coreg BID. Will start clonidine 0.1mg BID. 


#Acute kidney injury- continue with IV therapy. BNP done today given underlying 

CHF and normal at 17. chest x-ray done with cardiomegaly without acute 

exacerbation. Recheck renal function in the AM





Chronic conditions-


#CAD- On ASA therapy


#CHF- BNP 17


#HLD- intolerant to statins. On ezetimibe


#SUDHEER- On Niferex


#DM- Novolog 70/30 BID


#Constipation- on Senna- will hold given current diarrhea. 


#CKD


#Depression and Anxiety- Celexa


#Hyponatremia- 








Disposition: Switch from SNF to inpatient status. Start telemetry monitoring 

given Hyperkalemia. Re-evaluate on rounds tomorrow. 














- Discharge Data


Discharge Date: 07/28/19


Discharge Disposition: Admitted As Inpatient 65


Condition: Good





- Discharge Diagnosis/Problem(s)


(1) Hyperkalemia


SNOMED Code(s): 92325673


   ICD Code: E87.5 - HYPERKALEMIA   Status: Acute   Priority: Medium   Onset 

Date: 06/18/19   Problem Details: Mild persistent hyperkalemia despite previous 

increased Lasix therapy after increase of her previous spironolactone. Has 

received Kayexalate periodically to help with elevated levels.  5.8 yesterday.  

5.1 today after Kayexalate.  


6.6 was highest level on 6/27


Lisinopril placed on hold yesterday.    





(2) Hypertension


SNOMED Code(s): 83158115


   ICD Code: I10 - ESSENTIAL (PRIMARY) HYPERTENSION   Status: Chronic   Priority

: Medium   Problem Details: Blood pressures improved overall but continue to 

have wide swings between normotensive levels and levels where systolic BP 

remains in the 180s.  Lisinopril currently on hold due to persistent issues 

with hyperkalemia.    


Qualifiers: 


   Hypertension type: essential hypertension   Qualified Code(s): I10 - 

Essential (primary) hypertension   





(3) Renal insufficiency


SNOMED Code(s): 380713944, 506533754


   ICD Code: N28.9 - DISORDER OF KIDNEY AND URETER, UNSPECIFIED   Status: 

Chronic   Priority: Medium   Problem Details: Mildly progressive diabetic 

nephropathy noted.    





- Patient Instructions


Diet: Heart Healthy Diet, Low Sodium, Renal Diet


Notify Provider of: Fever, Increased Pain, Nausea and/or Vomiting





- Discharge Plan


Home Medications: 


 Home Meds





Acetaminophen with Codeine [Acetaminophen-Cod #3] 1 - 2 tab PO Q6H PRN 06/14/19 

[History]


Carvedilol 25 mg PO BIDAC 06/14/19 [History]


Citalopram [Citalopram HBr] 20 mg PO DAILY 06/14/19 [History]


Lancets 1 each MC TID 06/14/19 [History]


Omega-3 Fatty Acids/DHA/EPA [Ovega-3 Softgel] 1,000 mg PO BID 06/14/19 [History]


Aspirin 81 mg PO DAILY 07/18/19 [History]


Calcium Carbonate/Vitamin D3 [Calcium 600-Vit D3 400 Tablet] 1 each PO BIDMEALS 

07/18/19 [History]


Ezetimibe 1 tab PO DAILY 07/18/19 [History]


Iron Polysaccharide Complex [Poly-Iron] 1 cap PO ACBREAKFAST 07/18/19 [History]


Latanoprost 1 drop OP BEDTIME 07/18/19 [History]


Niacinamide [Niacin] 500 mg PO TID 07/18/19 [History]


Nitroglycerin [Nitrostat] 0.4 mg SL ASDIRECTED 07/18/19 [History]


QUEtiapine [SEROquel] 0.5 tab PO BEDTIME 07/18/19 [History]


Acetaminophen [Tylenol Arthritis] 650 mg PO Q8HR 07/28/19 [History]


Alum Hydrox/Mag Hydrox/Simeth [Mag-Al Plus] 30 ml PO QID PRN 07/28/19 [History]


[place]/Min Oil/Flower/Wool Alcoh [Eucerin Creme] 1 applic TOP TID PRN 07/28/19 [

History]


Sennosides/Docusate Sodium [Senna-Docusate Sodium Tablet] 4 tab PO BID 07/28/19 

[History]


Insuln Asp Prot/Insulin Aspart [NovoLOG Mix 70-30] 17 unit SUBCUT BIDMEALS #3 

pen 07/30/19 [Rx]


amLODIPine [Norvasc] 10 mg PO DAILY #90 tablet 07/30/19 [Rx]


cloNIDine HCl [Clonidine HCl ER] 0.1 mg PO BID #60 tab.er.12h 07/30/19 [Rx]











- Discharge Summary/Plan Comment


DC Time >30 min.: Yes





- General Info


Date of Service: 07/28/19


Admission Dx/Problem (Free Text: 


 Admission Diagnosis/Problem





Admission Diagnosis/Problem      Weakness of both lower extremities











- Review of Systems


General: Reports: Appetite.  Denies: Fever


HEENT: Reports: No Symptoms


Pulmonary: Reports: No Symptoms


Cardiovascular: Reports: No Symptoms


Gastrointestinal: Reports: Abdominal Pain, Diarrhea, Nausea, Vomiting


Genitourinary: Reports: No Symptoms


Musculoskeletal: Reports: No Symptoms


Skin: Reports: No Symptoms


Neurological: Reports: No Symptoms


Psychiatric: Reports: No Symptoms





- Patient Data


Vitals - Most Recent: 


 Last Vital Signs











Temp  96.9 F   07/28/19 06:37


 


Pulse  58 L  07/28/19 06:40


 


Resp  12   07/28/19 06:37


 


BP  152/76 H  07/28/19 12:32


 


Pulse Ox  92 L  07/28/19 06:37











Weight - Most Recent: 189 lb 6 oz


Med Orders - Current: 


 Current Medications








Discontinued Medications





Acetaminophen (Tylenol Arthritis Pain)  650 mg PO Q8H [facility]


   Last Admin: 07/22/19 05:04 Dose:  Not Given


Acetaminophen (Tylenol Arthritis Pain)  650 mg PO Q8H [facility]


   Last Admin: 07/28/19 06:39 Dose:  650 mg


Al Hydroxide/Mg Hydroxide (Mag-Al Plus)  30 ml PO QID PRN


   PRN Reason: heartburn/upset stomach


   Last Admin: 07/28/19 00:20 Dose:  30 ml


Amlodipine Besylate (Norvasc)  5 mg PO DAILY [facility]


   Last Admin: 07/28/19 08:26 Dose:  5 mg


Amlodipine Besylate (Norvasc)  5 mg PO ONETIME ONE


   Stop: 07/28/19 12:14


   Last Admin: 07/28/19 12:32 Dose:  5 mg


Amlodipine Besylate (Norvasc)  10 mg PO DAILY [facility]


Aspirin (Halfprin)  81 mg PO DAILY [facility]


   Last Admin: 07/28/19 08:26 Dose:  81 mg


Carvedilol (Coreg)  25 mg PO BIDAC [facility]


   Last Admin: 07/28/19 06:40 Dose:  25 mg


Citalopram Hydrobromide (Celexa)  20 mg PO DAILY [facility]


   Last Admin: 07/28/19 08:26 Dose:  20 mg


Ezetimibe (Zetia)  10 mg PO DAILY [facility]


   Last Admin: 07/28/19 08:26 Dose:  10 mg


Enalapril Maleate (Vasotec)  10 mg PO BID [facility]


   Last Admin: 07/28/19 08:25 Dose:  10 mg


Sodium Chloride (Normal Saline)  1,000 mls @ 70 mls/hr IV ASDIRECTED [facility]


Sodium Chloride (Normal Saline)  250 mls @ 999 mls/hr IV ONETIME ONE


   Stop: 07/28/19 02:15


   Last Admin: 07/28/19 02:52 Dose:  Not Given


Sodium Chloride (Normal Saline)  1,000 mls @ 70 mls/hr IV ASDIRECTED [facility]


   Last Admin: 07/28/19 02:48 Dose:  70 mls/hr


Sodium Chloride (Normal Saline)  50 mls @ 200 mls/min IV ASDIRECTED [facility]


   Last Admin: 07/28/19 03:21 Dose:  200 mls/min


Insulin Aspart (Novolog Mix 70-30)  15 unit SUBCUT BIDMEALS [facility]


   Last Admin: 07/23/19 08:09 Dose:  15 units


Insulin Aspart (Novolog Mix 70-30)  17 unit SUBCUT BIDMEALS [facility]


   Last Admin: 07/28/19 08:50 Dose:  17 unit


Insulin Human Regular (Novolin R)  15 unit SUBCUT BIDMEALS [facility]; Protocol


Iopamidol (Isovue-370 (76%))  75 ml IVPUSH ONETIME ONE


   Stop: 07/28/19 02:46


   Last Admin: 07/28/19 03:21 Dose:  75 ml


Latanoprost (Xalatan 0.005% Ophth Soln)  0 ml EYEBOTH BEDTIME [facility]


   Last Admin: 07/27/19 22:16 Dose:  1 drop


Multi-Ingredient Ointment (Eucerin Creme)  0 gm TOP TID PRN


   PRN Reason: cracked heels


   Last Admin: 07/21/19 21:07 Dose:  1 applic


Multi-Ingredient Ointment (Eucerin Creme)  0 gm TOP TID PRN


   PRN Reason: cracked heels


   Last Admin: 07/26/19 18:36 Dose:  1 applic


Ondansetron HCl (Zofran)  4 mg IV Q4H PRN


   PRN Reason: Nausea/Vomiting


   Last Admin: 07/28/19 00:45 Dose:  4 mg


Quetiapine Fumarate (Seroquel)  12.5 mg PO BEDTIME [facility]


   Last Admin: 07/27/19 22:16 Dose:  12.5 mg


Senna/Docusate Sodium (Senna Plus)  4 tab PO BID [facility]


   Last Admin: 07/28/19 09:21 Dose:  Not Given


Sodium Chloride (Saline Flush)  10 ml FLUSH Q8HR PRN


   PRN Reason: keep vein open


Sodium Chloride (Saline Flush)  10 ml FLUSH Q8HR PRN


   PRN Reason: keep vein open











- Exam


General: Reports: Alert, Oriented


Neck: Reports: Supple, No JVD


Lungs: Reports: Clear to Auscultation, Normal Respiratory Effort


Cardiovascular: Reports: Regular Rate, Regular Rhythm


GI/Abdominal Exam: Normal Bowel Sounds, Guarding, Tender, Other (generalized 

abdominal tenderness. increased pain to LUQ and LLQ)


 (Female) Exam: Deferred


Rectal (Female) Exam: Deferred


Extremities: No Pedal Edema


Skin: Reports: Warm, Dry, Intact


Neurological: Reports: No New Focal Deficit


Psy/Mental Status: Reports: Alert

## 2019-10-31 ENCOUNTER — HOSPITAL ENCOUNTER (OUTPATIENT)
Dept: HOSPITAL 77 - KA.MS | Age: 74
Setting detail: OBSERVATION
LOS: 1 days | Discharge: HOME HEALTH | End: 2019-11-01
Attending: NURSE PRACTITIONER | Admitting: NURSE PRACTITIONER
Payer: MEDICARE

## 2019-10-31 DIAGNOSIS — E11.22: ICD-10-CM

## 2019-10-31 DIAGNOSIS — Z87.891: ICD-10-CM

## 2019-10-31 DIAGNOSIS — F41.8: ICD-10-CM

## 2019-10-31 DIAGNOSIS — R41.82: ICD-10-CM

## 2019-10-31 DIAGNOSIS — I67.82: ICD-10-CM

## 2019-10-31 DIAGNOSIS — Z88.8: ICD-10-CM

## 2019-10-31 DIAGNOSIS — I13.0: ICD-10-CM

## 2019-10-31 DIAGNOSIS — E11.40: ICD-10-CM

## 2019-10-31 DIAGNOSIS — Z79.891: ICD-10-CM

## 2019-10-31 DIAGNOSIS — Z79.899: ICD-10-CM

## 2019-10-31 DIAGNOSIS — E11.65: ICD-10-CM

## 2019-10-31 DIAGNOSIS — E78.5: ICD-10-CM

## 2019-10-31 DIAGNOSIS — G47.33: ICD-10-CM

## 2019-10-31 DIAGNOSIS — N18.3: ICD-10-CM

## 2019-10-31 DIAGNOSIS — Z79.82: ICD-10-CM

## 2019-10-31 DIAGNOSIS — I50.9: ICD-10-CM

## 2019-10-31 DIAGNOSIS — Z91.81: ICD-10-CM

## 2019-10-31 DIAGNOSIS — Z99.89: ICD-10-CM

## 2019-10-31 DIAGNOSIS — R29.6: ICD-10-CM

## 2019-10-31 DIAGNOSIS — J44.9: ICD-10-CM

## 2019-10-31 DIAGNOSIS — Z79.4: ICD-10-CM

## 2019-10-31 DIAGNOSIS — Z91.048: ICD-10-CM

## 2019-10-31 DIAGNOSIS — E66.9: ICD-10-CM

## 2019-10-31 DIAGNOSIS — I25.10: ICD-10-CM

## 2019-10-31 DIAGNOSIS — T65.91XA: Primary | ICD-10-CM

## 2019-10-31 PROCEDURE — G0378 HOSPITAL OBSERVATION PER HR: HCPCS

## 2019-10-31 RX ADMIN — INSULIN ASPART SCH UNIT: 100 INJECTION, SUSPENSION SUBCUTANEOUS at 18:17

## 2019-10-31 NOTE — CT
______________________________________________________________________________   

  

[id number] CT/CT Head WO IV  

EXAM: NONCONTRAST HEAD CT  

   

 INDICATION: Altered mental status.  

   

 COMPARISON: None.  

   

 DISCUSSION:   

 Mild to moderate generalized atrophy.  

   

 Mild multifocal white matter hypoattenuation is nonspecific, but generally  

 ascribed to chronic small vessel ischemia.  

   

 No mass effect or midline shift.  

   

 No acute hemorrhage or extra-axial fluid collection.  

   

 No acute territorial infarct is identified.  

   

 Possible surgical changes left mastoid air cells. There are few opacified right  

 mastoid air cells.  

   

 IMPRESSION:    

 1.  No acute intracranial findings.  

   

 Electronically signed by Baltazar Delcid MD on 10/31/2019 4:37 PM  

   

  

Baltazar Delcid MD                 

 10/31/19 1640    

  

Thank you for allowing us to participate in the care of your patient.

## 2019-11-01 LAB
ANION GAP SERPL CALC-SCNC: 15.5 MMOL/L (ref 5–15)
BARBITURATES UR QL SCN: NEGATIVE
BENZODIAZ UR QL SCN: NEGATIVE
CHLORIDE SERPL-SCNC: 100 MMOL/L (ref 98–115)
SODIUM SERPL-SCNC: 135 MMOL/L (ref 136–145)
TCA UR-MCNC: NEGATIVE UG/ML
THC UR QL SCN>50 NG/ML: NEGATIVE

## 2019-11-01 RX ADMIN — INSULIN ASPART SCH UNIT: 100 INJECTION, SUSPENSION SUBCUTANEOUS at 08:05

## 2019-11-01 NOTE — [id number].[id number]
**Discharge Summary





- Hospital Course


Diagnosis: Stroke: No





- Discharge Data


Discharge Date: 11/01/19


Discharge Disposition: Home, W Home Health Agency [facility]


Condition: Good





- Referral to Home Health


Date of Face to Face Encounter: 11/01/19


Reason for Homebound Status: alt mental status, freq falls.


Primary Care Physician: 


Shira Lucero NP





Skilled Need: medication set up.  VS.  Assess safety,.  Assess of her mental 

status





- Patient Summary/Data


Consults: 


 Consultations





10/31/19 14:48


Consult to Case Management/ [CONS] Routine 





10/31/19 17:36


Consult to Case Management/ [CONS] Routine 














- Patient Instructions


Diet: Usual Diet as Tolerated, Drink 8-10+ Glasses/Day


Activity: As Tolerated


Driving: Do Not Drive


Showering/Bathing: May Shower


Notify Provider of: Fever, Nausea and/or Vomiting


Other/Special Instructions: Report any worsening confusion or falls.  Home 

Health to set up medication.  DO NOT DRIVE





- Discharge Plan


*PRESCRIPTION DRUG MONITORING PROGRAM REVIEWED*: Not Applicable


*COPY OF PRESCRIPTION DRUG MONITORING REPORT IN PATIENT ABDIEL: Not Applicable


Home Medications: 


 Home Meds





Acetaminophen with Codeine [Acetaminophen-Cod #3] 1 - 2 tab PO Q6H PRN 06/14/19 

[History]


Carvedilol 25 mg PO BIDAC 06/14/19 [History]


Citalopram [Citalopram HBr] 20 mg PO DAILY 06/14/19 [History]


Lancets 1 each MC TID 06/14/19 [History]


Omega-3 Fatty Acids/DHA/EPA [Ovega-3 Softgel] 1,000 mg PO BID 06/14/19 [History]


Aspirin 81 mg PO DAILY 07/18/19 [History]


Calcium Carbonate/Vitamin D3 [Calcium 600-Vit D3 400 Tablet] 1 each PO BIDMEALS 

07/18/19 [History]


Ezetimibe 10 mg PO DAILY 07/18/19 [History]


Iron Polysaccharide Complex [Poly-Iron] 150 mg PO ACBREAKFAST 07/18/19 [History]


Latanoprost 1 drop EYEBOTH BEDTIME 07/18/19 [History]


Niacinamide [Niacin] 500 mg PO BID 07/18/19 [History]


Nitroglycerin [Nitrostat] 0.4 mg SL ASDIRECTED 07/18/19 [History]


QUEtiapine [SEROquel] 12.5 tab PO BEDTIME 07/18/19 [History]


Insuln Asp Prot/Insulin Aspart [NovoLOG Mix 70-30] 17 unit SUBCUT BIDMEALS #3 

pen 07/30/19 [Rx]








Referrals: 


Connor Cuellar, NP [Nurse Practitioner] - 11/04/19 10:30 am (follow-up in 

[place])





- Discharge Summary/Plan Comment


DC Time >30 min.: Yes


Discharge Summary/Plan Comment: 


Final diagnosis


Altered mental status, medication drug overuse


Frequent falls,


Chronic small vessel ischemic cerebral








History summary


Debra is a 74-year-old female was seen and evaluated by Shira MATHUR the day of 

admission at [facility] and she had came in acutely due to mixing up her 

medications.  Apparently the patient took 2 days worth her for her morning 

medications at some point during the night and when her brother checked on her 

today she was difficult to arouse and was still sleeping.  She  does have for 

medication set up independently in pill boxes .  Although she does have mild 

altered mental status upon admission, she has no acute anxiety, diaphoresis or 

headache.  I have been following Debra for several months, in which I had placed 

the patient in the hospital acutely for dizziness and hypotension and confusion 

and subsequently placed her in SNF at [facility] and she did quite well.  I 

have been concerned about the patient with medication mixups in the past along 

with frequent falls and the need for more than home health nursing suggesting 

LTC however she has quite adamantly refused.  Her family has been more vocal to 

me regarding long-term placement however they have been reticent in expressing 

their concerns and desires to the patient.  CT of the brain June 2019 due to 

dizziness and giddiness which revealed some generalized cerebral and cortical 

atrophy along with evidence of chronic small vessel ischemic changes.  She was 

admitted into observation mainly for monitoring due to her medication overuse 

such as non-benzo antipsychotics, beta blocker, and SSRI meds.





Hospital course


Uneventful, she did not sleep well, she had rather been home in her own bed, 

there were no concerning ECG or telemetry. Telemetry were removed before 

midnight. Improved in her altered mental status, vital signs are stable, mild 

WBC elevation however no leukocytosis or fever. EKG:  SR  no QTc ~430, urine 

toxicology was negative.











Disposition


She is refusing long-term care, will discharge with home health and close follow

-up at the clinic.  Pharmacy today we will set her up with either blister pill 

packs or refill her medicine containers.  Only concern regarding patient's 

ability to care for herself at home due to frequent falls, ental status and 

medication mixups.  Strongly recommend long-term care especially during the 

winter months.  See her back next week for follow-up. 











- General Info


Functional Status: Reports: Pain Controlled, Tolerating Diet, Ambulating.  

Denies: New Symptoms





- Review of Systems


General: Denies: Fever, Weakness


HEENT: Reports: No Symptoms


Pulmonary: Reports: No Symptoms


Cardiovascular: Denies: Chest Pain, Palpitations, Dyspnea on Exertion


Gastrointestinal: Reports: No Symptoms


Genitourinary: Denies: Dysuria


Skin: Denies: Dryness


Neurological: Reports: Gait Disturbance (Mild).  Denies: Confusion, Dizziness, 

Headache, Seizure, Tremors, Change in Speech


Psychiatric: Denies: Confusion, Agitation





- Patient Data


Vitals - Most Recent: 


 Last Vital Signs











Temp  98.4 F   11/01/19 07:00


 


Pulse  70   11/01/19 07:00


 


Resp  20   11/01/19 07:00


 


BP  164/69 H  11/01/19 07:00


 


Pulse Ox  93 L  11/01/19 07:00











Weight - Most Recent: 203 lb 14.4 oz


I&O - Last 24 hours: 


 Intake & Output











 10/31/19 11/01/19 11/01/19





 22:59 06:59 14:59


 


Intake Total 400 100 


 


Output Total 300 1300 


 


Balance 100 -1200 











Lab Results - Last 24 hrs: 


 Laboratory Results - last 24 hr











  10/31/19 10/31/19 10/31/19 Range/Units





  17:59 20:45 20:45 


 


WBC     (5.00-10.00)  10^3/uL


 


RBC     (3.80-5.50)  10^6/uL


 


Hgb     (12.0-16.0)  g/dL


 


Hct     (37.0-47.0)  %


 


MCV     (82.0-92.0)  fL


 


MCH     (27.0-31.0)  pg


 


MCHC     (32.0-36.0)  g/dL


 


RDW     (11.5-14.5)  %


 


Plt Count     (150-400)  10^3/uL


 


MPV     (7.4-10.4)  fL


 


Immature Gran % (Auto)     (0.0-5.0)  %


 


Neut % (Auto)     (50.0-70.0)  %


 


Lymph % (Auto)     (20.0-40.0)  %


 


Mono % (Auto)     (2.0-8.0)  %


 


Eos % (Auto)     (1.0-3.0)  %


 


Baso % (Auto)     (0.0-1.0)  %


 


Immature Gran # (Auto)     (0.00-0.50)  10^3/uL


 


Neut # (Auto)     (2.50-7.00)  10^3/uL


 


Lymph # (Auto)     (1.00-4.00)  10^3/uL


 


Mono # (Auto)     (0.10-0.80)  10^3/uL


 


Eos # (Auto)     (0.10-0.30)  10^3/uL


 


Baso # (Auto)     (0.00-0.10)  10^3/uL


 


Sodium     (136-145)  mmol/L


 


Potassium     (3.3-5.3)  mmol/L


 


Chloride     ()  mmol/L


 


Carbon Dioxide     (21.0-32.0)  mmol/L


 


Anion Gap     (5-15)  mmol/L


 


BUN     (6-25)  mg/dL


 


Creatinine     (0.51-1.17)  mg/dL


 


Est Cr Clr Drug Dosing     mL/min


 


Estimated GFR (MDRD)     mL/min


 


Glucose    (75 - 99) mg/dL


 


POC Glucose  433 H    ()  mg/dl


 


Calcium     (8.7-10.3)  mg/dL


 


Total Bilirubin     (0.2-1.0)  mg/dL


 


AST     (15-37)  U/L


 


ALT     (12-78)  U/L


 


Alkaline Phosphatase     ()  IU/L


 


Total Protein     (6.4-8.2)  g/dL


 


Albumin     (3.00-4.80)  g/dL


 


Specimen Type   Urinvoid   


 


Urine Color   Yellow   (YELLOW)  


 


Urine Appearance   Clear   (CLEAR)  


 


Urine pH   5.0   (5.0-9.0)  


 


Ur Specific Gravity   1.020   (1.005-1.030)  


 


Urine Protein   100 H   (NEGATIVE)  mg/dL


 


Urine Glucose (UA)   >=1000 H   (NEGATIVE)  mg/dL


 


Urine Ketones   Negative   (NEGATIVE)  mg/dL


 


Urine Occult Blood   Negative   (NEGATIVE)  


 


Urine Nitrite   Negative   (NEGATIVE)  


 


Urine Bilirubin   Negative   (NEGATIVE)  


 


Urine Urobilinogen   0.2   (0.2-1.0)  E.U./dL


 


Ur Leukocyte Esterase   Negative   (NEGATIVE)  


 


Urine Opiates Screen    Negative  (NEGATIVE)  


 


Ur Oxycodone Screen    Negative  (NEGATIVE)  


 


Urine Methadone Screen    Negative  (NEGATIVE)  


 


Ur Propoxyphene Screen    Negative  (NEGATIVE)  


 


Ur Barbiturates Screen    Negative  (NEGATIVE)  


 


Ur Tricyclics Screen    Negative  (NEGATIVE)  


 


Ur Phencyclidine Scrn    Negative  (NEGATIVE)  


 


Ur Amphetamine Screen    Negative  (NEGATIVE)  


 


U Methamphetamines Scrn    Negative  (NEGATIVE)  


 


U Benzodiazepines Scrn    Negative  (NEGATIVE)  


 


U Cocaine Metab Screen    Negative  (NEGATIVE)  


 


U Marijuana (THC) Screen    Negative  (NEGATIVE)  














  10/31/19 11/01/19 11/01/19 Range/Units





  21:07 07:52 09:00 


 


WBC     (5.00-10.00)  10^3/uL


 


RBC     (3.80-5.50)  10^6/uL


 


Hgb     (12.0-16.0)  g/dL


 


Hct     (37.0-47.0)  %


 


MCV     (82.0-92.0)  fL


 


MCH     (27.0-31.0)  pg


 


MCHC     (32.0-36.0)  g/dL


 


RDW     (11.5-14.5)  %


 


Plt Count     (150-400)  10^3/uL


 


MPV     (7.4-10.4)  fL


 


Immature Gran % (Auto)     (0.0-5.0)  %


 


Neut % (Auto)     (50.0-70.0)  %


 


Lymph % (Auto)     (20.0-40.0)  %


 


Mono % (Auto)     (2.0-8.0)  %


 


Eos % (Auto)     (1.0-3.0)  %


 


Baso % (Auto)     (0.0-1.0)  %


 


Immature Gran # (Auto)     (0.00-0.50)  10^3/uL


 


Neut # (Auto)     (2.50-7.00)  10^3/uL


 


Lymph # (Auto)     (1.00-4.00)  10^3/uL


 


Mono # (Auto)     (0.10-0.80)  10^3/uL


 


Eos # (Auto)     (0.10-0.30)  10^3/uL


 


Baso # (Auto)     (0.00-0.10)  10^3/uL


 


Sodium    135 L  (136-145)  mmol/L


 


Potassium    4.6  (3.3-5.3)  mmol/L


 


Chloride    100  ()  mmol/L


 


Carbon Dioxide    24.1  (21.0-32.0)  mmol/L


 


Anion Gap    15.5 H  (5-15)  mmol/L


 


BUN    27 H  (6-25)  mg/dL


 


Creatinine    1.04  (0.51-1.17)  mg/dL


 


Est Cr Clr Drug Dosing    36.97  mL/min


 


Estimated GFR (MDRD)    52  mL/min


 


Glucose    235 H (75 - 99) mg/dL


 


POC Glucose  292 H  127 H   ()  mg/dl


 


Calcium    8.8  (8.7-10.3)  mg/dL


 


Total Bilirubin    0.2  (0.2-1.0)  mg/dL


 


AST    9 L  (15-37)  U/L


 


ALT    17  (12-78)  U/L


 


Alkaline Phosphatase    77  ()  IU/L


 


Total Protein    6.4  (6.4-8.2)  g/dL


 


Albumin    2.85 L  (3.00-4.80)  g/dL


 


Specimen Type     


 


Urine Color     (YELLOW)  


 


Urine Appearance     (CLEAR)  


 


Urine pH     (5.0-9.0)  


 


Ur Specific Gravity     (1.005-1.030)  


 


Urine Protein     (NEGATIVE)  mg/dL


 


Urine Glucose (UA)     (NEGATIVE)  mg/dL


 


Urine Ketones     (NEGATIVE)  mg/dL


 


Urine Occult Blood     (NEGATIVE)  


 


Urine Nitrite     (NEGATIVE)  


 


Urine Bilirubin     (NEGATIVE)  


 


Urine Urobilinogen     (0.2-1.0)  E.U./dL


 


Ur Leukocyte Esterase     (NEGATIVE)  


 


Urine Opiates Screen     (NEGATIVE)  


 


Ur Oxycodone Screen     (NEGATIVE)  


 


Urine Methadone Screen     (NEGATIVE)  


 


Ur Propoxyphene Screen     (NEGATIVE)  


 


Ur Barbiturates Screen     (NEGATIVE)  


 


Ur Tricyclics Screen     (NEGATIVE)  


 


Ur Phencyclidine Scrn     (NEGATIVE)  


 


Ur Amphetamine Screen     (NEGATIVE)  


 


U Methamphetamines Scrn     (NEGATIVE)  


 


U Benzodiazepines Scrn     (NEGATIVE)  


 


U Cocaine Metab Screen     (NEGATIVE)  


 


U Marijuana (THC) Screen     (NEGATIVE)  














  11/01/19 Range/Units





  09:00 


 


WBC  12.03 H  (5.00-10.00)  10^3/uL


 


RBC  3.79 L  (3.80-5.50)  10^6/uL


 


Hgb  11.1 L  (12.0-16.0)  g/dL


 


Hct  32.6 L  (37.0-47.0)  %


 


MCV  86.0  (82.0-92.0)  fL


 


MCH  29.3  (27.0-31.0)  pg


 


MCHC  34.0  (32.0-36.0)  g/dL


 


RDW  12.0  (11.5-14.5)  %


 


Plt Count  203  (150-400)  10^3/uL


 


MPV  9.0  (7.4-10.4)  fL


 


Immature Gran % (Auto)  0.2  (0.0-5.0)  %


 


Neut % (Auto)  67.5  (50.0-70.0)  %


 


Lymph % (Auto)  25.7  (20.0-40.0)  %


 


Mono % (Auto)  3.9  (2.0-8.0)  %


 


Eos % (Auto)  2.6  (1.0-3.0)  %


 


Baso % (Auto)  0.1  (0.0-1.0)  %


 


Immature Gran # (Auto)  0.02  (0.00-0.50)  10^3/uL


 


Neut # (Auto)  8.13 H  (2.50-7.00)  10^3/uL


 


Lymph # (Auto)  3.09  (1.00-4.00)  10^3/uL


 


Mono # (Auto)  0.47  (0.10-0.80)  10^3/uL


 


Eos # (Auto)  0.31 H  (0.10-0.30)  10^3/uL


 


Baso # (Auto)  0.01  (0.00-0.10)  10^3/uL


 


Sodium   (136-145)  mmol/L


 


Potassium   (3.3-5.3)  mmol/L


 


Chloride   ()  mmol/L


 


Carbon Dioxide   (21.0-32.0)  mmol/L


 


Anion Gap   (5-15)  mmol/L


 


BUN   (6-25)  mg/dL


 


Creatinine   (0.51-1.17)  mg/dL


 


Est Cr Clr Drug Dosing   mL/min


 


Estimated GFR (MDRD)   mL/min


 


Glucose  (75 - 99) mg/dL


 


POC Glucose   ()  mg/dl


 


Calcium   (8.7-10.3)  mg/dL


 


Total Bilirubin   (0.2-1.0)  mg/dL


 


AST   (15-37)  U/L


 


ALT   (12-78)  U/L


 


Alkaline Phosphatase   ()  IU/L


 


Total Protein   (6.4-8.2)  g/dL


 


Albumin   (3.00-4.80)  g/dL


 


Specimen Type   


 


Urine Color   (YELLOW)  


 


Urine Appearance   (CLEAR)  


 


Urine pH   (5.0-9.0)  


 


Ur Specific Gravity   (1.005-1.030)  


 


Urine Protein   (NEGATIVE)  mg/dL


 


Urine Glucose (UA)   (NEGATIVE)  mg/dL


 


Urine Ketones   (NEGATIVE)  mg/dL


 


Urine Occult Blood   (NEGATIVE)  


 


Urine Nitrite   (NEGATIVE)  


 


Urine Bilirubin   (NEGATIVE)  


 


Urine Urobilinogen   (0.2-1.0)  E.U./dL


 


Ur Leukocyte Esterase   (NEGATIVE)  


 


Urine Opiates Screen   (NEGATIVE)  


 


Ur Oxycodone Screen   (NEGATIVE)  


 


Urine Methadone Screen   (NEGATIVE)  


 


Ur Propoxyphene Screen   (NEGATIVE)  


 


Ur Barbiturates Screen   (NEGATIVE)  


 


Ur Tricyclics Screen   (NEGATIVE)  


 


Ur Phencyclidine Scrn   (NEGATIVE)  


 


Ur Amphetamine Screen   (NEGATIVE)  


 


U Methamphetamines Scrn   (NEGATIVE)  


 


U Benzodiazepines Scrn   (NEGATIVE)  


 


U Cocaine Metab Screen   (NEGATIVE)  


 


U Marijuana (THC) Screen   (NEGATIVE)  











Med Orders - Current: 


 Current Medications





Acetaminophen (Tylenol)  325 - 650 mg PO Q4H PRN


   PRN Reason: Pain


Aspirin (Halfprin)  81 mg PO DAILY [facility]


   Last Admin: 11/01/19 08:06 Dose:  81 mg


Insulin Aspart (Novolog Mix 70-30)  17 unit SUBCUT BIDMEALS [facility]


   Last Admin: 11/01/19 08:05 Dose:  17 unit


Latanoprost (Xalatan 0.005% Ophth Soln)  0 ml EYEBOTH BEDTIME [facility]


   Last Admin: 10/31/19 21:05 Dose:  1 drop





Discontinued Medications





Insulin Aspart (Novolog)  5 unit SUBCUT ONETIME ONE


   Stop: 10/31/19 18:08


   Last Admin: 10/31/19 18:31 Dose:  5 unit


Insulin Aspart (Novolog)  5 unit SUBCUT ONETIME ONE


   Stop: 10/31/19 22:28


   Last Admin: 10/31/19 22:40 Dose:  5 units











- Exam


Quality Assessment: Denies: Supplemental Oxygen


General: Reports: Alert, Oriented, Cooperative, No Acute Distress


Neck: Reports: No JVD


Lungs: Reports: Clear to Auscultation, Normal Respiratory Effort


Cardiovascular: Reports: Regular Rate, Regular Rhythm


GI/Abdominal Exam: No Distention


 (Female) Exam: Deferred


Rectal (Female) Exam: Deferred


Back Exam: Denies: CVA Tenderness (L), CVA Tenderness (R)


Neurological: Reports: No New Focal Deficit


Psy/Mental Status: Reports: Alert, Normal Affect.  Denies: Anxious